# Patient Record
Sex: MALE | Race: BLACK OR AFRICAN AMERICAN | Employment: UNEMPLOYED | URBAN - METROPOLITAN AREA
[De-identification: names, ages, dates, MRNs, and addresses within clinical notes are randomized per-mention and may not be internally consistent; named-entity substitution may affect disease eponyms.]

---

## 2019-01-16 ENCOUNTER — TELEPHONE (OUTPATIENT)
Dept: RHEUMATOLOGY | Age: 29
End: 2019-01-16

## 2019-01-16 ENCOUNTER — OFFICE VISIT (OUTPATIENT)
Dept: RHEUMATOLOGY | Age: 29
End: 2019-01-16
Payer: MEDICAID

## 2019-01-16 VITALS
HEART RATE: 80 BPM | HEIGHT: 63 IN | SYSTOLIC BLOOD PRESSURE: 122 MMHG | WEIGHT: 124.6 LBS | BODY MASS INDEX: 22.08 KG/M2 | DIASTOLIC BLOOD PRESSURE: 80 MMHG

## 2019-01-16 DIAGNOSIS — M45.8 ANKYLOSING SPONDYLITIS OF SACRAL REGION (HCC): Primary | ICD-10-CM

## 2019-01-16 DIAGNOSIS — Z79.899 HIGH RISK MEDICATION USE: ICD-10-CM

## 2019-01-16 DIAGNOSIS — L93.0 DISCOID LUPUS: Chronic | ICD-10-CM

## 2019-01-16 DIAGNOSIS — K50.90 CROHN'S DISEASE WITHOUT COMPLICATION, UNSPECIFIED GASTROINTESTINAL TRACT LOCATION (HCC): Chronic | ICD-10-CM

## 2019-01-16 DIAGNOSIS — M45.8 ANKYLOSING SPONDYLITIS OF SACRAL REGION (HCC): ICD-10-CM

## 2019-01-16 LAB
A/G RATIO: 1 (ref 1.1–2.2)
ALBUMIN SERPL-MCNC: 3.6 G/DL (ref 3.4–5)
ALP BLD-CCNC: 87 U/L (ref 40–129)
ALT SERPL-CCNC: <5 U/L (ref 10–40)
ANION GAP SERPL CALCULATED.3IONS-SCNC: 11 MMOL/L (ref 3–16)
AST SERPL-CCNC: 9 U/L (ref 15–37)
BASOPHILS ABSOLUTE: 0 K/UL (ref 0–0.2)
BASOPHILS RELATIVE PERCENT: 0.6 %
BILIRUB SERPL-MCNC: <0.2 MG/DL (ref 0–1)
BILIRUBIN URINE: NEGATIVE
BLOOD, URINE: NEGATIVE
BUN BLDV-MCNC: 9 MG/DL (ref 7–20)
C-REACTIVE PROTEIN: 82.4 MG/L (ref 0–5.1)
CALCIUM SERPL-MCNC: 9 MG/DL (ref 8.3–10.6)
CHLORIDE BLD-SCNC: 103 MMOL/L (ref 99–110)
CLARITY: ABNORMAL
CO2: 27 MMOL/L (ref 21–32)
COLOR: YELLOW
CREAT SERPL-MCNC: 0.7 MG/DL (ref 0.9–1.3)
EOSINOPHILS ABSOLUTE: 0.1 K/UL (ref 0–0.6)
EOSINOPHILS RELATIVE PERCENT: 2.6 %
EPITHELIAL CELLS, UA: 0 /HPF (ref 0–5)
GFR AFRICAN AMERICAN: >60
GFR NON-AFRICAN AMERICAN: >60
GLOBULIN: 3.7 G/DL
GLUCOSE BLD-MCNC: 88 MG/DL (ref 70–99)
GLUCOSE URINE: NEGATIVE MG/DL
HCT VFR BLD CALC: 33.5 % (ref 40.5–52.5)
HEMOGLOBIN: 10.3 G/DL (ref 13.5–17.5)
HEPATITIS B SURFACE ANTIGEN INTERPRETATION: NORMAL
HEPATITIS C ANTIBODY INTERPRETATION: NORMAL
HYALINE CASTS: 0 /LPF (ref 0–8)
KETONES, URINE: NEGATIVE MG/DL
LEUKOCYTE ESTERASE, URINE: NEGATIVE
LYMPHOCYTES ABSOLUTE: 1.7 K/UL (ref 1–5.1)
LYMPHOCYTES RELATIVE PERCENT: 30.7 %
MCH RBC QN AUTO: 22.6 PG (ref 26–34)
MCHC RBC AUTO-ENTMCNC: 30.7 G/DL (ref 31–36)
MCV RBC AUTO: 73.5 FL (ref 80–100)
MICROSCOPIC EXAMINATION: YES
MONOCYTES ABSOLUTE: 0.4 K/UL (ref 0–1.3)
MONOCYTES RELATIVE PERCENT: 6.5 %
NEUTROPHILS ABSOLUTE: 3.4 K/UL (ref 1.7–7.7)
NEUTROPHILS RELATIVE PERCENT: 59.6 %
NITRITE, URINE: NEGATIVE
PDW BLD-RTO: 19 % (ref 12.4–15.4)
PH UA: 6.5
PLATELET # BLD: 527 K/UL (ref 135–450)
PMV BLD AUTO: 6.9 FL (ref 5–10.5)
POTASSIUM SERPL-SCNC: 4.3 MMOL/L (ref 3.5–5.1)
PROTEIN UA: NEGATIVE MG/DL
RBC # BLD: 4.56 M/UL (ref 4.2–5.9)
RBC UA: 4 /HPF (ref 0–4)
RHEUMATOID FACTOR: <10 IU/ML
SODIUM BLD-SCNC: 141 MMOL/L (ref 136–145)
SPECIFIC GRAVITY UA: 1.02
TOTAL PROTEIN: 7.3 G/DL (ref 6.4–8.2)
UROBILINOGEN, URINE: 0.2 E.U./DL
VITAMIN D 25-HYDROXY: 8.8 NG/ML
WBC # BLD: 5.6 K/UL (ref 4–11)
WBC UA: 1 /HPF (ref 0–5)

## 2019-01-16 PROCEDURE — G8420 CALC BMI NORM PARAMETERS: HCPCS | Performed by: INTERNAL MEDICINE

## 2019-01-16 PROCEDURE — G8484 FLU IMMUNIZE NO ADMIN: HCPCS | Performed by: INTERNAL MEDICINE

## 2019-01-16 PROCEDURE — G8427 DOCREV CUR MEDS BY ELIG CLIN: HCPCS | Performed by: INTERNAL MEDICINE

## 2019-01-16 PROCEDURE — 99244 OFF/OP CNSLTJ NEW/EST MOD 40: CPT | Performed by: INTERNAL MEDICINE

## 2019-01-16 RX ORDER — AMLODIPINE BESYLATE 10 MG/1
10 TABLET ORAL DAILY
COMMUNITY

## 2019-01-16 RX ORDER — MESALAMINE 0.38 G/1
1.5 CAPSULE, EXTENDED RELEASE ORAL DAILY
COMMUNITY
End: 2022-06-30

## 2019-01-16 RX ORDER — DIAPER,BRIEF,INFANT-TODD,DISP
EACH MISCELLANEOUS 2 TIMES DAILY
COMMUNITY

## 2019-01-16 RX ORDER — CYCLOBENZAPRINE HCL 10 MG
10 TABLET ORAL 3 TIMES DAILY PRN
COMMUNITY
End: 2019-11-20 | Stop reason: CLARIF

## 2019-01-16 RX ORDER — METOPROLOL TARTRATE 50 MG/1
50 TABLET, FILM COATED ORAL 2 TIMES DAILY
COMMUNITY
End: 2022-06-30

## 2019-01-16 RX ORDER — FERROUS SULFATE 325(65) MG
325 TABLET ORAL
COMMUNITY

## 2019-01-17 LAB
ANTI-SS-A IGG: 0.7 AI (ref 0–0.9)
ANTI-SS-B IGG: <0.2 AI (ref 0–0.9)
HEPATITIS B CORE TOTAL ANTIBODY: NEGATIVE
HIV AG/AB: NORMAL
HIV ANTIGEN: NORMAL
HIV-1 ANTIBODY: NORMAL
HIV-2 AB: NORMAL
SEDIMENTATION RATE, ERYTHROCYTE: 80 MM/HR (ref 0–15)

## 2019-01-18 ENCOUNTER — TELEPHONE (OUTPATIENT)
Dept: RHEUMATOLOGY | Age: 29
End: 2019-01-18

## 2019-01-18 LAB
CCP IGG ANTIBODIES: 4 UNITS (ref 0–19)
HLA B27: POSITIVE

## 2019-01-19 LAB
QUANTI TB GOLD PLUS: NEGATIVE
QUANTI TB1 MINUS NIL: 0.01 IU/ML (ref 0–0.34)
QUANTI TB2 MINUS NIL: 0.01 IU/ML (ref 0–0.34)
QUANTIFERON MITOGEN: 6.94 IU/ML
QUANTIFERON NIL: 0.01 IU/ML

## 2019-01-23 ENCOUNTER — TELEPHONE (OUTPATIENT)
Dept: RHEUMATOLOGY | Age: 29
End: 2019-01-23

## 2019-01-30 ENCOUNTER — TELEPHONE (OUTPATIENT)
Dept: RHEUMATOLOGY | Age: 29
End: 2019-01-30

## 2019-02-20 ENCOUNTER — HOSPITAL ENCOUNTER (OUTPATIENT)
Dept: GENERAL RADIOLOGY | Age: 29
Discharge: HOME | End: 2019-02-20
Payer: MEDICAID

## 2019-02-20 ENCOUNTER — HOSPITAL ENCOUNTER (OUTPATIENT)
Age: 29
Discharge: HOME | End: 2019-02-20
Payer: MEDICAID

## 2019-02-20 ENCOUNTER — OFFICE VISIT (OUTPATIENT)
Dept: RHEUMATOLOGY | Age: 29
End: 2019-02-20
Payer: MEDICAID

## 2019-02-20 VITALS
WEIGHT: 120 LBS | BODY MASS INDEX: 20.49 KG/M2 | SYSTOLIC BLOOD PRESSURE: 120 MMHG | HEART RATE: 66 BPM | HEIGHT: 64 IN | DIASTOLIC BLOOD PRESSURE: 84 MMHG

## 2019-02-20 DIAGNOSIS — Z23 NEED FOR INFLUENZA VACCINATION: ICD-10-CM

## 2019-02-20 DIAGNOSIS — R70.0 ELEVATED ERYTHROCYTE SEDIMENTATION RATE: ICD-10-CM

## 2019-02-20 DIAGNOSIS — K50.90 CROHN'S DISEASE WITHOUT COMPLICATION, UNSPECIFIED GASTROINTESTINAL TRACT LOCATION (HCC): Primary | Chronic | ICD-10-CM

## 2019-02-20 DIAGNOSIS — M45.8 ANKYLOSING SPONDYLITIS OF SACRAL REGION (HCC): ICD-10-CM

## 2019-02-20 DIAGNOSIS — R79.82 ELEVATED C-REACTIVE PROTEIN (CRP): ICD-10-CM

## 2019-02-20 DIAGNOSIS — K50.90 CROHN'S DISEASE WITHOUT COMPLICATION, UNSPECIFIED GASTROINTESTINAL TRACT LOCATION (HCC): Chronic | ICD-10-CM

## 2019-02-20 DIAGNOSIS — Z79.899 HIGH RISK MEDICATION USE: ICD-10-CM

## 2019-02-20 DIAGNOSIS — E55.9 VITAMIN D DEFICIENCY: Chronic | ICD-10-CM

## 2019-02-20 DIAGNOSIS — M45.0 ANKYLOSING SPONDYLITIS OF MULTIPLE SITES IN SPINE (HCC): Chronic | ICD-10-CM

## 2019-02-20 DIAGNOSIS — L93.0 DISCOID LUPUS: Chronic | ICD-10-CM

## 2019-02-20 LAB
C3 COMPLEMENT: 150.2 MG/DL (ref 90–180)
C4 COMPLEMENT: 33.4 MG/DL (ref 10–40)

## 2019-02-20 PROCEDURE — 90471 IMMUNIZATION ADMIN: CPT | Performed by: INTERNAL MEDICINE

## 2019-02-20 PROCEDURE — 99215 OFFICE O/P EST HI 40 MIN: CPT | Performed by: INTERNAL MEDICINE

## 2019-02-20 PROCEDURE — 4004F PT TOBACCO SCREEN RCVD TLK: CPT | Performed by: INTERNAL MEDICINE

## 2019-02-20 PROCEDURE — 73130 X-RAY EXAM OF HAND: CPT

## 2019-02-20 PROCEDURE — G8420 CALC BMI NORM PARAMETERS: HCPCS | Performed by: INTERNAL MEDICINE

## 2019-02-20 PROCEDURE — G8427 DOCREV CUR MEDS BY ELIG CLIN: HCPCS | Performed by: INTERNAL MEDICINE

## 2019-02-20 PROCEDURE — 72050 X-RAY EXAM NECK SPINE 4/5VWS: CPT

## 2019-02-20 PROCEDURE — 90686 IIV4 VACC NO PRSV 0.5 ML IM: CPT | Performed by: INTERNAL MEDICINE

## 2019-02-20 PROCEDURE — G8482 FLU IMMUNIZE ORDER/ADMIN: HCPCS | Performed by: INTERNAL MEDICINE

## 2019-02-21 LAB — ANTI-NUCLEAR ANTIBODY (ANA): NEGATIVE

## 2019-02-26 DIAGNOSIS — M53.2X2 SPINAL INSTABILITY OF CERVICAL REGION: Primary | ICD-10-CM

## 2019-03-06 ENCOUNTER — HOSPITAL ENCOUNTER (OUTPATIENT)
Dept: MRI IMAGING | Age: 29
Discharge: HOME | End: 2019-03-06
Payer: MEDICAID

## 2019-03-06 DIAGNOSIS — M53.2X2 SPINAL INSTABILITY OF CERVICAL REGION: ICD-10-CM

## 2019-03-06 PROCEDURE — 72141 MRI NECK SPINE W/O DYE: CPT

## 2019-03-08 ENCOUNTER — TELEPHONE (OUTPATIENT)
Dept: RHEUMATOLOGY | Age: 29
End: 2019-03-08

## 2019-03-11 ENCOUNTER — TELEPHONE (OUTPATIENT)
Dept: INTERNAL MEDICINE CLINIC | Age: 29
End: 2019-03-11

## 2019-03-12 ENCOUNTER — TELEPHONE (OUTPATIENT)
Dept: INTERNAL MEDICINE CLINIC | Age: 29
End: 2019-03-12

## 2019-03-19 ENCOUNTER — TELEPHONE (OUTPATIENT)
Dept: INTERNAL MEDICINE CLINIC | Age: 29
End: 2019-03-19

## 2019-05-07 NOTE — PROGRESS NOTES
@[facility]@     Nisreen Mock MD  [facility] ([facility]) Physicians  [facility] [place] and Rheumatology    Rheumatology Progress Note  SUBJECTIVE:    Background:   Azalia Garland is a 26 y.o. AAM w/ recently diagnosed AS (+HLA B27, chronic inflammatory back pain, MRI evidence of b/l sacroiliitis w/ peripheral involvement, Hx of plantar fasciitis) in setting of Bx proven CD (follows w/ GI, Dr. Felix Tate) and Bx proven discoid lupus (follows w/ Dermatologist, Dr. Benji Casiano). PMHx pertinent for severe vitamin D deficiency, HTN, PTSD, and anxiety. Current rheum meds:  Humira 40 mg SC Q2wk: cleared by Derm and GI, received first dose on 3/15/19  Apriso (GI)  Tramadol (per PM)  weekly Cholecalciferol 50,000 units w/ daily calcium citrate 1000 mg  Fluocinonide topical (per Dermatology)     Past medical/surgical history, medications and allergies are reviewed and updated as appropriate. Interval Hx:   Reviewed Dermatology note from 2/27/19 who agreed w/ initiation of TNF-I. Pt received his first dose of Humira on 3/15/19, he's been injecting himself. He feels up to 40% improvement in his chronic LBP. Prior morning stiffness has completely resolved. He states that he is able to participate in more daily ADLs. He is going to PT, feels he is getting back some ROM in his spine. He is very curious about his MRI findings. Pt states he has been evaluated by Neurosurgery (Dr. Workman)'s ? NP and was told that he has a stable C-spine, no surgery needed. Pt established care w/ [facility], reviewed progress note from 2/21/19, pt was started on Tramadol. He wants to switch to another pain doctor because it's too far away - he states he has to drive an hr each way. He denies any active IBD Sx, he states he remains on Apriso and he has an upcoming appt w/ Dr. Gera Lutz later this month. No new discoid lesions, has upcoming appt w/ dermatologist next wk.   Denies recent oral or nasal ulcers. Denies enthesitis, Sx of ocular inflammation. Rheumatologic ROS:  Constitutional: +fatigue improved, denies fever/chills, night sweats, unintentional weight loss  Integumentary: +photosensitivity w/ chronic discoid lupus rash and chronic diffuse alopecia, denies Sx of Raynaud's phenomenon  Eyes: blurry vision in L eye after watching TV for a long time, denies dry eyes, redness or pain  Ears: denies hearing loss, tinnitus, vertigo, or recurrent ear infections  Nose: denies nasal ulcers or recurrent sinusitis  Oral cavity: +occasional tongue ulcers, denies dry mouth or thrush  Cardiovascular: denies CP, palpitations, Hx of pericardial effusion or pericarditis  Respiratory: denies SOB, cough, hemoptysis, or pleurisy  Gastrointestinal: denies heart burn, dysphagia or esophageal dysmotility, denies change in bowel habits or Sx of IBD  Genitourinary: denies change in urine amount or urine appearance, denies frothy urine or Hx of nephrolithiasis  Hematologic/Lymphatic: denies abnormal bruising or bleeding, denies Hx of blood clots or recurrent miscarriages, denies swollen lymph nodes  Musculoskeletal:  refer to above HPI   Neurological: denies focal weakness, paresthesias/hyperesthesias or change in sensation, denies Hx of seizure, denies change in gait, balance, or memory  Psychiatric: +PTSD, anxiety on Buspirone and Escitalopram  Endocrine: denies Hx of thyroid or parathyroid disease, denies known Hx of osteoporosis or fragility fractures  Allergic/Immunologic: denies nasal congestion, chronic asthma, or hives    No Known Allergies    Past Medical History:    No past medical history on file. Past Surgical History:    No past surgical history on file.     Medications:    Current Outpatient Medications   Medication Sig Dispense Refill    adalimumab (HUMIRA) 40 MG/0.8ML injection Inject 40 mg into the skin once      CALCIUM CITRATE, BARIATRIC ADVANTAGE, 500MG LOZENGE Take 1 lozenge by mouth 2 times strong full fists              Wrist: no synovitis in the wrist joints b/l, FROM              Elbow: no synovitis or bursitis, FROM              Shoulders: no pain or swelling or warmth on palpation, FROM  Lower extremities:              Knees: no warmth or effusion present, FROM              Ankles: no synovitis, FROM              Feet: no toe swelling or pain or warmth on palpation w/ FROM, negative MTP squeeze test  INTEGUMENTARY: hypopigmented scars and macules on face, inner ear, and arms, prior dry and scaly skin resolved, there is diffuse hair thinning, multiple tattoos, no petechiae, bruises, or palpable purpura, no clubbing or digital ulcers  PSYCH: normal mood    DATA:  Labs:    I personally reviewed interval labs and discussed w/ the pt in detail which showed:  Lab Results   Component Value Date    WBC 5.6 01/16/2019    HGB 10.3 (L) 01/16/2019    HCT 33.5 (L) 01/16/2019    MCV 73.5 (L) 01/16/2019     (H) 01/16/2019    LYMPHOPCT 30.7 01/16/2019    RBC 4.56 01/16/2019    MCH 22.6 (L) 01/16/2019    MCHC 30.7 (L) 01/16/2019    RDW 19.0 (H) 01/16/2019     Lab Results   Component Value Date     01/16/2019    K 4.3 01/16/2019     01/16/2019    CO2 27 01/16/2019    BUN 9 01/16/2019    CREATININE 0.7 (L) 01/16/2019    GLUCOSE 88 01/16/2019    CALCIUM 9.0 01/16/2019    PROT 7.3 01/16/2019    LABALBU 3.6 01/16/2019    BILITOT <0.2 01/16/2019    ALKPHOS 87 01/16/2019    AST 9 (L) 01/16/2019    ALT <5 (L) 01/16/2019    LABGLOM >60 01/16/2019    GFRAA >60 01/16/2019    AGRATIO 1.0 (L) 01/16/2019    GLOB 3.7 01/16/2019     CRP elevated to 9 mg/dL, ESR 63 (10/9/18)  NENITA negative (10/9/18)    Interval labs from 1/16/19:  UA w/ micro: no protein or active urinary sediments  CRP significantly elevated to 82.4 mg/L, ESR 80  Positive HLA-B27  Negative SSA, SSB  Negative hepatitis B and C serologies, HIV screen, QuantiFERON TB  Vitamin D, 25-hydroxy 8.8    Interval labs from 2/20/19:  Negative NENITA, C3 and C4 Marc Walter) from 8/17/18:  \"Pt w/ CD, R sided colitis at last c-scope in 2011. He has been noncompliant with meds and has not followed up with us until recently. .. Restarted Apriso 1 month back\"    C-scope from 8/17/18:  \"There was erosion, friability, granularity, linear ulcer, loss of vascular marking noted in the colon. There was stigmata bleeding noted. Cold forceps biopsies were obtained for the purpose of histology\". Random colon Bx showed \"focal active colitis, negative for dysplasia\".     Dermatopathology report on LUE skin Bx (9/28/18) showed \"interface dermatitis with increase in interstitial mucin\"    Above results were discussed w/ the pt in detail during today's visit. ASSESSMENT/PLAN:   I personally reviewed Dermatology note from 2/27/19 and Pain Management note from 2/27/19 and discussed w/ the pt and his mother. Significant improvement in AS since starting Humira, he has already regained some of his axial ROM back. Repeat inflammatory markers today. Cont current dose at 40 mg SQ Q14 days, he's been tolerating this w/o s/e. Again we discussed the s/e and risks of TNF-I including DIL in setting of his discoid lupus. He remains asymptomatic, will plan on repeating lupus markers at f/u visit. Pt states he still needs to contact his PCP regarding Prevnar 13 and Pneumonax. I encouraged the pt to cont going to PT. Will reach out to Neurosurgeon's office for recent notes. Per pt request, made referral to External Pain Management as his current pain physician is too far away. Avoid NSAIDs d/t Hx of IBD. Previously called in weekly Cholecalciferol 50,000 units w/ daily calcium citrate 1000 mg but pt states he only took 4 doses of Cholecalciferol and has not started taking Calcium. I instructed him to call his pharmacy for more refills. Ruby Salazar was seen today for follow-up.     Diagnoses and all orders for this visit:    Ankylosing spondylitis of multiple sites in spine (HCC)  -     C-Reactive Protein; Future  -     Sedimentation Rate; Future  -     External Referral To Pain Clinic    Crohn's disease without complication, unspecified gastrointestinal tract location (HCC)  -     C-Reactive Protein; Future  -     Sedimentation Rate; Future  -     CALCIUM CITRATE, BARIATRIC ADVANTAGE, 500MG LOZENGE; Take 1 lozenge by mouth 2 times daily    Vitamin D deficiency  -     CALCIUM CITRATE, BARIATRIC ADVANTAGE, 500MG LOZENGE; Take 1 lozenge by mouth 2 times daily    Discoid lupus    Elevated C-reactive protein (CRP)    Elevated erythrocyte sedimentation rate    High risk medication use        Orders Placed This Encounter   Procedures    C-Reactive Protein     Standing Status:   Future     Number of Occurrences:   1     Standing Expiration Date:   11/8/2019    Sedimentation Rate     Standing Status:   Future     Number of Occurrences:   1     Standing Expiration Date:   11/8/2019    External Referral To Pain Clinic     Referral Priority:   Urgent     Referral Type:   Eval and Treat     Referral Reason:   Specialty Services Required     Requested Specialty:   Pain Management     Number of Visits Requested:   1     Outpatient Encounter Medications as of 5/8/2019   Medication Sig Dispense Refill    adalimumab (HUMIRA) 40 MG/0.8ML injection Inject 40 mg into the skin once      CALCIUM CITRATE, BARIATRIC ADVANTAGE, 500MG LOZENGE Take 1 lozenge by mouth 2 times daily 270 lozenge 0    Cholecalciferol 93010 units TABS Take 50,000 Units by mouth once a week For 12 weeks then take over-the-counter vitamin D3 2000 units daily. 12 tablet 0    fluocinolone 0.01 % cream Apply topically 2 times daily      hydrocortisone 0.5 % cream Apply topically 2 times daily Apply topically 2 times daily.       amLODIPine (NORVASC) 10 MG tablet Take 10 mg by mouth daily      metoprolol tartrate (LOPRESSOR) 50 MG tablet Take 50 mg by mouth 2 times daily      ferrous sulfate 325 (65 Fe) MG tablet Take 325 mg by mouth daily (with breakfast)      mesalamine (APRISO) 0.375 g extended release capsule Take 1.5 g by mouth daily      cyclobenzaprine (FLEXERIL) 10 MG tablet Take 10 mg by mouth 3 times daily as needed for Muscle spasms      [DISCONTINUED] CALCIUM CITRATE, BARIATRIC ADVANTAGE, 500MG LOZENGE Take 1 lozenge by mouth 2 times daily 270 lozenge 0     No facility-administered encounter medications on file as of 5/8/2019. Return in about 3 months (around 8/8/2019) for lab result discussion and treatment plan, medication monitoring. The risks and benefits of my recommendations, as well as other treatment options, benefits and side effects were discussed with the patient today. Questions were answered. Tayo Riley MD  [facility]) Physicians  [facility] [place] and Rheumatology  [address] [place] [address] [address]  [facility]:[phone]  [facility]:[phone]    NOTE: This report is transcribed by using voice recognition software dragon. Every effort is made to ensure accuracy; however, inadvertent computerized  transcription errors may be present.      Yaya Gonzalez MD, 5/8/2019 4:30 PM

## 2019-05-08 ENCOUNTER — OFFICE VISIT (OUTPATIENT)
Dept: RHEUMATOLOGY | Age: 29
End: 2019-05-08
Payer: MEDICAID

## 2019-05-08 VITALS
SYSTOLIC BLOOD PRESSURE: 129 MMHG | DIASTOLIC BLOOD PRESSURE: 88 MMHG | WEIGHT: 123 LBS | BODY MASS INDEX: 21.11 KG/M2 | HEART RATE: 87 BPM

## 2019-05-08 DIAGNOSIS — M45.0 ANKYLOSING SPONDYLITIS OF MULTIPLE SITES IN SPINE (HCC): Primary | ICD-10-CM

## 2019-05-08 DIAGNOSIS — M45.0 ANKYLOSING SPONDYLITIS OF MULTIPLE SITES IN SPINE (HCC): ICD-10-CM

## 2019-05-08 DIAGNOSIS — R79.82 ELEVATED C-REACTIVE PROTEIN (CRP): ICD-10-CM

## 2019-05-08 DIAGNOSIS — K50.90 CROHN'S DISEASE WITHOUT COMPLICATION, UNSPECIFIED GASTROINTESTINAL TRACT LOCATION (HCC): ICD-10-CM

## 2019-05-08 DIAGNOSIS — Z79.899 HIGH RISK MEDICATION USE: ICD-10-CM

## 2019-05-08 DIAGNOSIS — R70.0 ELEVATED ERYTHROCYTE SEDIMENTATION RATE: ICD-10-CM

## 2019-05-08 DIAGNOSIS — L93.0 DISCOID LUPUS: ICD-10-CM

## 2019-05-08 DIAGNOSIS — E55.9 VITAMIN D DEFICIENCY: ICD-10-CM

## 2019-05-08 PROCEDURE — 4004F PT TOBACCO SCREEN RCVD TLK: CPT | Performed by: INTERNAL MEDICINE

## 2019-05-08 PROCEDURE — G8427 DOCREV CUR MEDS BY ELIG CLIN: HCPCS | Performed by: INTERNAL MEDICINE

## 2019-05-08 PROCEDURE — 99215 OFFICE O/P EST HI 40 MIN: CPT | Performed by: INTERNAL MEDICINE

## 2019-05-08 PROCEDURE — G8420 CALC BMI NORM PARAMETERS: HCPCS | Performed by: INTERNAL MEDICINE

## 2019-05-09 ENCOUNTER — TELEPHONE (OUTPATIENT)
Dept: INTERNAL MEDICINE CLINIC | Age: 29
End: 2019-05-09

## 2019-05-09 DIAGNOSIS — K50.90 CROHN'S DISEASE WITHOUT COMPLICATION, UNSPECIFIED GASTROINTESTINAL TRACT LOCATION (HCC): Chronic | ICD-10-CM

## 2019-05-09 DIAGNOSIS — M45.0 ANKYLOSING SPONDYLITIS OF MULTIPLE SITES IN SPINE (HCC): Primary | Chronic | ICD-10-CM

## 2019-05-09 LAB
C-REACTIVE PROTEIN: 17.2 MG/L (ref 0–5.1)
SEDIMENTATION RATE, ERYTHROCYTE: 20 MM/HR (ref 0–15)

## 2019-05-09 NOTE — TELEPHONE ENCOUNTER
Patient calling with info requested by Dr. Constanza Correia. Pharmacy fax [phone]  [address] [place] [address].

## 2019-05-13 ENCOUNTER — TELEPHONE (OUTPATIENT)
Dept: RHEUMATOLOGY | Age: 29
End: 2019-05-13

## 2019-05-13 NOTE — TELEPHONE ENCOUNTER
Received fax stating needing clarification on Humira, spoke with Hu named Sanam Hansen stating that it is Humira CF 40mg/0.4ML Pen. Pharmacy verbalized understanding.

## 2019-05-17 ENCOUNTER — TELEPHONE (OUTPATIENT)
Dept: RHEUMATOLOGY | Age: 29
End: 2019-05-17

## 2019-05-17 NOTE — TELEPHONE ENCOUNTER
Received fax requesting last office note, labs and [facility] Faxed with successful.

## 2019-06-28 ENCOUNTER — TELEPHONE (OUTPATIENT)
Dept: INTERNAL MEDICINE CLINIC | Age: 29
End: 2019-06-28

## 2019-07-25 ENCOUNTER — TELEPHONE (OUTPATIENT)
Dept: INTERNAL MEDICINE CLINIC | Age: 29
End: 2019-07-25

## 2019-07-26 ENCOUNTER — TELEPHONE (OUTPATIENT)
Dept: INTERNAL MEDICINE CLINIC | Age: 29
End: 2019-07-26

## 2019-07-26 DIAGNOSIS — M45.0 ANKYLOSING SPONDYLITIS OF MULTIPLE SITES IN SPINE (HCC): Primary | ICD-10-CM

## 2019-07-30 ENCOUNTER — TELEPHONE (OUTPATIENT)
Dept: INTERNAL MEDICINE CLINIC | Age: 29
End: 2019-07-30

## 2019-07-31 ENCOUNTER — TELEPHONE (OUTPATIENT)
Dept: INTERNAL MEDICINE CLINIC | Age: 29
End: 2019-07-31

## 2019-07-31 NOTE — TELEPHONE ENCOUNTER
Pt calling back to give you a fax# to [facility] to send order for mri  [phone]

## 2019-08-05 DIAGNOSIS — M45.0 ANKYLOSING SPONDYLITIS OF MULTIPLE SITES IN SPINE (HCC): Primary | ICD-10-CM

## 2019-08-05 RX ORDER — ADALIMUMAB 40MG/0.4ML
KIT SUBCUTANEOUS
Qty: 2 EACH | Refills: 2 | Status: SHIPPED | OUTPATIENT
Start: 2019-08-05 | End: 2019-10-30 | Stop reason: SDUPTHER

## 2019-08-21 ENCOUNTER — OFFICE VISIT (OUTPATIENT)
Dept: RHEUMATOLOGY | Age: 29
End: 2019-08-21
Payer: MEDICAID

## 2019-08-21 VITALS
DIASTOLIC BLOOD PRESSURE: 99 MMHG | BODY MASS INDEX: 22.31 KG/M2 | HEART RATE: 68 BPM | WEIGHT: 130 LBS | SYSTOLIC BLOOD PRESSURE: 142 MMHG

## 2019-08-21 DIAGNOSIS — L93.0 DISCOID LUPUS: ICD-10-CM

## 2019-08-21 DIAGNOSIS — E55.9 VITAMIN D DEFICIENCY: ICD-10-CM

## 2019-08-21 DIAGNOSIS — K50.90 CROHN'S DISEASE WITHOUT COMPLICATION, UNSPECIFIED GASTROINTESTINAL TRACT LOCATION (HCC): ICD-10-CM

## 2019-08-21 DIAGNOSIS — R79.82 ELEVATED C-REACTIVE PROTEIN (CRP): ICD-10-CM

## 2019-08-21 DIAGNOSIS — Z79.899 HIGH RISK MEDICATION USE: ICD-10-CM

## 2019-08-21 DIAGNOSIS — M45.0 ANKYLOSING SPONDYLITIS OF MULTIPLE SITES IN SPINE (HCC): Primary | ICD-10-CM

## 2019-08-21 DIAGNOSIS — M45.0 ANKYLOSING SPONDYLITIS OF MULTIPLE SITES IN SPINE (HCC): ICD-10-CM

## 2019-08-21 LAB
BASOPHILS ABSOLUTE: 0 K/UL (ref 0–0.2)
BASOPHILS RELATIVE PERCENT: 0.7 %
EOSINOPHILS ABSOLUTE: 0.1 K/UL (ref 0–0.6)
EOSINOPHILS RELATIVE PERCENT: 1.5 %
HCT VFR BLD CALC: 41.3 % (ref 40.5–52.5)
HEMOGLOBIN: 13.7 G/DL (ref 13.5–17.5)
LYMPHOCYTES ABSOLUTE: 3 K/UL (ref 1–5.1)
LYMPHOCYTES RELATIVE PERCENT: 46.3 %
MCH RBC QN AUTO: 28.8 PG (ref 26–34)
MCHC RBC AUTO-ENTMCNC: 33.1 G/DL (ref 31–36)
MCV RBC AUTO: 86.9 FL (ref 80–100)
MONOCYTES ABSOLUTE: 0.3 K/UL (ref 0–1.3)
MONOCYTES RELATIVE PERCENT: 5.2 %
NEUTROPHILS ABSOLUTE: 3 K/UL (ref 1.7–7.7)
NEUTROPHILS RELATIVE PERCENT: 46.3 %
PDW BLD-RTO: 17.1 % (ref 12.4–15.4)
PLATELET # BLD: 303 K/UL (ref 135–450)
PMV BLD AUTO: 8.7 FL (ref 5–10.5)
RBC # BLD: 4.75 M/UL (ref 4.2–5.9)
WBC # BLD: 6.4 K/UL (ref 4–11)

## 2019-08-21 PROCEDURE — G8427 DOCREV CUR MEDS BY ELIG CLIN: HCPCS | Performed by: INTERNAL MEDICINE

## 2019-08-21 PROCEDURE — 99214 OFFICE O/P EST MOD 30 MIN: CPT | Performed by: INTERNAL MEDICINE

## 2019-08-21 PROCEDURE — 4004F PT TOBACCO SCREEN RCVD TLK: CPT | Performed by: INTERNAL MEDICINE

## 2019-08-21 PROCEDURE — G8420 CALC BMI NORM PARAMETERS: HCPCS | Performed by: INTERNAL MEDICINE

## 2019-08-22 DIAGNOSIS — L93.0 DISCOID LUPUS: ICD-10-CM

## 2019-08-22 LAB
A/G RATIO: 1.4 (ref 1.1–2.2)
ALBUMIN SERPL-MCNC: 4.3 G/DL (ref 3.4–5)
ALP BLD-CCNC: 87 U/L (ref 40–129)
ALT SERPL-CCNC: <5 U/L (ref 10–40)
ANION GAP SERPL CALCULATED.3IONS-SCNC: 16 MMOL/L (ref 3–16)
ANTI-DSDNA IGG: 1 IU/ML (ref 0–9)
AST SERPL-CCNC: 15 U/L (ref 15–37)
BILIRUB SERPL-MCNC: <0.2 MG/DL (ref 0–1)
BUN BLDV-MCNC: 8 MG/DL (ref 7–20)
C-REACTIVE PROTEIN: 15.8 MG/L (ref 0–5.1)
C3 COMPLEMENT: 138.4 MG/DL (ref 90–180)
C4 COMPLEMENT: 26.9 MG/DL (ref 10–40)
CALCIUM SERPL-MCNC: 9.1 MG/DL (ref 8.3–10.6)
CHLORIDE BLD-SCNC: 100 MMOL/L (ref 99–110)
CO2: 24 MMOL/L (ref 21–32)
CREAT SERPL-MCNC: 1 MG/DL (ref 0.9–1.3)
GFR AFRICAN AMERICAN: >60
GFR NON-AFRICAN AMERICAN: >60
GLOBULIN: 3 G/DL
GLUCOSE BLD-MCNC: 87 MG/DL (ref 70–99)
POTASSIUM SERPL-SCNC: 4.4 MMOL/L (ref 3.5–5.1)
SEDIMENTATION RATE, ERYTHROCYTE: 18 MM/HR (ref 0–15)
SODIUM BLD-SCNC: 140 MMOL/L (ref 136–145)
TOTAL PROTEIN: 7.3 G/DL (ref 6.4–8.2)
VITAMIN D 25-HYDROXY: 41.9 NG/ML

## 2019-09-27 ENCOUNTER — TELEPHONE (OUTPATIENT)
Dept: INTERNAL MEDICINE CLINIC | Age: 29
End: 2019-09-27

## 2019-10-09 ENCOUNTER — TELEPHONE (OUTPATIENT)
Dept: INTERNAL MEDICINE CLINIC | Age: 29
End: 2019-10-09

## 2019-10-30 DIAGNOSIS — M45.0 ANKYLOSING SPONDYLITIS OF MULTIPLE SITES IN SPINE (HCC): Primary | ICD-10-CM

## 2019-10-30 RX ORDER — ADALIMUMAB 40MG/0.4ML
KIT SUBCUTANEOUS
Qty: 2 EACH | Refills: 2 | Status: SHIPPED | OUTPATIENT
Start: 2019-10-30 | End: 2020-04-08

## 2019-11-20 ENCOUNTER — OFFICE VISIT (OUTPATIENT)
Dept: RHEUMATOLOGY | Age: 29
End: 2019-11-20
Payer: MEDICAID

## 2019-11-20 VITALS
DIASTOLIC BLOOD PRESSURE: 76 MMHG | WEIGHT: 133 LBS | BODY MASS INDEX: 22.83 KG/M2 | SYSTOLIC BLOOD PRESSURE: 114 MMHG | HEART RATE: 68 BPM

## 2019-11-20 DIAGNOSIS — M45.0 ANKYLOSING SPONDYLITIS OF MULTIPLE SITES IN SPINE (HCC): Primary | ICD-10-CM

## 2019-11-20 DIAGNOSIS — Z79.899 HIGH RISK MEDICATION USE: ICD-10-CM

## 2019-11-20 DIAGNOSIS — L93.0 DISCOID LUPUS: ICD-10-CM

## 2019-11-20 DIAGNOSIS — K50.90 CROHN'S DISEASE WITHOUT COMPLICATION, UNSPECIFIED GASTROINTESTINAL TRACT LOCATION (HCC): ICD-10-CM

## 2019-11-20 PROCEDURE — G8420 CALC BMI NORM PARAMETERS: HCPCS | Performed by: INTERNAL MEDICINE

## 2019-11-20 PROCEDURE — G8484 FLU IMMUNIZE NO ADMIN: HCPCS | Performed by: INTERNAL MEDICINE

## 2019-11-20 PROCEDURE — 4004F PT TOBACCO SCREEN RCVD TLK: CPT | Performed by: INTERNAL MEDICINE

## 2019-11-20 PROCEDURE — G8427 DOCREV CUR MEDS BY ELIG CLIN: HCPCS | Performed by: INTERNAL MEDICINE

## 2019-11-20 PROCEDURE — 99215 OFFICE O/P EST HI 40 MIN: CPT | Performed by: INTERNAL MEDICINE

## 2019-11-20 RX ORDER — TRAMADOL HYDROCHLORIDE 50 MG/1
TABLET ORAL
COMMUNITY
End: 2020-10-01 | Stop reason: ALTCHOICE

## 2019-11-26 ENCOUNTER — TELEPHONE (OUTPATIENT)
Dept: RHEUMATOLOGY | Age: 29
End: 2019-11-26

## 2019-12-03 ENCOUNTER — TELEPHONE (OUTPATIENT)
Dept: RHEUMATOLOGY | Age: 29
End: 2019-12-03

## 2019-12-03 DIAGNOSIS — D72.820 LYMPHOCYTOSIS: Primary | ICD-10-CM

## 2019-12-04 ENCOUNTER — TELEPHONE (OUTPATIENT)
Dept: RHEUMATOLOGY | Age: 29
End: 2019-12-04

## 2019-12-09 ENCOUNTER — TELEPHONE (OUTPATIENT)
Dept: INTERNAL MEDICINE CLINIC | Age: 29
End: 2019-12-09

## 2019-12-09 NOTE — TELEPHONE ENCOUNTER
Went over all the messages about Humira. Explained that Humira has been approved. He wants to know the status regarding Remicaid     Is aware that Dr. Reymundo Grijalva and Rao Jalloh are out of the office. Asking if Priyanka can call tomorrow.

## 2019-12-11 ENCOUNTER — TELEPHONE (OUTPATIENT)
Dept: RHEUMATOLOGY | Age: 29
End: 2019-12-11

## 2019-12-18 ENCOUNTER — TELEPHONE (OUTPATIENT)
Dept: RHEUMATOLOGY | Age: 29
End: 2019-12-18

## 2019-12-23 ENCOUNTER — TELEPHONE (OUTPATIENT)
Dept: RHEUMATOLOGY | Age: 29
End: 2019-12-23

## 2019-12-30 ENCOUNTER — NURSE ONLY (OUTPATIENT)
Dept: RHEUMATOLOGY | Age: 29
End: 2019-12-30
Payer: COMMERCIAL

## 2019-12-30 VITALS
RESPIRATION RATE: 16 BRPM | SYSTOLIC BLOOD PRESSURE: 120 MMHG | BODY MASS INDEX: 22.8 KG/M2 | DIASTOLIC BLOOD PRESSURE: 68 MMHG | TEMPERATURE: 97.7 F | HEART RATE: 70 BPM | WEIGHT: 132.8 LBS

## 2019-12-30 DIAGNOSIS — Z79.899 ENCOUNTER FOR LONG-TERM (CURRENT) USE OF HIGH-RISK MEDICATION: ICD-10-CM

## 2019-12-30 DIAGNOSIS — M45.0 ANKYLOSING SPONDYLITIS OF MULTIPLE SITES IN SPINE (HCC): ICD-10-CM

## 2019-12-30 DIAGNOSIS — Z79.899 ENCOUNTER FOR LONG-TERM (CURRENT) USE OF HIGH-RISK MEDICATION: Primary | ICD-10-CM

## 2019-12-30 LAB
ALBUMIN SERPL-MCNC: 3.9 G/DL (ref 3.4–5)
ALP BLD-CCNC: 79 U/L (ref 40–129)
ALT SERPL-CCNC: 6 U/L (ref 10–40)
AST SERPL-CCNC: 14 U/L (ref 15–37)
BASOPHILS ABSOLUTE: 0.1 K/UL (ref 0–0.2)
BASOPHILS RELATIVE PERCENT: 1 %
BILIRUB SERPL-MCNC: <0.2 MG/DL (ref 0–1)
BILIRUBIN DIRECT: <0.2 MG/DL (ref 0–0.3)
BILIRUBIN, INDIRECT: ABNORMAL MG/DL (ref 0–1)
CREAT SERPL-MCNC: 1 MG/DL (ref 0.9–1.3)
EOSINOPHILS ABSOLUTE: 0 K/UL (ref 0–0.6)
EOSINOPHILS RELATIVE PERCENT: 0.8 %
GFR AFRICAN AMERICAN: >60
GFR NON-AFRICAN AMERICAN: >60
HCT VFR BLD CALC: 39.5 % (ref 40.5–52.5)
HEMOGLOBIN: 13.3 G/DL (ref 13.5–17.5)
LYMPHOCYTES ABSOLUTE: 2.7 K/UL (ref 1–5.1)
LYMPHOCYTES RELATIVE PERCENT: 47.4 %
MCH RBC QN AUTO: 29.7 PG (ref 26–34)
MCHC RBC AUTO-ENTMCNC: 33.7 G/DL (ref 31–36)
MCV RBC AUTO: 88.3 FL (ref 80–100)
MONOCYTES ABSOLUTE: 0.3 K/UL (ref 0–1.3)
MONOCYTES RELATIVE PERCENT: 5.7 %
NEUTROPHILS ABSOLUTE: 2.6 K/UL (ref 1.7–7.7)
NEUTROPHILS RELATIVE PERCENT: 45.1 %
PDW BLD-RTO: 13.8 % (ref 12.4–15.4)
PLATELET # BLD: 212 K/UL (ref 135–450)
PMV BLD AUTO: 8.1 FL (ref 5–10.5)
RBC # BLD: 4.47 M/UL (ref 4.2–5.9)
TOTAL PROTEIN: 6.4 G/DL (ref 6.4–8.2)
WBC # BLD: 5.8 K/UL (ref 4–11)

## 2019-12-30 PROCEDURE — 96415 CHEMO IV INFUSION ADDL HR: CPT | Performed by: INTERNAL MEDICINE

## 2019-12-30 PROCEDURE — 96413 CHEMO IV INFUSION 1 HR: CPT | Performed by: INTERNAL MEDICINE

## 2019-12-30 RX ORDER — INFLIXIMAB 100 MG/10ML
300 INJECTION, POWDER, LYOPHILIZED, FOR SOLUTION INTRAVENOUS ONCE
Status: COMPLETED | OUTPATIENT
Start: 2019-12-30 | End: 2019-12-30

## 2019-12-30 RX ADMIN — INFLIXIMAB 300 MG: 100 INJECTION, POWDER, LYOPHILIZED, FOR SOLUTION INTRAVENOUS at 12:05

## 2020-01-15 ENCOUNTER — NURSE ONLY (OUTPATIENT)
Dept: RHEUMATOLOGY | Age: 30
End: 2020-01-15
Payer: COMMERCIAL

## 2020-01-15 VITALS
RESPIRATION RATE: 16 BRPM | TEMPERATURE: 98.7 F | WEIGHT: 137 LBS | BODY MASS INDEX: 23.52 KG/M2 | DIASTOLIC BLOOD PRESSURE: 74 MMHG | HEART RATE: 70 BPM | SYSTOLIC BLOOD PRESSURE: 126 MMHG

## 2020-01-15 PROCEDURE — 96413 CHEMO IV INFUSION 1 HR: CPT | Performed by: INTERNAL MEDICINE

## 2020-01-15 PROCEDURE — 96415 CHEMO IV INFUSION ADDL HR: CPT | Performed by: INTERNAL MEDICINE

## 2020-01-15 RX ORDER — INFLIXIMAB 100 MG/10ML
400 INJECTION, POWDER, LYOPHILIZED, FOR SOLUTION INTRAVENOUS ONCE
Status: COMPLETED | OUTPATIENT
Start: 2020-01-15 | End: 2020-01-15

## 2020-01-15 RX ADMIN — INFLIXIMAB 400 MG: 100 INJECTION, POWDER, LYOPHILIZED, FOR SOLUTION INTRAVENOUS at 13:45

## 2020-02-10 ENCOUNTER — TELEPHONE (OUTPATIENT)
Dept: RHEUMATOLOGY | Age: 30
End: 2020-02-10

## 2020-02-11 ENCOUNTER — TELEPHONE (OUTPATIENT)
Dept: RHEUMATOLOGY | Age: 30
End: 2020-02-11

## 2020-02-26 ENCOUNTER — NURSE ONLY (OUTPATIENT)
Dept: RHEUMATOLOGY | Age: 30
End: 2020-02-26
Payer: COMMERCIAL

## 2020-02-26 VITALS
WEIGHT: 132.6 LBS | BODY MASS INDEX: 22.76 KG/M2 | TEMPERATURE: 97.7 F | SYSTOLIC BLOOD PRESSURE: 122 MMHG | RESPIRATION RATE: 16 BRPM | DIASTOLIC BLOOD PRESSURE: 60 MMHG | HEART RATE: 74 BPM

## 2020-02-26 LAB
A/G RATIO: 1.3 (ref 1.1–2.2)
ALBUMIN SERPL-MCNC: 3.6 G/DL (ref 3.4–5)
ALP BLD-CCNC: 81 U/L (ref 40–129)
ALT SERPL-CCNC: 7 U/L (ref 10–40)
ANION GAP SERPL CALCULATED.3IONS-SCNC: 12 MMOL/L (ref 3–16)
AST SERPL-CCNC: 15 U/L (ref 15–37)
BASOPHILS ABSOLUTE: 0.1 K/UL (ref 0–0.2)
BASOPHILS RELATIVE PERCENT: 1.1 %
BILIRUB SERPL-MCNC: <0.2 MG/DL (ref 0–1)
BUN BLDV-MCNC: 10 MG/DL (ref 7–20)
C-REACTIVE PROTEIN: 33.8 MG/L (ref 0–5.1)
CALCIUM SERPL-MCNC: 8.8 MG/DL (ref 8.3–10.6)
CHLORIDE BLD-SCNC: 107 MMOL/L (ref 99–110)
CO2: 23 MMOL/L (ref 21–32)
CREAT SERPL-MCNC: 0.8 MG/DL (ref 0.9–1.3)
EOSINOPHILS ABSOLUTE: 0.1 K/UL (ref 0–0.6)
EOSINOPHILS RELATIVE PERCENT: 1.5 %
GFR AFRICAN AMERICAN: >60
GFR NON-AFRICAN AMERICAN: >60
GLOBULIN: 2.7 G/DL
GLUCOSE BLD-MCNC: 83 MG/DL (ref 70–99)
HCT VFR BLD CALC: 37.8 % (ref 40.5–52.5)
HEMOGLOBIN: 12.9 G/DL (ref 13.5–17.5)
LYMPHOCYTES ABSOLUTE: 2.3 K/UL (ref 1–5.1)
LYMPHOCYTES RELATIVE PERCENT: 41.6 %
MCH RBC QN AUTO: 29.8 PG (ref 26–34)
MCHC RBC AUTO-ENTMCNC: 34.1 G/DL (ref 31–36)
MCV RBC AUTO: 87.4 FL (ref 80–100)
MONOCYTES ABSOLUTE: 0.4 K/UL (ref 0–1.3)
MONOCYTES RELATIVE PERCENT: 8 %
NEUTROPHILS ABSOLUTE: 2.6 K/UL (ref 1.7–7.7)
NEUTROPHILS RELATIVE PERCENT: 47.8 %
PDW BLD-RTO: 13 % (ref 12.4–15.4)
PLATELET # BLD: 257 K/UL (ref 135–450)
PMV BLD AUTO: 8.5 FL (ref 5–10.5)
POTASSIUM SERPL-SCNC: 3.9 MMOL/L (ref 3.5–5.1)
RBC # BLD: 4.33 M/UL (ref 4.2–5.9)
SEDIMENTATION RATE, ERYTHROCYTE: 28 MM/HR (ref 0–15)
SODIUM BLD-SCNC: 142 MMOL/L (ref 136–145)
TOTAL PROTEIN: 6.3 G/DL (ref 6.4–8.2)
WBC # BLD: 5.5 K/UL (ref 4–11)

## 2020-02-26 PROCEDURE — 96413 CHEMO IV INFUSION 1 HR: CPT | Performed by: INTERNAL MEDICINE

## 2020-02-26 PROCEDURE — 36415 COLL VENOUS BLD VENIPUNCTURE: CPT | Performed by: INTERNAL MEDICINE

## 2020-02-26 RX ORDER — INFLIXIMAB 100 MG/10ML
300 INJECTION, POWDER, LYOPHILIZED, FOR SOLUTION INTRAVENOUS ONCE
Status: COMPLETED | OUTPATIENT
Start: 2020-02-26 | End: 2020-02-26

## 2020-02-26 RX ADMIN — INFLIXIMAB 300 MG: 100 INJECTION, POWDER, LYOPHILIZED, FOR SOLUTION INTRAVENOUS at 10:20

## 2020-02-26 NOTE — PROGRESS NOTES
Pt here for Remicade infusion #3 , no f/u  visit with Dr. Iris Chen, today. No  Adverse effects from previous infusion, cbc, cmp, crp, and sed rate drawn.   132.6 lbs =60.2 kg   X 5 mg/kg = 301 mg   Rounded to 300 mg. Infusion  tolerated well. Next visit scheduled  in 8 weeks.     IV Gauge: 22  IV Site: Left A/C  # of Attempts: 1  IV Start: 10:20 am  IV Stop: 11:20 am      IV Volume:250 ml NSS  IV Bag Lot# [id number]  IV Bag EXP: 04/2021

## 2020-02-27 NOTE — RESULT ENCOUNTER NOTE
Pleaese let pt know that his inflammatory markers are elevated, I see that he just got his 3rd dose of Remicade yesterday - this will take some time to take into effect. Is he having an arthritis flare up? If so please offer Prednisone taper, please offer sooner appt than April in [place].

## 2020-03-27 ENCOUNTER — TELEPHONE (OUTPATIENT)
Dept: INTERNAL MEDICINE CLINIC | Age: 30
End: 2020-03-27

## 2020-03-27 NOTE — TELEPHONE ENCOUNTER
Please provide work excuse letter, rheum Dx of ankylosing spondylitis and Crohn's disease. Pt is on immunosuppression, we recommend either working from home if possible or taking a leave of absence for the 1-2 months until the [place] outbreak is better contained.

## 2020-04-01 ENCOUNTER — TELEPHONE (OUTPATIENT)
Dept: INTERNAL MEDICINE CLINIC | Age: 30
End: 2020-04-01

## 2020-04-02 ENCOUNTER — TELEPHONE (OUTPATIENT)
Dept: INTERNAL MEDICINE CLINIC | Age: 30
End: 2020-04-02

## 2020-04-07 ENCOUNTER — TELEPHONE (OUTPATIENT)
Dept: RHEUMATOLOGY | Age: 30
End: 2020-04-07

## 2020-04-07 NOTE — PROGRESS NOTES
TELEHEALTH EVALUATION -- Audio/Visual (During UAYQE-46 public health emergency)    Pursuant to the emergency declaration under the [facility], [facility] waiver authority and the [employer] Act, this Virtual  Visit was conducted, with patient's consent, to reduce the patient's risk of exposure to COVID-19 and provide continuity of care for an established patient. Services were provided through a video synchronous discussion virtually to substitute for in-person clinic visit. RHEUMATOLOGY TELEHEALTH VIDEO VISIT NOTE    SUBJECTIVE:    Background:   Heavenly Martinez (:  1990) has requested an audio/video evaluation for the following rheumatologic concern(s):    Heavenly Martinez is a 31 y.o. male w/ AS (+HLA B27, elevated inflammatory markers, chronic inflammatory back pain, MRI evidence of b/l sacroiliitis w/ peripheral involvement, Hx of plantar fasciitis) in setting of Bx proven CD (follows w/ GI, Dr. David Maguire) and Bx proven discoid lupus (follows w/ Dermatologist, Dr. Monique Miranda).  PMHx pertinent for severe vitamin D deficiency, HTN, PTSD, and anxiety.     Current rheum meds:  Remicade 5 mg/kg IV Q8wk, received 1st dose on 19   Tramadol 50 mg TID (per PM)  weekly Cholecalciferol 50,000 units w/ daily calcium citrate 1000 mg  Clobetasol 0.05% ointment BID (per Dermatology)    Prior rheum meds:  Humira 40 mg SC Q2wk: cleared by Derm and GI, took from 3/15/19 to , switched to Remicade d/t partial response, elevated CRP  Mesalamine (GI): pt decided to d/c in     Past medical/surgical history, medications and allergies are reviewed and updated as appropriate. Interval Hx:   Pt received his 3rd dose of Remicade on 20. He feels Remicade is not as effective as Humira for both his joint Sx and Crohn's disease. He feels the medicine starts to wear off after 4 wks.   He reports chronic pain 2.7 02/26/2020     NENITA negative (10/9/18)     Interval labs from 1/16/19:  UA w/ micro: no protein or active urinary sediments  Positive HLA-B27  Negative SSA, SSB  Negative hepatitis B and C serologies, HIV screen, QuantiFERON TB     Negative NENITA, C3 and C4 wnl (2/20/19)  C3, C4, dsDNA wnl (8/21/19)     Vitamin D 25 OH 41.9 (8/21/19)     CRP 9 mg/dL, ESR 63 (10/9/18) --> CRP 82.4 mg/L, ESR 80 (1/16/19) --> CRP 17.2 mg/L, ESR 20 (5/8/19) --> CRP 15.8 mg/L, ESR 18 (8/21/19) --> CRP 33.8 mg/L, ESR 28 (2/26/20)    Imaging:  I personally reviewed interval imaging and discussed w/ the pt in detail which included:  X-ray L-spine (8/6/18): Severe sclerosis of the iliac side of both SI joints w/ serrated appearance of the SI joints. Findings are most c/w seronegative spondyloarthropathy (HLA B27 spondyloarthropathy), correlate clinically. Otherwise normal exam.     MRI C-spine (9/14/15): No signal abnormality within the visualized cord. Slight reversal of the normal cervical lordosis, nonspecific. No prevertebral soft tissue swelling. Vertebral body heights are well-maintained without acute fracture or osseous STIR signal abnormality. Preservation of the disc space heights. No listhesis. No lateralizing disc herniation or significant central canal, lateral recess, or neuroforaminal stenosis. Anterior and posterior longitudinal ligaments are intact. Ligamentum flavum intact. Occipitocervical ligaments intact. No interspinous widening.     MRI C-spine (3/6/19):  1. Extensive edema in the posterior elements of the lower C-spine extending from the level of C4-T1, L>R, as well as in the paraspinal, interspinous, and intercostal soft tissues likely related to active enthesopathy related to the pt's Hx of AS. 2. Mild central spinal canal narrowing at C3-C4, through C6-C7.      MRI T-spine (9/14/15):   IMPRESSION:   No cord signal abnormality, marrow edema, lateralizing disc herniation, or significant stenosis.     MRI L-spine

## 2020-04-08 ENCOUNTER — VIRTUAL VISIT (OUTPATIENT)
Dept: RHEUMATOLOGY | Age: 30
End: 2020-04-08
Payer: COMMERCIAL

## 2020-04-08 PROCEDURE — 99214 OFFICE O/P EST MOD 30 MIN: CPT | Performed by: INTERNAL MEDICINE

## 2020-04-22 ENCOUNTER — TELEPHONE (OUTPATIENT)
Dept: RHEUMATOLOGY | Age: 30
End: 2020-04-22

## 2020-04-22 NOTE — TELEPHONE ENCOUNTER
Please obtain PA for Remicade infusions of 300mg every 8 weeks. Infusion codes are: 06989 and 10354. He changed insurance and we need a new PA. Can you please see if we can rush this as he is continuing care?

## 2020-04-22 NOTE — TELEPHONE ENCOUNTER
Pt stated he just switched to a new insurance and is waiting for new insurance card to come in the mail. Pt will contact the office with new insurance information once it is received.

## 2020-04-26 RX ORDER — PREDNISONE 10 MG/1
TABLET ORAL
Qty: 35 TABLET | Refills: 0 | Status: SHIPPED | OUTPATIENT
Start: 2020-04-26 | End: 2020-05-26

## 2020-04-26 NOTE — TELEPHONE ENCOUNTER
Unfortunately, he will require a biologic for his back arthritis. None of the oral meds we use for inflammatory arthritis (MTX, SSZ, Leflunomide, etc.) will help his back pain. At his last visit on the 8th he told me he doesn't have any peripheral involvement (hand/wrists/ankles) so I don't think he will benefit from any of these oral meds. He can try a Prednisone taper now, I sent prescription to his [address]. I recommend that he f/u his pain doctor for pain interventions such as steroid injections. I told him to call his GI to resume Apriso for his Crohn's disease. We can also try Azathioprine for his Crohn's disease and again, this would not help with his back arthritis.

## 2020-04-27 ENCOUNTER — TELEPHONE (OUTPATIENT)
Dept: INTERNAL MEDICINE CLINIC | Age: 30
End: 2020-04-27

## 2020-04-27 NOTE — TELEPHONE ENCOUNTER
A PA was faxed to [address] [place] [address] for Remicade . Sent clinicals.   Care source has denied Remicade,  See attached denial letter

## 2020-04-29 NOTE — TELEPHONE ENCOUNTER
I have resubmitted the PA for Remicade (  ) 8mg/kg  q 6weeks with all diagnosis and this has now been approved.   Effective dates : 04/28/2020-04/28/2021  PA # [id number]

## 2020-05-05 ENCOUNTER — TELEPHONE (OUTPATIENT)
Dept: RHEUMATOLOGY | Age: 30
End: 2020-05-05

## 2020-05-05 ENCOUNTER — OFFICE VISIT (OUTPATIENT)
Dept: RHEUMATOLOGY | Age: 30
End: 2020-05-05
Payer: COMMERCIAL

## 2020-05-05 VITALS
SYSTOLIC BLOOD PRESSURE: 142 MMHG | RESPIRATION RATE: 16 BRPM | HEART RATE: 69 BPM | TEMPERATURE: 97.5 F | DIASTOLIC BLOOD PRESSURE: 60 MMHG | BODY MASS INDEX: 22.59 KG/M2 | WEIGHT: 131.6 LBS

## 2020-05-05 LAB
A/G RATIO: 1.1 (ref 1.1–2.2)
ALBUMIN SERPL-MCNC: 3.4 G/DL (ref 3.4–5)
ALP BLD-CCNC: 86 U/L (ref 40–129)
ALT SERPL-CCNC: 15 U/L (ref 10–40)
ANION GAP SERPL CALCULATED.3IONS-SCNC: 12 MMOL/L (ref 3–16)
AST SERPL-CCNC: 13 U/L (ref 15–37)
BASOPHILS ABSOLUTE: 0 K/UL (ref 0–0.2)
BASOPHILS RELATIVE PERCENT: 0.2 %
BILIRUB SERPL-MCNC: <0.2 MG/DL (ref 0–1)
BUN BLDV-MCNC: 11 MG/DL (ref 7–20)
CALCIUM SERPL-MCNC: 9 MG/DL (ref 8.3–10.6)
CHLORIDE BLD-SCNC: 105 MMOL/L (ref 99–110)
CO2: 25 MMOL/L (ref 21–32)
CREAT SERPL-MCNC: 0.6 MG/DL (ref 0.9–1.3)
EOSINOPHILS ABSOLUTE: 0 K/UL (ref 0–0.6)
EOSINOPHILS RELATIVE PERCENT: 0.4 %
GFR AFRICAN AMERICAN: >60
GFR NON-AFRICAN AMERICAN: >60
GLOBULIN: 3.1 G/DL
GLUCOSE BLD-MCNC: 80 MG/DL (ref 70–99)
HCT VFR BLD CALC: 35.9 % (ref 40.5–52.5)
HEMOGLOBIN: 11.8 G/DL (ref 13.5–17.5)
LYMPHOCYTES ABSOLUTE: 3.6 K/UL (ref 1–5.1)
LYMPHOCYTES RELATIVE PERCENT: 33.6 %
MCH RBC QN AUTO: 28.2 PG (ref 26–34)
MCHC RBC AUTO-ENTMCNC: 32.8 G/DL (ref 31–36)
MCV RBC AUTO: 86.1 FL (ref 80–100)
MONOCYTES ABSOLUTE: 0.6 K/UL (ref 0–1.3)
MONOCYTES RELATIVE PERCENT: 5.7 %
NEUTROPHILS ABSOLUTE: 6.4 K/UL (ref 1.7–7.7)
NEUTROPHILS RELATIVE PERCENT: 60.1 %
PDW BLD-RTO: 12.7 % (ref 12.4–15.4)
PLATELET # BLD: 423 K/UL (ref 135–450)
PMV BLD AUTO: 7.3 FL (ref 5–10.5)
POTASSIUM SERPL-SCNC: 3.9 MMOL/L (ref 3.5–5.1)
RBC # BLD: 4.17 M/UL (ref 4.2–5.9)
SEDIMENTATION RATE, ERYTHROCYTE: 60 MM/HR (ref 0–15)
SODIUM BLD-SCNC: 142 MMOL/L (ref 136–145)
TOTAL PROTEIN: 6.5 G/DL (ref 6.4–8.2)
WBC # BLD: 10.7 K/UL (ref 4–11)

## 2020-05-05 PROCEDURE — 96415 CHEMO IV INFUSION ADDL HR: CPT | Performed by: INTERNAL MEDICINE

## 2020-05-05 PROCEDURE — 36415 COLL VENOUS BLD VENIPUNCTURE: CPT | Performed by: INTERNAL MEDICINE

## 2020-05-05 PROCEDURE — 96413 CHEMO IV INFUSION 1 HR: CPT | Performed by: INTERNAL MEDICINE

## 2020-05-05 RX ORDER — INFLIXIMAB 100 MG/10ML
500 INJECTION, POWDER, LYOPHILIZED, FOR SOLUTION INTRAVENOUS ONCE
Status: COMPLETED | OUTPATIENT
Start: 2020-05-05 | End: 2020-05-05

## 2020-05-05 RX ADMIN — INFLIXIMAB 500 MG: 100 INJECTION, POWDER, LYOPHILIZED, FOR SOLUTION INTRAVENOUS at 11:00

## 2020-05-05 NOTE — PROGRESS NOTES
11:30 am Patient reported fell \"off\" during his IV Remicade infusion. Patient stated I feel heaviness in my chess and like I can't catch my breath. Vital taken 152/64, HR 74. Patient removed his mask and IV fluids drip reduced.      1143 am Vital 156/70 hr 76. MD notified. MD gave her okay to continue IV infusion. Will continue to monitor the patient.

## 2020-05-05 NOTE — PROGRESS NOTES
Pt here for Remicade infusion #4 , f/u  visit with Dr. Elli Herrera, today. No  Adverse effects from previous infusion, cbc, creatinine, and liver profile drawn. 131.6 lbs =59.8 kg   X 8 mg/kg = 478.5 mg   Rounded to 500 mg. Patient was given 10 mg Cetirizine HCL and 325 mg Tylenol prior to beginning the infusion. Infusion  tolerated well. Next visit scheduled  in 6 weeks.     IV Gauge: 22  IV Site: Left A/C  # of Attempts: 1  IV Start: 11:00 am  IV Stop: 12:45 pm      IV Volume:250 ml NSS  IV Bag Lot# [id number]  IV Bag EXP: 05/2021

## 2020-05-08 ENCOUNTER — TELEPHONE (OUTPATIENT)
Dept: RHEUMATOLOGY | Age: 30
End: 2020-05-08

## 2020-05-26 ENCOUNTER — TELEPHONE (OUTPATIENT)
Dept: INTERNAL MEDICINE CLINIC | Age: 30
End: 2020-05-26

## 2020-05-26 NOTE — TELEPHONE ENCOUNTER
You had given him a paper saying he should be off work through 5/30 due to [id number]---lr is calling to see if you think he  should be going back to work or not---if not he will need a note to extend that---please call the pt.  Thanks

## 2020-05-27 ENCOUNTER — TELEPHONE (OUTPATIENT)
Dept: RHEUMATOLOGY | Age: 30
End: 2020-05-27

## 2020-05-27 NOTE — TELEPHONE ENCOUNTER
E[facility] received a call from:    Name of Caller: Blaise    Relationship to patient:self    Organization name: n/a    Best contact number: [phone]    Reason for call: Pt called and was advised of notes in last encounter. Pt is requesting the return to work letter be faxed to his job at fax: [phone].  Please advise

## 2020-05-27 NOTE — TELEPHONE ENCOUNTER
Patient called back. Read Dr Elizabeth Pratt note to him. He is requesting to speak to Darío armando. He said he has to ask her something.

## 2020-05-27 NOTE — TELEPHONE ENCOUNTER
With the state lifting restrictions we are allowing our immunocompromised pts to return to work. I recommend wearing a mask, practicing good hand washing hygiene and social distancing as much as possible.

## 2020-05-28 ENCOUNTER — TELEPHONE (OUTPATIENT)
Dept: INTERNAL MEDICINE CLINIC | Age: 30
End: 2020-05-28

## 2020-05-29 ENCOUNTER — TELEPHONE (OUTPATIENT)
Dept: INTERNAL MEDICINE CLINIC | Age: 30
End: 2020-05-29

## 2020-05-29 ENCOUNTER — PATIENT MESSAGE (OUTPATIENT)
Dept: RHEUMATOLOGY | Age: 30
End: 2020-05-29

## 2020-05-29 NOTE — TELEPHONE ENCOUNTER
Spoke with pt he is scheduled to go to work tomorrow. I asked him if he had my chart,  letter can be scanned to him .  He does not have I did give him info on how to initiate a my chart account I asked if his job had a fax he says they no longer have a fax I see Dr Colleen Ward recommendations but have no way to get to pt at this time

## 2020-05-29 NOTE — TELEPHONE ENCOUNTER
Pt calling stating he needs that return to work letter done today so he can go back to work. I told him we can fax it to them and read off the number we had in a current note and he said that they don't have that number no more. I asked him if he can call his work to get another number he said they e-mail. He wants to know if you can e-mail it to him. I told him we don't e-mail. Please call him back today.

## 2020-05-29 NOTE — TELEPHONE ENCOUNTER
Return to work sent to pt my chart as well as emailed to his employer [email or web address] Pt informed via my chart

## 2020-06-04 ENCOUNTER — TELEPHONE (OUTPATIENT)
Dept: INTERNAL MEDICINE CLINIC | Age: 30
End: 2020-06-04

## 2020-06-16 ENCOUNTER — NURSE ONLY (OUTPATIENT)
Dept: RHEUMATOLOGY | Age: 30
End: 2020-06-16
Payer: COMMERCIAL

## 2020-06-16 VITALS
WEIGHT: 132 LBS | SYSTOLIC BLOOD PRESSURE: 138 MMHG | RESPIRATION RATE: 16 BRPM | HEART RATE: 76 BPM | DIASTOLIC BLOOD PRESSURE: 76 MMHG | BODY MASS INDEX: 22.66 KG/M2 | TEMPERATURE: 98 F

## 2020-06-16 PROCEDURE — 96413 CHEMO IV INFUSION 1 HR: CPT | Performed by: INTERNAL MEDICINE

## 2020-06-16 RX ORDER — DIPHENHYDRAMINE HYDROCHLORIDE 50 MG/ML
50 INJECTION INTRAMUSCULAR; INTRAVENOUS ONCE
Status: COMPLETED | OUTPATIENT
Start: 2020-06-16 | End: 2020-06-16

## 2020-06-16 RX ORDER — INFLIXIMAB 100 MG/10ML
500 INJECTION, POWDER, LYOPHILIZED, FOR SOLUTION INTRAVENOUS ONCE
Status: COMPLETED | OUTPATIENT
Start: 2020-06-16 | End: 2020-06-16

## 2020-06-16 RX ADMIN — DIPHENHYDRAMINE HYDROCHLORIDE 50 MG: 50 INJECTION INTRAMUSCULAR; INTRAVENOUS at 16:22

## 2020-06-16 RX ADMIN — INFLIXIMAB 500 MG: 100 INJECTION, POWDER, LYOPHILIZED, FOR SOLUTION INTRAVENOUS at 10:58

## 2020-06-16 NOTE — PROGRESS NOTES
Pt here for Remciade infusion #132, no f/u  visit with Dr. Aleisha Richard, today. No  Adverse effects from previous infusion, no blood work drawn. 132 lbs =60 kg   X 8 mg/kg = 480 mg   Rounded to 500 mg. Infusion  tolerated well. Next visit scheduled  in 6 weeks.     IV Gauge: 22  IV Site: Left A/C  # of Attempts: 1  IV Start: 10:58 am  IV Stop: 11:58 am      IV Volume:250 ml NSS  IV Bag Lot# [id number]  IV Bag EXP: 09/2021

## 2020-06-17 ENCOUNTER — TELEPHONE (OUTPATIENT)
Dept: RHEUMATOLOGY | Age: 30
End: 2020-06-17

## 2020-06-17 ENCOUNTER — VIRTUAL VISIT (OUTPATIENT)
Dept: RHEUMATOLOGY | Age: 30
End: 2020-06-17
Payer: COMMERCIAL

## 2020-06-17 PROCEDURE — G8420 CALC BMI NORM PARAMETERS: HCPCS | Performed by: INTERNAL MEDICINE

## 2020-06-17 PROCEDURE — 4004F PT TOBACCO SCREEN RCVD TLK: CPT | Performed by: INTERNAL MEDICINE

## 2020-06-17 PROCEDURE — G8428 CUR MEDS NOT DOCUMENT: HCPCS | Performed by: INTERNAL MEDICINE

## 2020-06-17 PROCEDURE — 99215 OFFICE O/P EST HI 40 MIN: CPT | Performed by: INTERNAL MEDICINE

## 2020-06-17 RX ORDER — FOLIC ACID 1 MG/1
1 TABLET ORAL DAILY
Qty: 90 TABLET | Refills: 0 | Status: SHIPPED | OUTPATIENT
Start: 2020-06-17 | End: 2020-07-27

## 2020-06-17 RX ORDER — HYDROXYCHLOROQUINE SULFATE 200 MG/1
200 TABLET, FILM COATED ORAL DAILY
Qty: 30 TABLET | Refills: 2 | Status: SHIPPED | OUTPATIENT
Start: 2020-06-17 | End: 2020-06-18 | Stop reason: SDUPTHER

## 2020-06-17 NOTE — TELEPHONE ENCOUNTER
Please obtain PA for Simponi Aria IV () infusions thru [facility]. Infusion code 24266.    2mg/kg IV with loading dose and 0 and 4 weeks, then q8w for ankylosing spondylitis. Pt had an allergic reaction to Remicade and can no longer take. Tried and failed Humira. Also has Crohn's disease.

## 2020-06-17 NOTE — PATIENT INSTRUCTIONS
to make up the missed dose. What happens if I overdose? Seek emergency medical attention or call the Poison Help line at [phone]. An overdose of hydroxychloroquine can be fatal, especially in children. Treatment of a hydroxychloroquine overdose must be started quickly. You may be told to induce vomiting right away (at home, before transport to an emergency room). Ask the poison control center how to induce vomiting in the case of a hydroxychloroquine overdose. Overdose symptoms may include headache, drowsiness, vision changes, slow heart rate, chest pain or heavy feeling, pain spreading to the arm or shoulder, nausea, sweating, seizure (convulsions), shallow breathing, or breathing that stops. What should I avoid while taking hydroxychloroquine? Avoid taking an antacid or Kaopectate (kaolin-pectin) within 4 hours before or after you take hydroxychloroquine. Some antacids can make it harder for your body to absorb hydroxychloroquine. What are the possible side effects of hydroxychloroquine? Some people taking this medication over long periods of time or at high doses have developed irreversible damage to the retina of the eye. Stop taking hydroxychloroquine and call your doctor at once if you have trouble focusing, if you see light streaks or flashes in your vision, or if you notice any swelling or color changes in your eyes. Get emergency medical help if you have any of these signs of an allergic reaction: hives; difficulty breathing; swelling of your face, lips, tongue, or throat. Call your doctor at once if you have a serious side effect such as:    muscle weakness, twitching, or uncontrolled movement;    loss of balance or coordination;    blurred vision, light sensitivity, seeing halos around lights;    pale skin, easy bruising or bleeding;    confusion, unusual thoughts or behavior; or    seizure (convulsions).   Less serious side effects may include:    headache, ringing in your ears, spinning sensation;    nausea, vomiting, stomach pain;    loss of appetite, weight loss;    mood changes, feeling nervous or irritable;    skin rash or itching; or    hair loss.    This is not a complete list of side effects and others may occur. Call your doctor for medical advice about side effects. You may report side effects to FDA at 8-992-KYP-6283. What other drugs will affect hydroxychloroquine? Hydroxychloroquine can harm your liver. This effect is increased when you also use other medicines harmful to the liver. You may need dose adjustments or special tests if you have recently used:    acetaminophen (Tylenol);    an antibiotic, antifungal medicine, sulfa drug, or tuberculosis medicine;    birth control pills or hormone replacement therapy;    blood pressure medication;    cancer medications;    cholesterol-lowering medications such as Crestor, Lipitor, Pravachol, Simcor, Vytorin, Zocor;    gout or arthritis medications (including gold injections);    HIV/AIDS medications;    medicines to treat psychiatric disorders;    an NSAID such as Advil, Aleve, Arthrotec, Cataflam, Celebrex, Indocin, Motrin, Naprosyn, Treximet, Voltaren; or    seizure medications.    This list is not complete and other drugs may interact with hydroxychloroquine. Tell your doctor about all medications you use. This includes prescription, over-the-counter, vitamin, and herbal products. Do not start a new medication without telling your doctor. Where can I get more information? Your pharmacist can provide more information about hydroxychloroquine. Remember, keep this and all other medicines out of the reach of children, never share your medicines with others, and use this medication only for the indication prescribed.    Every effort has been made to ensure that the information provided by [address] is accurate, up-to-date, and complete, but no guarantee is made to that effect. Drug information contained herein may be time sensitive. [facility] information has been compiled for use by healthcare practitioners and consumers in the [place] and therefore [facility] does not warrant that uses outside of the [place] are appropriate, unless specifically indicated otherwise. [facility]'s drug information does not endorse drugs, diagnose patients or recommend therapy. [facility] drug information is an informational resource designed to assist licensed healthcare practitioners in caring for their patients and/or to serve consumers viewing this service as a supplement to, and not a substitute for, the expertise, skill, knowledge and judgment of healthcare practitioners. The absence of a warning for a given drug or drug combination in no way should be construed to indicate that the drug or drug combination is safe, effective or appropriate for any given patient. [facility] does not assume any responsibility for any aspect of healthcare administered with the aid of information [facility] provides. The information contained herein is not intended to cover all possible uses, directions, precautions, warnings, drug interactions, allergic reactions, or adverse effects. If you have questions about the drugs you are taking, check with your doctor, nurse or pharmacist.   Copyright 0034-1556 [address]: 7.01. Revision date: 11/13/2012. This information does not replace the advice of a doctor. [employer] disclaims any warranty or liability for your use of this information. Content Version: 96.1.394572    METHOTREXATE DOSING    1. Start taking 4 tablets of Methotrexate once a week. 2. Check laboratory studies in 4 weeks after starting the Methotrexate  3. Take Folic Acid 1 mg daily     Hold Methotrexate if you are on antibiotics. Restart once and have recovered from the infection. No alcohol while on Methotrexate!     PLEASE CALL WITH ANY QUESTIONS OR CONCERNS body fluids, handling contaminated trash or laundry or changing diapers. Wash hands before and after removing gloves. Wash soiled clothing and linens separately from other laundry. Body fluids should not be handled by a woman who is pregnant or who may become pregnant. Use condoms during sexual activity to avoid exposure to body fluids. Avoid exposure to sunlight or artificial UV rays (sunlamps or tanning beds), especially if you are being treated for psoriasis. Methotrexate can make your skin more sensitive to sunlight and your psoriasis may worsen. Avoid drinking alcohol while taking methotrexate. What are the possible side effects of methotrexate? Get emergency medical help if you have signs of an allergic reaction: hives; difficulty breathing; swelling of your face, lips, tongue, or throat. Stop using methotrexate and call your doctor at once if you have:    dry cough, shortness of breath;    diarrhea, vomiting, white patches or sores inside your mouth or on your lips;    blood in your urine or stools;    swelling, rapid weight gain, little or no urinating;    seizure (convulsions);    fever, chills, body aches, flu symptoms;    pale skin, easy bruising, unusual bleeding, weakness, feeling light-headed or short of breath;    liver problems --nausea, upper stomach pain, itching, tired feeling, loss of appetite, dark urine, kristina-colored stools, jaundice (yellowing of the skin or eyes); or    severe skin reaction --fever, sore throat, swelling in your face or tongue, burning in your eyes, skin pain, followed by a red or purple skin rash that spreads (especially in the face or upper body) and causes blistering and peeling. Older adults may be more likely to have side effects from this medicine. Common side effects may include:    vomiting, upset stomach;    headache, dizziness, tired feeling; or    blurred vision. This is not a complete list of side effects and others may occur.  Call your

## 2020-06-17 NOTE — TELEPHONE ENCOUNTER
Submitted for PA. No VOB needed under [facility]. If approved, will be covered 100%.

## 2020-06-17 NOTE — PROGRESS NOTES
TELEHEALTH EVALUATION -- Audio/Visual (During XGPKG-10 public health emergency)    Pursuant to the emergency declaration under the [facility] waiver authority and the [employer] Act, this Virtual  Visit was conducted, with patient's consent, to reduce the patient's risk of exposure to COVID-19 and provide continuity of care for an established patient. Services were provided through a video synchronous discussion virtually to substitute for in-person clinic visit. RHEUMATOLOGY TELEHEALTH VIDEO VISIT NOTE    SUBJECTIVE:    Background:   Gordo Malik (:  1990) has requested an audio/video evaluation for the following rheumatologic concern(s):    Gordo Malik is a 31 y.o. male w/ AS (+HLA B27, elevated inflammatory markers, chronic inflammatory back pain, MRI evidence of active enthesopathy b/l sacroiliitis w/ peripheral involvement, Hx of plantar fasciitis) in setting of Bx proven CD (follows w/ GI, Dr. Jessy Saab) and Bx proven discoid lupus (follows w/ Dermatologist, Dr. Arnold Staples).  PMHx pertinent for severe vitamin D deficiency, HTN, PTSD, and anxiety.     Current rheum meds:  Tramadol 50 mg TID (per PM)  weekly Cholecalciferol 50,000 units w/ daily calcium citrate 1000 mg  Clobetasol 0.05% ointment BID (per Dermatology)    Prior rheum meds:  Humira 40 mg SC Q2wk: cleared by Derm and GI, took from 3/15/19 to , switched to Remicade d/t partial response, elevated CRP  Remicade 8 mg/kg IV Q6wk, received 1st dose on 19, developed infusion reaction during 5th infusion on 20  Mesalamine (GI): pt decided to d/c in     Past medical/surgical history, medications and allergies are reviewed and updated as appropriate.     Interval Hx:   Pt developed facial flushing, conjunctival injection, elevated BP and HR and oxygen desaturation during his 5th Remicade infusion yesterday Take 1 tablet by mouth daily          Orders Placed This Encounter   Procedures    ALT     Standing Status:   Future     Standing Expiration Date:   6/17/2021    AST     Standing Status:   Future     Standing Expiration Date:   6/17/2021    CBC Auto Differential     Standing Status:   Future     Standing Expiration Date:   12/17/2020    C-Reactive Protein     Standing Status:   Future     Standing Expiration Date:   12/17/2020    Creatinine, Serum     Standing Status:   Future     Standing Expiration Date:   6/17/2021    NENITA Reflex to Antibody Cascade     Standing Status:   Future     Standing Expiration Date:   12/17/2020    C3 Complement     Standing Status:   Future     Standing Expiration Date:   12/17/2020    C4 Complement     Standing Status:   Future     Standing Expiration Date:   12/17/2020     Outpatient Encounter Medications as of 6/17/2020   Medication Sig Dispense Refill    methotrexate (RHEUMATREX) 2.5 MG chemo tablet Take 4 tablets by mouth once a week 16 tablet 0    hydroxychloroquine (PLAQUENIL) 200 MG tablet Take 1 tablet by mouth daily 30 tablet 2    folic acid (FOLVITE) 1 MG tablet Take 1 tablet by mouth daily 90 tablet 0    traMADol (ULTRAM) 50 MG tablet tramadol 50 mg tablet   Take 1 tablet 3 times a day by oral route as needed for 30 days.  CALCIUM CITRATE, BARIATRIC ADVANTAGE, 500MG LOZENGE Take 1 lozenge by mouth 2 times daily 270 lozenge 0    Cholecalciferol 11005 units TABS Take 50,000 Units by mouth once a week For 12 weeks then take over-the-counter vitamin D3 2000 units daily. (Patient taking differently: Take 2,000 Units by mouth once a week For 12 weeks then take over-the-counter vitamin D3 2000 units daily.) 12 tablet 0    fluocinolone 0.01 % cream Apply topically 2 times daily      hydrocortisone 0.5 % cream Apply topically 2 times daily Apply topically 2 times daily.       amLODIPine (NORVASC) 10 MG tablet Take 10 mg by mouth daily      metoprolol tartrate (LOPRESSOR) 50 MG tablet Take 50 mg by mouth 2 times daily      ferrous sulfate 325 (65 Fe) MG tablet Take 325 mg by mouth daily (with breakfast)      mesalamine (APRISO) 0.375 g extended release capsule Take 1.5 g by mouth daily       No facility-administered encounter medications on file as of 6/17/2020. Return in about 2 months (around 8/17/2020) for lab result discussion and treatment plan, medication monitoring. The risks and benefits of my recommendations, as well as other treatment options, benefits and side effects were discussed with the patient today. Questions were answered. --Rufina Gomez MD on 6/17/2020 at 5:12 PM    An electronic signature was used to authenticate this note.

## 2020-06-18 RX ORDER — HYDROXYCHLOROQUINE SULFATE 200 MG/1
200 TABLET, FILM COATED ORAL DAILY
Qty: 30 TABLET | Refills: 2 | Status: SHIPPED | OUTPATIENT
Start: 2020-06-18 | End: 2020-07-18

## 2020-06-18 NOTE — TELEPHONE ENCOUNTER
A form for [facility] was filled out and faxed to [phone] with clinicals and labs. ALYSSA Tsang.

## 2020-06-18 NOTE — TELEPHONE ENCOUNTER
Patient called states he went to [facility] to get medication refills-folic acid, methotrexate and plaquenil. [facility] transfer to [facility] due to insurance reasons. When he went to [facility] to get RX's he wasn't given plaquenil. Will need a refill called into [facility] on file.

## 2020-06-19 ENCOUNTER — TELEPHONE (OUTPATIENT)
Dept: RHEUMATOLOGY | Age: 30
End: 2020-06-19

## 2020-06-19 NOTE — TELEPHONE ENCOUNTER
Pt requesting for referral to outside Pain Management. What happened to his current Pain Management in [place]? He has seen several pain clinics. I made an external referral, please fax referral to his requested Pain Management office. Please also let pt know that I do not fill out disability paperwork. Please have him call his insurance for Occupational Medicine who does this, I made an external referral as [place] doesn't have Occupational Medicine.

## 2020-06-23 NOTE — TELEPHONE ENCOUNTER
Patient called back. [facility]  He is talking about papers he faxed over to office. I did read Dr Osmany Ball message to him. Please call patient back.

## 2020-06-23 NOTE — TELEPHONE ENCOUNTER
Called patient back-he is requesting to get disability papers filled out, I left message to call his insurance company ( occupational therapy) to help him. Dr Everette Dowell don;t fill out disability or SS paper work.

## 2020-06-23 NOTE — TELEPHONE ENCOUNTER
Pt called back and I spoke with him and advised him of Dr Francine Hi message Pt said he sent the wrong paper he meant to send paperwork from [employer] [facility] administration

## 2020-06-23 NOTE — TELEPHONE ENCOUNTER
Called [facility] - let pt know Dr Rosalva Ariza does not do disability papers and that she wants pt to check with his insurance   ( Occupational Therapy) Order in chart

## 2020-06-24 ENCOUNTER — TELEPHONE (OUTPATIENT)
Dept: RHEUMATOLOGY | Age: 30
End: 2020-06-24

## 2020-06-30 ENCOUNTER — TELEPHONE (OUTPATIENT)
Dept: INTERNAL MEDICINE CLINIC | Age: 30
End: 2020-06-30

## 2020-06-30 NOTE — TELEPHONE ENCOUNTER
Please call pt back, we previously discussed w/ pt that I do not do disability evaluation. I have referred him to Occupational Medicine.

## 2020-06-30 NOTE — TELEPHONE ENCOUNTER
Pt calling about papers you were going to fill out for him (he is hard to understand ) please call him at [phone]. Thanks.

## 2020-07-01 NOTE — TELEPHONE ENCOUNTER
Called patient and explained a few times to call his insurance and ask for occupational health to get his disability paper work filled out.

## 2020-07-03 NOTE — TELEPHONE ENCOUNTER
Please let pt know his insurance declined Girish Gillespie. We can try Cimzia which is still a TNF-I (belongs to the same class of medicine as the Humira and Remicade infusions that he tried in the past). This is an injection, starting w/ 400 mg SC x 1 on wk 0, 2, 4 followed by 400 mg SC Q4wk. He should get a repeat Quantiferon TB test done now. I sent prescription on Cimzia to [facility] [place], please f/u on PA.

## 2020-07-06 ENCOUNTER — TELEPHONE (OUTPATIENT)
Dept: INTERNAL MEDICINE CLINIC | Age: 30
End: 2020-07-06

## 2020-07-06 NOTE — TELEPHONE ENCOUNTER
Called pt and left voicemail, explained that his insurance declined to cover for [employer] and mandated that we try another TNF-I called Cimzia first.  Explained that Cimzia is in the same class of medicine as Remicade and Humira that he has tried in the past.

## 2020-07-06 NOTE — TELEPHONE ENCOUNTER
Pt called concerning the medication you said you was going to switch him to. Did not know much about what he was talking about.   Please give him a call back

## 2020-07-06 NOTE — TELEPHONE ENCOUNTER
Patient states he is returning a call about an injection. He had questions about if he should come in the office for the injection. Advised patient Dr Freddie Mcintyre is out of the office today and someone will call him tomorrow.

## 2020-07-06 NOTE — TELEPHONE ENCOUNTER
Please refer to my previous messages, he will be giving himself these injections at home just like he did with the Humira.

## 2020-07-07 ENCOUNTER — TELEPHONE (OUTPATIENT)
Dept: RHEUMATOLOGY | Age: 30
End: 2020-07-07

## 2020-07-09 ENCOUNTER — TELEPHONE (OUTPATIENT)
Dept: INTERNAL MEDICINE CLINIC | Age: 30
End: 2020-07-09

## 2020-07-09 NOTE — TELEPHONE ENCOUNTER
Patient's mom called back-while patient was calling on other line at same time. I gave message to mom regarding changing Humira to Cimzia-will need a PA-for patient to wait to hear from our office regarding the PA and how to administer medication.  She was understanding

## 2020-07-13 ENCOUNTER — TELEPHONE (OUTPATIENT)
Dept: INTERNAL MEDICINE CLINIC | Age: 30
End: 2020-07-13

## 2020-07-16 ENCOUNTER — TELEPHONE (OUTPATIENT)
Dept: INTERNAL MEDICINE CLINIC | Age: 30
End: 2020-07-16

## 2020-07-16 NOTE — TELEPHONE ENCOUNTER
Please update pt, his Cimzia has been approved. We worked very hard to get this approved for him. Please have him call his pain doctor for pain medicine.

## 2020-07-17 LAB
ALT SERPL-CCNC: <5 U/L (ref 10–40)
AST SERPL-CCNC: 12 U/L (ref 15–37)
BASOPHILS ABSOLUTE: 0.1 K/UL (ref 0–0.2)
BASOPHILS RELATIVE PERCENT: 1 %
C-REACTIVE PROTEIN: 94.9 MG/L (ref 0–5.1)
C3 COMPLEMENT: 158.1 MG/DL (ref 90–180)
C4 COMPLEMENT: 31.4 MG/DL (ref 10–40)
CREAT SERPL-MCNC: 0.7 MG/DL (ref 0.9–1.3)
EOSINOPHILS ABSOLUTE: 0.1 K/UL (ref 0–0.6)
EOSINOPHILS RELATIVE PERCENT: 1.9 %
GFR AFRICAN AMERICAN: >60
GFR NON-AFRICAN AMERICAN: >60
HCT VFR BLD CALC: 37 % (ref 40.5–52.5)
HEMOGLOBIN: 12.3 G/DL (ref 13.5–17.5)
LYMPHOCYTES ABSOLUTE: 1.7 K/UL (ref 1–5.1)
LYMPHOCYTES RELATIVE PERCENT: 31.4 %
MCH RBC QN AUTO: 28.4 PG (ref 26–34)
MCHC RBC AUTO-ENTMCNC: 33.1 G/DL (ref 31–36)
MCV RBC AUTO: 85.5 FL (ref 80–100)
MONOCYTES ABSOLUTE: 0.4 K/UL (ref 0–1.3)
MONOCYTES RELATIVE PERCENT: 7.1 %
NEUTROPHILS ABSOLUTE: 3.1 K/UL (ref 1.7–7.7)
NEUTROPHILS RELATIVE PERCENT: 58.6 %
PDW BLD-RTO: 14.9 % (ref 12.4–15.4)
PLATELET # BLD: 416 K/UL (ref 135–450)
PMV BLD AUTO: 7.5 FL (ref 5–10.5)
RBC # BLD: 4.32 M/UL (ref 4.2–5.9)
WBC # BLD: 5.3 K/UL (ref 4–11)

## 2020-07-17 NOTE — TELEPHONE ENCOUNTER
Crystal were you able to call this pt yesterday? Please return call. I know Flipmejia Ailyn has called and explained to the pt multiple times why his infusions were declined by his insurance (declined [employer], we just got Cimzia approved). He has called us numerous times and we have explained his insurance denials to both the pt and his mother several times. If he still does not understand please have him make a f/u visit w/ me.

## 2020-07-17 NOTE — TELEPHONE ENCOUNTER
Called and spoke with patient and [facility] [address]. Explained that this is an injection that he will administer himself. He wanted to know why he couldn't do it in here with the nurse (speaking of infusions). I let him know the infusions were denied and he had the reaction to Remicade, so that medication is no longer an option. He then asked why he could not go back onto Humira. I explained that the PA was done for Cimzia and if we proceed with Humira, he will have to wait longer for another PA. I directed him that Dr. Miranda Hansen would like him to take the Cimzia and that it is an injection that he will give himself, as he did Humira. Her verbalized and \"ok. \"      Advised he will need to contact pain management for pain medication.

## 2020-07-17 NOTE — TELEPHONE ENCOUNTER
PT WOULD LIKE A CALL BACK. Pt came in and got blood work done and he was asking about when he will be getting his injections. He also wants to know why he can't go back on the infusions. Please call him.  Thanks

## 2020-07-18 LAB — ANTI-NUCLEAR ANTIBODY (ANA): NEGATIVE

## 2020-07-21 LAB
QUANTI TB GOLD PLUS: NORMAL
QUANTI TB1 MINUS NIL: 0 IU/ML (ref 0–0.34)
QUANTI TB2 MINUS NIL: 0 IU/ML (ref 0–0.34)
QUANTIFERON MITOGEN: 0.33 IU/ML
QUANTIFERON NIL: 0.01 IU/ML

## 2020-07-22 NOTE — RESULT ENCOUNTER NOTE
TB test indeterminate likely inaccurate result d/t his immunosuppression. I need him to come to our [facility] lab and repeat TB test (order placed under miscellaneous) ASAP, need to verify result before starting Cimzia.   Please ask pt if he's having any persistent cough, night sweats or has had any known TB expoure - if so we need CXR and ID referral.

## 2020-07-23 DIAGNOSIS — L93.0 DISCOID LUPUS: ICD-10-CM

## 2020-07-23 DIAGNOSIS — K50.90 CROHN'S DISEASE WITHOUT COMPLICATION, UNSPECIFIED GASTROINTESTINAL TRACT LOCATION (HCC): ICD-10-CM

## 2020-07-23 DIAGNOSIS — Z79.899 HIGH RISK MEDICATION USE: ICD-10-CM

## 2020-07-23 DIAGNOSIS — T80.90XD INFUSION REACTION, SUBSEQUENT ENCOUNTER: ICD-10-CM

## 2020-07-23 DIAGNOSIS — Z51.81 ENCOUNTER FOR THERAPEUTIC DRUG MONITORING: ICD-10-CM

## 2020-07-23 DIAGNOSIS — M45.0 ANKYLOSING SPONDYLITIS OF MULTIPLE SITES IN SPINE (HCC): ICD-10-CM

## 2020-07-23 DIAGNOSIS — R76.12 REACTION TO QUANTIFERON-TB TEST: Primary | ICD-10-CM

## 2020-07-23 RX ORDER — PREDNISONE 10 MG/1
TABLET ORAL
COMMUNITY
End: 2020-07-24

## 2020-07-23 NOTE — TELEPHONE ENCOUNTER
patient stopped in to repeat TB lab-patient is wanting to know if you would call in some prednisone for his pain while waiting on the Cimzia to be approved-is still in process-patient is aware

## 2020-07-24 ENCOUNTER — TELEPHONE (OUTPATIENT)
Dept: INTERNAL MEDICINE CLINIC | Age: 30
End: 2020-07-24

## 2020-07-24 RX ORDER — PREDNISONE 10 MG/1
TABLET ORAL
Qty: 74 TABLET | Refills: 0 | Status: SHIPPED | OUTPATIENT
Start: 2020-07-24 | End: 2020-09-22

## 2020-07-24 RX ORDER — PREDNISONE 10 MG/1
TABLET ORAL
Qty: 30 TABLET | Refills: 0 | OUTPATIENT
Start: 2020-07-24 | End: 2020-08-23

## 2020-07-24 NOTE — TELEPHONE ENCOUNTER
Please let pt know I sent in a Prednisone taper x 60 days to his  Home	[place]. He may start taking this now for his joint pain and Crohn's disease. Again, he needs to hold off on starting Cimzia until we get his repeat TB test result back in a few days.

## 2020-07-24 NOTE — TELEPHONE ENCOUNTER
Med called to wilmar/cancelled at [facility] and updated in chart

## 2020-07-27 ENCOUNTER — TELEPHONE (OUTPATIENT)
Dept: INTERNAL MEDICINE CLINIC | Age: 30
End: 2020-07-27

## 2020-07-27 RX ORDER — HYDROXYCHLOROQUINE SULFATE 200 MG/1
200 TABLET, FILM COATED ORAL DAILY
Qty: 30 TABLET | Refills: 2 | Status: SHIPPED | OUTPATIENT
Start: 2020-07-27 | End: 2020-08-26

## 2020-07-27 RX ORDER — HYDROXYCHLOROQUINE SULFATE 200 MG/1
200 TABLET, FILM COATED ORAL DAILY
Qty: 30 TABLET | Refills: 2 | OUTPATIENT
Start: 2020-07-27 | End: 2020-08-26

## 2020-07-27 RX ORDER — FOLIC ACID 1 MG/1
1 TABLET ORAL DAILY
Qty: 90 TABLET | Refills: 0 | Status: SHIPPED | OUTPATIENT
Start: 2020-07-27 | End: 2020-12-10 | Stop reason: SDUPTHER

## 2020-07-27 RX ORDER — FOLIC ACID 1 MG/1
1 TABLET ORAL DAILY
Qty: 90 TABLET | Refills: 0 | OUTPATIENT
Start: 2020-07-27

## 2020-07-27 NOTE — TELEPHONE ENCOUNTER
Spoke with Leatha at [facility]. Clarified medication. Patient to receive loading doses then maintenance dose.

## 2020-07-27 NOTE — TELEPHONE ENCOUNTER
Called [facility] to let them know not to fill medications and that they will go to [facility].

## 2020-07-27 NOTE — TELEPHONE ENCOUNTER
Sent refills on HCQ, MTX and folic acid to [facility]. Please let pt know that I increased his MTX dose from 4 tab/wk to 6 tab/wk for better control of his arthritis. Please make sure he's been taking folic acid daily and obtains safety labs 4 wks after increasing his MTX dose. I placed a call to Dr. Amando HUTCHINS to update the pt's Tx plan, awaiting call back. Mary/Antionette please update pt that we're still waiting for his repeating TB test to result. I'd like him to get a CXR done now (placed order). I want him to hold off on starting Cimzia until we rule out latent TB.

## 2020-07-28 LAB
QUANTI TB GOLD PLUS: NORMAL
QUANTI TB1 MINUS NIL: 0.01 IU/ML (ref 0–0.34)
QUANTI TB2 MINUS NIL: 0.01 IU/ML (ref 0–0.34)
QUANTIFERON MITOGEN: 0.17 IU/ML
QUANTIFERON NIL: 0.01 IU/ML

## 2020-07-29 ENCOUNTER — HOSPITAL ENCOUNTER (OUTPATIENT)
Dept: GENERAL RADIOLOGY | Age: 30
Discharge: HOME | End: 2020-07-29
Payer: COMMERCIAL

## 2020-07-29 ENCOUNTER — HOSPITAL ENCOUNTER (OUTPATIENT)
Age: 30
Discharge: HOME | End: 2020-07-29
Payer: COMMERCIAL

## 2020-07-29 ENCOUNTER — TELEPHONE (OUTPATIENT)
Dept: INTERNAL MEDICINE CLINIC | Age: 30
End: 2020-07-29

## 2020-07-29 PROCEDURE — 71046 X-RAY EXAM CHEST 2 VIEWS: CPT

## 2020-07-29 NOTE — TELEPHONE ENCOUNTER
Pt called states his pain phys. Dr Nereida Burleson. Geoffrey Ivy is requesting records concerning pt's  diagnosis. Anklosing Spondylitis    Dr Fifi Velasco phn#:[phone]                         Fax#[phone]

## 2020-08-05 ENCOUNTER — TELEPHONE (OUTPATIENT)
Dept: INTERNAL MEDICINE CLINIC | Age: 30
End: 2020-08-05

## 2020-08-05 NOTE — TELEPHONE ENCOUNTER
Chest X-ray showed clear lungs, no signs of TB or infection. He needs to see Infectious Disease first to clear him to start Cimzia, he's scheduled to see Dr. Gino Bentley next wk. Please offer him f/u appt on 8/18/20.

## 2020-08-05 NOTE — TELEPHONE ENCOUNTER
Pt Mother calling about pt chest xray---wanting to know results---also pt has an appt 8/13---you are off this day---is there somewhere I can move him to? I offered this Thursday at 8:20 but he can not do that---please advise and also call pt Mother---thanks.

## 2020-08-11 ENCOUNTER — VIRTUAL VISIT (OUTPATIENT)
Dept: INFECTIOUS DISEASES | Age: 30
End: 2020-08-11
Payer: COMMERCIAL

## 2020-08-11 PROCEDURE — G8427 DOCREV CUR MEDS BY ELIG CLIN: HCPCS | Performed by: INTERNAL MEDICINE

## 2020-08-11 PROCEDURE — 99204 OFFICE O/P NEW MOD 45 MIN: CPT | Performed by: INTERNAL MEDICINE

## 2020-08-11 ASSESSMENT — ENCOUNTER SYMPTOMS
CONSTIPATION: 0
COUGH: 1
SHORTNESS OF BREATH: 0
WHEEZING: 0
ABDOMINAL PAIN: 0
EYE REDNESS: 0
SORE THROAT: 0
TROUBLE SWALLOWING: 0
RHINORRHEA: 0
BACK PAIN: 0
EYE DISCHARGE: 0
DIARRHEA: 0
NAUSEA: 0

## 2020-08-11 NOTE — PROGRESS NOTES
Infectious Diseases Clinic NewPatient Note    Sampson Stearns is a 31 y.o. male being evaluated by a Virtual Visit  encounter to address concerns as mentioned above. A caregiver was present when appropriate. Due to this being a TeleHealth encounter (During RAG-74 public health emergency), evaluation of the following organ systems was limited: Vitals/Constitutional/EENT/Resp/CV/GI//MS/Neuro/Skin/Heme-Lymph-Imm. Pursuant to the emergency declaration under the [address] [place] [address] and the [employer] Act, this Virtual Visit was conducted with patient's (and/or legal guardian's) consent, to reduce the patient's risk of exposure to COVID-19 and provide necessary medical care. The patient (and/or legal guardian) has also been advised to contact this office for worsening conditions or problems, and seek emergency medical treatment and/or call 911 if deemed necessary. Services were provided through an audio and/or video synchronous discussion virtually to substitute for in-person clinic visit. Patient and provider were located at their individual homes. --Tamy Hussein MD on 8/11/2020 at 11:51 AM    An electronic signature was used to authenticate this note. Reason for Visit:                Intermediate QuantiFERON test, needs clearance to resume TNF 1 inhibitor, referral from rheumatology  Primary Care Physician:  Zuleyma Villar MD  History Obtained From:   Patient , Medical Records     CHIEF COMPLAINT:    Chief Complaint   Patient presents with   [place] Regalado Patient     Dr. Xochitl Allred ref TB       HISTORY OF PRESENT ILLNESS: Sampson Stearns is a 31 y.o. pleasant male patient, who had a virtual visit with me today as a new patient. History was obtained from chart review and the patient. The patient had a virtual visit with me today for above mentioned complaints.     The patient has worked in healthcare For 12 weeks then take over-the-counter vitamin D3 2000 units daily.) 12 tablet 0    fluocinolone 0.01 % cream Apply topically 2 times daily      hydrocortisone 0.5 % cream Apply topically 2 times daily Apply topically 2 times daily.  amLODIPine (NORVASC) 10 MG tablet Take 10 mg by mouth daily      ferrous sulfate 325 (65 Fe) MG tablet Take 325 mg by mouth daily (with breakfast)      mesalamine (APRISO) 0.375 g extended release capsule Take 1.5 g by mouth daily      CALCIUM CITRATE, BARIATRIC ADVANTAGE, 500MG LOZENGE Take 1 lozenge by mouth 2 times daily 270 lozenge 0    metoprolol tartrate (LOPRESSOR) 50 MG tablet Take 50 mg by mouth 2 times daily       No current facility-administered medications for this visit. Family history: All family history was reviewed by me today    Family History   Problem Relation Age of Onset    Stroke Mother     Stroke Father     High Blood Pressure Father     Cancer Maternal Aunt     Diabetes Maternal Uncle        No family history of immunocompromising or autoimmune conditions. No h/o TBin in family or contacts      REVIEW OF SYSTEMS:      Review of Systems   Constitutional: Negative for chills, diaphoresis and fever. HENT: Negative for ear discharge, ear pain, rhinorrhea, sore throat and trouble swallowing. Eyes: Negative for discharge and redness. Respiratory: Positive for cough (Occasional, on and off). Negative for shortness of breath and wheezing. Cardiovascular: Negative for chest pain and leg swelling. Gastrointestinal: Negative for abdominal pain, constipation, diarrhea and nausea. Endocrine: Negative for polyuria. Genitourinary: Negative for dysuria, flank pain, frequency, hematuria and urgency. Musculoskeletal: Negative for back pain and myalgias. Skin: Negative for rash. Neurological: Negative for dizziness, seizures and headaches. Hematological: Does not bruise/bleed easily.    Psychiatric/Behavioral: Negative for hallucinations and suicidal ideas. All other systems reviewed and are negative. PHYSICAL EXAM:      There were no vitals filed for this visit. Physical Exam  Constitutional:       General: He is not in acute distress. Appearance: Normal appearance. He is not ill-appearing or toxic-appearing. Comments: The patient was seen today via virtual video visit   HENT:      Head: Normocephalic. Right Ear: External ear normal.      Left Ear: External ear normal.      Nose: Nose normal.      Mouth/Throat:      Pharynx: No oropharyngeal exudate. Eyes:      General: No scleral icterus. Right eye: No discharge. Left eye: No discharge. Extraocular Movements: Extraocular movements intact. Neck:      Musculoskeletal: Normal range of motion. Pulmonary:      Effort: Pulmonary effort is normal.   Musculoskeletal: Normal range of motion. General: No swelling. Skin:     Coloration: Skin is not jaundiced. Findings: No bruising or erythema. Neurological:      Mental Status: He is alert and oriented to person, place, and time. Mental status is at baseline. Gait: Gait normal.   Psychiatric:         Mood and Affect: Mood normal.         Behavior: Behavior normal.         Thought Content:  Thought content normal.         Judgment: Judgment normal.        **      DATA:  All available lab data was reviewed by me during this visit    Last CBC:  Lab Results   Component Value Date    WBC 5.3 07/17/2020    HGB 12.3 (L) 07/17/2020    HCT 37.0 (L) 07/17/2020    MCV 85.5 07/17/2020     07/17/2020    LYMPHOPCT 31.4 07/17/2020    RBC 4.32 07/17/2020    MCH 28.4 07/17/2020    MCHC 33.1 07/17/2020    RDW 14.9 07/17/2020       Last BMP:  Lab Results   Component Value Date     05/05/2020    K 3.9 05/05/2020     05/05/2020    CO2 25 05/05/2020    BUN 11 05/05/2020    CREATININE 0.7 07/17/2020    GLUCOSE 80 05/05/2020    CALCIUM 9.0 05/05/2020        Hepatic Function Panel:   Lab Results   Component Value Date    ALKPHOS 86 05/05/2020    ALT <5 07/17/2020    AST 12 07/17/2020    PROT 6.5 05/05/2020    BILITOT <0.2 05/05/2020    BILIDIR <0.2 12/30/2019    IBILI see below 12/30/2019    LABALBU 3.4 05/05/2020       Last CK: No results found for: CKTOTAL    Last ESR:  Lab Results   Component Value Date    SEDRATE 60 (H) 05/05/2020       Last CRP:  Lab Results   Component Value Date    CRP 94.9 (H) 07/17/2020       1. Imaging: All pertinent images and reports for the current visit were reviewed by me during this visit. Outside records:    Labs, Microbiology, Radiology and pertinentresults from Care everywhere, if available, were reviewed as a part of the consultation. 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 Range 1/16/2019 15:50 7/17/2020 15:49 7/23/2020 16:21   Quantiferon TB Minus NIL Latest Ref Range: Negative  NEGATIVE Indeterminate Indeterminate   Quantiferon TB1 Minus NIL Latest Ref Range: 0.00 - 0.34 IU/mL 0.01 0.00 0.01   Quantiferon TB2 Minus NIL Latest Ref Range: 0.00 - 0.34 IU/mL 0.01 0.00 0.01   QuantiFERON Mitogen Latest Units: IU/mL 6.94 0.33 0.17   QuantiFERON Nil Latest Units: IU/mL 0.01 0.01 0.01       Allergies and immunizations:       Known drug Allergies: All allergies data was reviewed by me during this visit  Patient has no known allergies. Immunizations: All immunizations were reviewed byme in detail.      Immunization History   Administered Date(s) Administered    Influenza, Quadv, IM, PF (6 mo and older Fluzone, Flulaval, Fluarix, and 3 yrs and older Afluria) 02/20/2019       Social History:  Tessie Ariza and epidemiologic history was reviewed and updated be me in detail today. Social History     Socioeconomic History    Marital status: Single     Spouse name: Not on file    Number of children: Not on file    Years of education: Not on file    Highest education level: Not on file   Occupational History    Not on file   Social Needs    Financial resource strain: Not on file    Food insecurity     Worry: Not on file     Inability: Not on file    Transportation needs     Medical: Not on file     Non-medical: Not on file   Tobacco Use    Smoking status: Current Every Day Smoker     Packs/day: 2.00    Smokeless tobacco: Never Used   Substance and Sexual Activity    Alcohol use: No    Drug use: Not on file    Sexual activity: Not on file   Lifestyle    Physical activity     Days per week: Not on file     Minutes per session: Not on file    Stress: Not on file   Relationships    Social connections     Talks on phone: Not on file     Gets together: Not on file     Attends [employer] service: Not on file     Active member of club or organization: Not on file     Attends meetings of clubs or organizations: Not on file     Relationship status: Not on file    Intimate partner violence     Fear of current or ex partner: Not on file     Emotionally abused: Not on file     Physically abused: Not on file     Forced sexual activity: Not on file   Other Topics Concern    Not on file   Social History Narrative    Not on file       IMPRESSION:    The patient is a 31 y. o.old male who  has no past medical history on file. was seen today for following problems:    1. Cough    2. Abnormal chest x-ray    3. Ankylosing spondylitis of multiple sites in spine ([facility] [address]    4. Crohn's disease without complication, unspecified gastrointestinal tract location ([address]    5. Discoid lupus    6. Screening-pulmonary TB        Discussion:      I reviewed the results of his QuantiFERON test done on 7/17/202 and 7/23/20.   The test result was indeterminate secondary to a negative mitogen response, which can be seen in settings of T-cell anergy. The patient has been born and brought up in the [place], denies any contact with patients with tuberculosis, no history of tuberculosis in his family or friends, no history of incarceration, illicit drug use. No history of travel outside the [place] in the past.    HAV screen was negative on 1/16/2019. No history of weight loss or long-term cough or hemoptysis     however, the patient does mention that he gets muscle aches, congestion symptoms on and off, which he attributes to his ankylosing spondylitis acting up. The patient had a chest x-ray done on 7/29/2020. I reviewed the chest x-ray me from 7/29/2020. The upper lobes are clear with no obvious cavitated lesions. The x-ray has been read as normal, but is not entirely normal.  The patient did have an interstitial developing pneumonia at the right lung base at that time      RECOMMENDATIONS:      Diagnostic Workup:    · Will order CT scan of the chest without contrast  · Continue to follow fever curve at home. Antimicrobials:    · The patient had negative QuantiFERON test and PPD in the past.  Recent QuantiFERON tests have been negative, likely secondary to T-cell anergy. The patient is taking steroids which can also cause an indeterminate QuantiFERON test, however, he insists that he was not taking prednisone when he had a QuantiFERON test done  · I think his chances of latent TB infection are low  · Since he did have an interstitial pneumonia-like pattern on the right lower lobe of the lung on the last chest x-ray, and does get occasional cough and congestion symptoms, I will order a CT scan of the chest without contrast to have a more detailed study of his lung tissue. · If the CT scan of the chest is unremarkable, the patient should be able to proceed with starting TNF 1 inhibitor for ankylosing spondylitis.   · He was advised to keep on watching for any signs like fever, increased coughing, sputum production, hemoptysis etc. once he gets on a TNF 1 inhibitor and notify Dr. Elli Herrera and his PCP, if he develops any of those symptoms  · Discussed with patient the strategies to stay safe from [place] 19 exposures including safe social distancing, frequent and proper hand hygiene when outside and using 3 layered mouth and nose coverings/facemasks when outside their home. Labs ordered today inEPIC:    Orders Placed This Encounter   Procedures    CT CHEST WO CONTRAST     Standing Status:   Future     Standing Expiration Date:   8/11/2021           Patient education and counseling:    · The patient was educated in detail about the side-effects of variousantibiotics and things to watch for like new rashes, lip swelling, severe reaction, worsening diarrhea, break through fever etc.  · Patient was instructed to stop antibiotics and callour office if these problems were to occur, or go to nearest ER if experiencing severe symptoms. · Discussed patient's condition and what to expect. All of the patient's questions wereaddressed in a satisfactory manner and patient verbalized understanding all instructions. Follow-up:    · Follow-up with me in ID clinic or through video visit as needed      TIME SPENTTODAY:     - Spent over 46 minutes on visit (including interval history, physical exam, review of data including labs, cultures, imaging, development and implementation of treatmentplan and coordination of care). - Over 50% of time spent with pt counseling and education. Please note that this chart was generated using Dragon dictation software. Although every effort was made to ensure the accuracy of this automated transcription, some errors in transcription may have occurred inadvertently. If you may need any clarification, please do not hesitate to contact me through EPIC or at the phone number provided below with my electronic signature. Any pictures or media included in this note were obtained after taking informed verbal consent from the patient and with their approval to include those in the patient's medical record. Thankyou for involving me in the care of your patient. If you have any additional questions,please do not hesitate to contact me.     Nydia Rodriguez MD, MPH  8/11/20 , 11:29 AM EDT   [facility] Infectious Disease   Office: [phone]  Fax: [phone]  Tuesday AM clinic: Jerem[facility] Rhoades [facility]  Thursday AM clinic: [address] [place] [address]

## 2020-08-14 ENCOUNTER — TELEPHONE (OUTPATIENT)
Dept: RHEUMATOLOGY | Age: 30
End: 2020-08-14

## 2020-08-14 NOTE — TELEPHONE ENCOUNTER
----- Message from Raiza Hendricks MD sent at 8/14/2020 12:17 PM EDT -----  Regarding: Cimzia  Please let pt know that I reviewed his recent Infectious Disease visit note, looks like Dr. Adolfo Corbin ordered a CT chest and plan is to start Cimzia if his CT chest is normal.  Please have him obtain CT chest and contact Dr. Adolfo Corbin or myself before starting Cimzia.

## 2020-08-14 NOTE — TELEPHONE ENCOUNTER
Spoke with pt and made aware to have CT scan chest if all clear he will start med ( but please call office first) Pt voiced understanding

## 2020-08-21 ENCOUNTER — HOSPITAL ENCOUNTER (OUTPATIENT)
Dept: CT IMAGING | Age: 30
Discharge: HOME | End: 2020-08-21
Payer: COMMERCIAL

## 2020-08-21 PROCEDURE — 71250 CT THORAX DX C-: CPT

## 2020-08-24 NOTE — PROGRESS NOTES
Peter Garcia can you place the level of service for this infusion visit and close out the encounter? I got routed to sign this encounter but I was not present during his visit.

## 2020-08-26 ENCOUNTER — TELEPHONE (OUTPATIENT)
Dept: INTERNAL MEDICINE CLINIC | Age: 30
End: 2020-08-26

## 2020-08-26 NOTE — TELEPHONE ENCOUNTER
Patient states he had CT chest completed and is requesting a call back with results. He states the pharmacy keeps calling him about the cimzia and he told them he has to wait for the results.

## 2020-08-26 NOTE — TELEPHONE ENCOUNTER
From: Lenora Reyes  To: SHANNEN Bazan  Sent: 8/20/2018 7:33 AM CDT  Subject: Bee Sting    Good morning! I got stung by a bee on Saturday morning around 11. According to what I’ve read on the internet, I’m having a fairly normal “mild” reaction. I got stung on my left leg below the knee. My lower leg is all red and slightly swollen. The itchiness is what is driving me crazy. I’ve put some hydrocortisone cream on it and have gotten no relief. I’m planning on getting some Benedryll today, as well. Just wondering if there’s anything else I should be doing...I’ve also tried icing it.     Thank you!  Lenora Reyes    Please have pt call Dr. Arielle Valenzuela to discuss his CT chest findings permission to start Cimzia. I have also reached out to Dr. Arielle Valenzuela myself.   Do not start Cimzia until he gets clearance from Dr. Arielle Valenzuela.

## 2020-08-27 ENCOUNTER — TELEPHONE (OUTPATIENT)
Dept: INFECTIOUS DISEASES | Age: 30
End: 2020-08-27

## 2020-08-27 NOTE — TELEPHONE ENCOUNTER
Yusufcici Downing can you please call the pt and confirm that he may start Cimzia now if he does not have any cough, SOB, CP, fever/chills. Please go over the dosing w/ him (400 mg subcutaneously x 1 on week 0, 2 and 4 followed by 400 mg subcutaneously every 4 weeks). He needs to contact us if he ever develops any of those Sx. Please also let him know that Cimzia will take several wks to start working.

## 2020-08-27 NOTE — TELEPHONE ENCOUNTER
Patient called in to get clearance to start cimzia. He had his Chest CT on 8/21/20. Please advise. Thank you.

## 2020-08-27 NOTE — TELEPHONE ENCOUNTER
I reviewed the images of CT scan of his chest from 8/21/2020. No evidence of any tubercular lesions. He has small cysts in bilateral lower lobes of lungs, which are anatomical and are an incidental finding. I do not have any objection for him to start TNF 1 inhibitor. He should keep on watching for fever, increased coughing, sputum production, hemoptysis, unexpected weight loss etc. after he starts TNF 1 inhibitor and should continue close follow-up with his PCP and Dr. Ann Villalba. Please let him know. Thanks.

## 2020-09-01 ENCOUNTER — TELEPHONE (OUTPATIENT)
Dept: INTERNAL MEDICINE CLINIC | Age: 30
End: 2020-09-01

## 2020-09-01 NOTE — TELEPHONE ENCOUNTER
Harold Kayser with [address] is requesting to speak to Dr Ac Matthews or nurse regarding prescription for cimzia. She requested a call back to pharmacist line at #[phone].

## 2020-09-03 ENCOUNTER — TELEPHONE (OUTPATIENT)
Dept: RHEUMATOLOGY | Age: 30
End: 2020-09-03

## 2020-09-03 NOTE — TELEPHONE ENCOUNTER
Spoke with [facility]. Need more clinical information in order for patient's medication to ship out later today for delievery tomorrow. Please call [phone].

## 2020-09-10 ENCOUNTER — TELEPHONE (OUTPATIENT)
Dept: INTERNAL MEDICINE CLINIC | Age: 30
End: 2020-09-10

## 2020-09-10 NOTE — TELEPHONE ENCOUNTER
Pt calling ---is currently on Cimzia but wants to switch back to Humira---likes it better---please call and let him know ---Thanks

## 2020-09-11 NOTE — TELEPHONE ENCOUNTER
Rep called back they have a nurse that trains how to administer med but with recent pandemic all is done over internet

## 2020-10-01 ENCOUNTER — OFFICE VISIT (OUTPATIENT)
Dept: RHEUMATOLOGY | Age: 30
End: 2020-10-01
Payer: COMMERCIAL

## 2020-10-01 VITALS
DIASTOLIC BLOOD PRESSURE: 74 MMHG | SYSTOLIC BLOOD PRESSURE: 104 MMHG | WEIGHT: 120 LBS | HEIGHT: 64 IN | BODY MASS INDEX: 20.49 KG/M2 | TEMPERATURE: 97.6 F

## 2020-10-01 PROCEDURE — 4004F PT TOBACCO SCREEN RCVD TLK: CPT | Performed by: INTERNAL MEDICINE

## 2020-10-01 PROCEDURE — G8420 CALC BMI NORM PARAMETERS: HCPCS | Performed by: INTERNAL MEDICINE

## 2020-10-01 PROCEDURE — G8428 CUR MEDS NOT DOCUMENT: HCPCS | Performed by: INTERNAL MEDICINE

## 2020-10-01 PROCEDURE — G8484 FLU IMMUNIZE NO ADMIN: HCPCS | Performed by: INTERNAL MEDICINE

## 2020-10-01 PROCEDURE — 99215 OFFICE O/P EST HI 40 MIN: CPT | Performed by: INTERNAL MEDICINE

## 2020-10-01 NOTE — PROGRESS NOTES
Pawan Beckman MD  [facility] ([facility]) Physicians - Rheumatology    [] [facility] HOSPITAL:  [address] [facility] [x] [facility] Office:  [address]   Office: [phone]  Fax: [phone] PROGRESS NOTE    SUBJECTIVE:    Background:   Shara Stroud is a 31 y.o. male w/ AS (+HLA B27, elevated inflammatory markers, chronic inflammatory back pain, MRI evidence of active enthesopathy b/l sacroiliitis w/ peripheral involvement, Hx of plantar fasciitis) in setting of Bx proven CD (follows w/ GI, Dr. Rajwinder Serna) and Bx proven discoid lupus (follows w/ Dermatologist, Dr. Yuniel Lopez).  PMHx pertinent for severe vitamin D deficiency, HTN, PTSD, and anxiety.     Current rheum meds:  MTX 6 tablets/wk: started in 6/20, ran out of medicine recently  Cimzia 400 mg SC x 1 on wk 0, 2, 4 followed by 400 mg SC Q4wk: started in 9/20  Meloxicam 7.5 mg daily, Gabapentin 400 mg TID and Hydrocodone-Acetaminophen: managed by Pain Management Delmer Marino)  Vitamin D3 2000 calcium citrate 1000 mg daily  Clobetasol 0.05% ointment BID (per Dermatology)     Prior rheum meds:  Humira 40 mg SC Q2wk: cleared by Derm and GI, took from 3/15/19 to 11/19, switched to Remicade d/t partial response, elevated CRP  Remicade 8 mg/kg IV Q6wk, received 1st dose on 12/30/19, developed infusion reaction during 5th infusion on 6/16/20  Mesalamine (GI): pt decided to d/c in 11/19    Past medical/surgical history, medications and allergies are reviewed and updated as appropriate. Interval Hx:   Pt was last seen 6 wks ago. He had a CT chest on 8/21/20 which did not reveal any evidence of active TB. He was cleared to start Cimzia by ID. Pt states he's had 2 injections of Cimzia so far and has tolerated the injections w/o difficulty. He also completed an oral Prednisone taper. He states he took MTX for 1-2 months until he ran out of the medicine and he forgot to get refills.   He never started HCQ, did not know this was prescribed for him. He reports significant improvement in his arthralgias in her hand joints and neck and low back pain and stiffness since starting MTX and Cimzia. Oral Prednisone also improved his pain tremendously. He reports stiffness in his hands and lower back for 30 min. Joint swelling in the hands has resolved. IBD Sx have significantly improved, he is currently having 1-2 regular non-bloody BM's per day and reports occasional abd cramping. Of note, pt states he is currently following w/ Chago Shearer (Pain Management) who recently started him on Meloxicam 7.5 mg daily, Gabapentin and Hydrocodone. He denies any Sx of active TB. He recently had a baby boy 2 months ago. He also has an 7 y/o and 3 y/o. He is applying for permanent disability. Pt states he does not consume alcohol on a regular basis. Rheumatologic ROS:  Constitutional: denies fever/chills, night sweats, unintentional weight loss  Integumentary: +photosensitivity w/ chronic discoid lupus no new lesions and chronic diffuse alopecia, denies Sx of Raynaud's phenomenon  Eyes: denies dry eyes, redness or pain  Oral cavity: +recent aphthous ulcer x 1, denies dry mouth or thrush  Cardiovascular: denies CP, palpitations, Hx of pericardial effusion or pericarditis  Respiratory: denies SOB, cough, hemoptysis, or pleurisy  Gastrointestinal: IBD Sx as above HPI  Musculoskeletal:  refer to above HPI   Psychiatric: +PTSD, anxiety on Buspirone and Escitalopram    No Known Allergies    Past Medical History:    No past medical history on file. Past Surgical History:    No past surgical history on file.     Medications:    Current Outpatient Medications   Medication Sig Dispense Refill    certolizumab pegol (CIMZIA PREFILLED) 2 X 200 MG/ML KIT injection Inject 400 mg into the skin every 28 days for 90 doses 3 kit 0    folic acid (FOLVITE) 1 MG tablet Take 1 tablet by mouth daily 90 tablet 0    Certolizumab Pegol (CIMZIA STARTER KIT) 6 X 200 MG/ML KIT Starting dose: 400 mg subcutaneously x 1 on week 0, 2 and 4 followed by 400 mg subcutaneously every 4 weeks. 3 kit 1    fluocinolone 0.01 % cream Apply topically 2 times daily      hydrocortisone 0.5 % cream Apply topically 2 times daily Apply topically 2 times daily.  amLODIPine (NORVASC) 10 MG tablet Take 10 mg by mouth daily      metoprolol tartrate (LOPRESSOR) 50 MG tablet Take 50 mg by mouth 2 times daily      ferrous sulfate 325 (65 Fe) MG tablet Take 325 mg by mouth daily (with breakfast)      traMADol (ULTRAM) 50 MG tablet tramadol 50 mg tablet   Take 1 tablet 3 times a day by oral route as needed for 30 days.  CALCIUM CITRATE, BARIATRIC ADVANTAGE, 500MG LOZENGE Take 1 lozenge by mouth 2 times daily 270 lozenge 0    Cholecalciferol 84005 units TABS Take 50,000 Units by mouth once a week For 12 weeks then take over-the-counter vitamin D3 2000 units daily. (Patient not taking: Reported on 10/1/2020) 12 tablet 0    mesalamine (APRISO) 0.375 g extended release capsule Take 1.5 g by mouth daily       No current facility-administered medications for this visit.          OBJECTIVE:  Physical Exam:  /74   Temp 97.6 °F (36.4 °C)   Ht 5' 4\" (1.626 m)   Wt 120 lb (54.4 kg)   BMI 20.60 kg/m²     GEN: AAOx3, in NAD, well-appearing  HEAD: normocephalic, atraumatic  EYES: no injection or icterus  CVS: RRR  LUNGS: in no acute respiratory distress, CTAB  MSK:  Spine: there is kyphosis of the thoracic spine, C-spine w/ improved flexion/lateral rotation and deviation, forward flexion of the spine w/ fingertips reaching the knees, thoracic and lumbar spine and paraspinal muscles NTTP, SI joints NTTP  Upper extremities:              TGCZS: no active synovitis or dactylitis, joints NTTP, able to make strong full fists              Wrist: no synovitis in the wrist joints b/l, FROM  Lower extremities:              Knees: no warmth or effusion the iliac side of both SI joints w/ serrated appearance of the SI joints. Findings are most c/w seronegative spondyloarthropathy (HLA B27 spondyloarthropathy), correlate clinically. Otherwise normal exam.     MRI C-spine (9/14/15): No signal abnormality within the visualized cord. Slight reversal of the normal cervical lordosis, nonspecific. No prevertebral soft tissue swelling. Vertebral body heights are well-maintained without acute fracture or osseous STIR signal abnormality. Preservation of the disc space heights. No listhesis. No lateralizing disc herniation or significant central canal, lateral recess, or neuroforaminal stenosis. Anterior and posterior longitudinal ligaments are intact. Ligamentum flavum intact. Occipitocervical ligaments intact. No interspinous widening.     MRI C-spine (3/6/19):  1. Extensive edema in the posterior elements of the lower C-spine extending from the level of C4-T1, L>R, as well as in the paraspinal, interspinous, and intercostal soft tissues likely related to active enthesopathy related to the pt's Hx of AS. 2. Mild central spinal canal narrowing at C3-C4, through C6-C7.      MRI T-spine (9/14/15): IMPRESSION:   No cord signal abnormality, marrow edema, lateralizing disc herniation, or significant stenosis.     MRI L-spine (9/14/15): IMPRESSION:   1. No marrow edema, lateralizing disc herniation, or significant stenosis. Mild stenosis, as detailed above. 2. Sclerosis and erosion involving the visualized portion of the SI joints, L greater than R, new from the 2006 MRI, present and better seen on the CT performed August 13, 2014. DDx includes IBD, inflammatory arthropathy (psoriatic arthritis, reactive arthritis), etc.     MRI L-spine (8/14/19, ordered per Pain Management request):  Mild-to-moderate multilevel facet arthropathy, otherwise unremarkable MRI of the lumbar spine. No significant neural foraminal or spinal canal stenosis. CT chest (8/21/20):  1.  No acute Future  -     AST; Future  -     CBC Auto Differential; Future  -     Creatinine, Serum; Future    Crohn's disease without complication, unspecified gastrointestinal tract [facility] ([facility]  -     C-Reactive Protein; Future  -     Sedimentation Rate; Future  -     certolizumab pegol (CIMZIA PREFILLED) 2 X 200 MG/ML KIT injection; Inject 400 mg into the skin every 28 days for 90 doses    Discoid lupus    Vitamin D deficiency  -     Vitamin D 25 Hydroxy; Future          Orders Placed This Encounter   Procedures    ALT     Standing Status:   Future     Standing Expiration Date:   4/1/2021    AST     Standing Status:   Future     Standing Expiration Date:   4/1/2021    CBC Auto Differential     Standing Status:   Future     Standing Expiration Date:   4/1/2021    Creatinine, Serum     Standing Status:   Future     Standing Expiration Date:   4/1/2021    C-Reactive Protein     Standing Status:   Future     Standing Expiration Date:   4/1/2021    Sedimentation Rate     Standing Status:   Future     Standing Expiration Date:   4/1/2021    Vitamin D 25 Hydroxy     Standing Status:   Future     Standing Expiration Date:   4/1/2021     Outpatient Encounter Medications as of 10/1/2020   Medication Sig Dispense Refill    certolizumab pegol (CIMZIA PREFILLED) 2 X 200 MG/ML KIT injection Inject 400 mg into the skin every 28 days for 90 doses 3 kit 0    folic acid (FOLVITE) 1 MG tablet Take 1 tablet by mouth daily 90 tablet 0    Certolizumab Pegol (CIMZIA STARTER KIT) 6 X 200 MG/ML KIT Starting dose: 400 mg subcutaneously x 1 on week 0, 2 and 4 followed by 400 mg subcutaneously every 4 weeks. 3 kit 1    fluocinolone 0.01 % cream Apply topically 2 times daily      hydrocortisone 0.5 % cream Apply topically 2 times daily Apply topically 2 times daily.       amLODIPine (NORVASC) 10 MG tablet Take 10 mg by mouth daily      metoprolol tartrate (LOPRESSOR) 50 MG tablet Take 50 mg by mouth 2 times daily      ferrous sulfate 325 (65 Fe) MG tablet Take 325 mg by mouth daily (with breakfast)      traMADol (ULTRAM) 50 MG tablet tramadol 50 mg tablet   Take 1 tablet 3 times a day by oral route as needed for 30 days.  CALCIUM CITRATE, BARIATRIC ADVANTAGE, 500MG LOZENGE Take 1 lozenge by mouth 2 times daily 270 lozenge 0    Cholecalciferol 31756 units TABS Take 50,000 Units by mouth once a week For 12 weeks then take over-the-counter vitamin D3 2000 units daily. (Patient not taking: Reported on 10/1/2020) 12 tablet 0    mesalamine (APRISO) 0.375 g extended release capsule Take 1.5 g by mouth daily       No facility-administered encounter medications on file as of 10/1/2020. Return in about 2 months (around 12/1/2020) for lab result discussion and treatment plan, medication monitoring. The risks and benefits of my recommendations, as well as other treatment options, benefits and side effects were discussed with the patient today. Questions were answered. NOTE: This report is transcribed by using voice recognition software dragon. Every effort is made to ensure accuracy; however, inadvertent computerized  transcription errors may be present.

## 2020-10-08 NOTE — TELEPHONE ENCOUNTER
Spoke with patient and his mother. Mother is going to take him to have chest xray today or tomorrow. Clarified the reasoning of this several times and to obtain chest xray before he starts his Cimzia injections. I let he and his mother know that medication were sent to the pharmacy. His methotrexate is increased and that he needs to obtain labs in 4 weeks. Stressed the importance of obtaining labs several times. His mother states that she will make sure that he gets these done and she will remind him. He wasn't sure if he was supposed to \"still be taking all these pills,\" so I discussed this with them, that he does need to continue them at this time and they should pick them up at the pharmacy. He wanted them to go to [facility] and not [facility]. Pended for sig to [facility]. normal...

## 2020-12-10 ENCOUNTER — OFFICE VISIT (OUTPATIENT)
Dept: RHEUMATOLOGY | Age: 30
End: 2020-12-10
Payer: COMMERCIAL

## 2020-12-10 VITALS — BODY MASS INDEX: 22.36 KG/M2 | HEIGHT: 64 IN | WEIGHT: 131 LBS | TEMPERATURE: 98 F

## 2020-12-10 PROCEDURE — 99214 OFFICE O/P EST MOD 30 MIN: CPT | Performed by: INTERNAL MEDICINE

## 2020-12-10 PROCEDURE — G8484 FLU IMMUNIZE NO ADMIN: HCPCS | Performed by: INTERNAL MEDICINE

## 2020-12-10 PROCEDURE — G8427 DOCREV CUR MEDS BY ELIG CLIN: HCPCS | Performed by: INTERNAL MEDICINE

## 2020-12-10 PROCEDURE — G8420 CALC BMI NORM PARAMETERS: HCPCS | Performed by: INTERNAL MEDICINE

## 2020-12-10 PROCEDURE — 4004F PT TOBACCO SCREEN RCVD TLK: CPT | Performed by: INTERNAL MEDICINE

## 2020-12-10 RX ORDER — FOLIC ACID 1 MG/1
1 TABLET ORAL DAILY
Qty: 90 TABLET | Refills: 1 | Status: SHIPPED | OUTPATIENT
Start: 2020-12-10 | End: 2021-05-27 | Stop reason: SDUPTHER

## 2020-12-10 NOTE — PROGRESS NOTES
Charles Greco MD  [facility] ([facility]) Physicians - Rheumatology    [] [facility]:  [address], [address] [facility] [x] [place] Office:  [address], [address]   Office: [phone]  Fax: [phone] PROGRESS NOTE    SUBJECTIVE:    Background:   George Serra is a 31 y.o. male w/ AS (+HLA B27, elevated inflammatory markers, chronic inflammatory back pain, MRI evidence of active enthesopathy b/l sacroiliitis w/ peripheral involvement, Hx of plantar fasciitis) in setting of Bx proven CD (follows w/ GI, Dr. Sergio Valdez) and Bx proven discoid lupus (follows w/ Dermatologist, Dr. Kirt Lundborg).  PMHx pertinent for severe vitamin D deficiency, HTN, PTSD, and anxiety.     Current rheum meds:  MTX 6 tablets/wk: started in 6/20, ran out of medicine in [place] [id number] and never got safety labs  Cimzia 400 mg SC x 1 on wk 0, 2, 4 followed by 400 mg SC Q4wk: started in 9/20  Gabapentin 400 mg TID and Hydrocodone-Acetaminophen: managed by Pain Management Enoc Mejias)  Vitamin D3 2000 calcium citrate 1000 mg daily  Clobetasol 0.05% ointment BID (per Dermatology)     Prior rheum meds:  Humira 40 mg SC Q2wk: cleared by Derm and GI, took from 3/15/19 to 11/19, switched to Remicade d/t partial response, elevated CRP  Remicade 8 mg/kg IV Q6wk, received 1st dose on 12/30/19, developed infusion reaction during 5th infusion on 6/16/20  Mesalamine (GI): pt decided to d/c in 11/19  Meloxicam 7.5 mg daily: prescribed by Pain Management, instructed pt to d/c d/t IBD    Past medical/surgical history, medications and allergies are reviewed and updated as appropriate. Interval Hx:   Pt states he has been taking Cimzia injections Q4wk faithfully. Denies any cough, fever/chills or night sweats. He notes significant improvement in his chronic pain and stiffness in his lower back and hand joints. He denies any joint pain or swelling in his hand joints.   Denies any morning stiffness fluocinolone 0.01 % cream Apply topically 2 times daily      hydrocortisone 0.5 % cream Apply topically 2 times daily Apply topically 2 times daily.  amLODIPine (NORVASC) 10 MG tablet Take 10 mg by mouth daily      CALCIUM CITRATE, BARIATRIC ADVANTAGE, 500MG LOZENGE Take 1 lozenge by mouth 2 times daily 270 lozenge 0    metoprolol tartrate (LOPRESSOR) 50 MG tablet Take 50 mg by mouth 2 times daily      ferrous sulfate 325 (65 Fe) MG tablet Take 325 mg by mouth daily (with breakfast)      mesalamine (APRISO) 0.375 g extended release capsule Take 1.5 g by mouth daily       No current facility-administered medications for this visit. OBJECTIVE:  Physical Exam:  Temp 98 °F (36.7 °C)   Ht 5' 4\" (1.626 m)   Wt 131 lb (59.4 kg)   BMI 22.49 kg/m²     GEN: AAOx3, in NAD, well-appearing  HEAD: normocephalic, atraumatic  EYES: no injection or icterus  CVS: RRR  LUNGS: in no acute respiratory distress, CTAB  MSK:  Spine: there is kyphosis of the thoracic spine, C-spine w/ improved flexion/lateral rotation and deviation, forward flexion of the spine w/ fingertips reaching the knees, thoracic and lumbar spine and paraspinal muscles NTTP, SI joints NTTP  Upper extremities:              Hands: no active synovitis or dactylitis, joints NTTP, full fist formation w/ strong  strength              Wrist: no synovitis in the wrist joints b/l, FROM  Lower extremities:              Knees: no warmth or effusion present, FROM              Ankles: no synovitis, FROM              Feet: no toe swelling or pain or warmth on palpation w/ FROM, negative MTP squeeze test  INTEGUMENTARY: hypopigmented scars and macules on face, inner ear, and arms, prior dry and scaly skin resolved, there is diffuse hair thinning, multiple tattoos, no petechiae, bruises, or palpable purpura, no     DATA:  Labs:   I personally reviewed interval labs and discussed w/ the pt in detail which showed:    Lab Results   Component Value Date    WBC 5.3 07/17/2020    HGB 12.3 (L) 07/17/2020    HCT 37.0 (L) 07/17/2020    MCV 85.5 07/17/2020     07/17/2020    LYMPHOPCT 31.4 07/17/2020    RBC 4.32 07/17/2020    MCH 28.4 07/17/2020    MCHC 33.1 07/17/2020    RDW 14.9 07/17/2020     Lab Results   Component Value Date     05/05/2020    K 3.9 05/05/2020     05/05/2020    CO2 25 05/05/2020    BUN 11 05/05/2020    CREATININE 0.7 (L) 07/17/2020    GLUCOSE 80 05/05/2020    CALCIUM 9.0 05/05/2020    PROT 6.5 05/05/2020    LABALBU 3.4 05/05/2020    BILITOT <0.2 05/05/2020    ALKPHOS 86 05/05/2020    AST 12 (L) 07/17/2020    ALT <5 (L) 07/17/2020    LABGLOM >60 07/17/2020    GFRAA >60 07/17/2020    AGRATIO 1.1 05/05/2020    GLOB 3.1 05/05/2020     Interval labs from 1/16/19:  Positive HLA-B27  Negative SSA, SSB  Negative hepatitis B and C serologies, HIV screen, QuantiFERON TB     Negative NENITA (10/9/18, 2/20/19, 7/17/20)  C3 and C4 wnl (2/20/19) --> C3, C4, dsDNA wnl (8/21/19) --> C3 and C4 wnl (7/17/20)  Quantiferon TB indeterminate (7/17/20, 7/23/20)  Vitamin D 25 OH 41.9 (8/21/19)     CRP 9 mg/dL, ESR 63 (10/9/18) --> CRP 82.4 mg/L, ESR 80 (1/16/19) --> CRP 17.2 mg/L, ESR 20 (5/8/19) --> CRP 15.8 mg/L, ESR 18 (8/21/19) --> CRP 33.8 mg/L, ESR 28 (2/26/20) --> ESR 60 (5/5/20) --> CRP 94.9 mg/L (9/17/20)    Imaging:  I personally reviewed interval imaging and discussed w/ the pt in detail which included:    X-ray L-spine (8/6/18): Severe sclerosis of the iliac side of both SI joints w/ serrated appearance of the SI joints. Findings are most c/w seronegative spondyloarthropathy (HLA B27 spondyloarthropathy), correlate clinically. Otherwise normal exam.     MRI C-spine (9/14/15): No signal abnormality within the visualized cord. Slight reversal of the normal cervical lordosis, nonspecific. No prevertebral soft tissue swelling. Vertebral body heights are well-maintained without acute fracture or osseous STIR signal abnormality.  Preservation of the disc space heights. No listhesis. No lateralizing disc herniation or significant central canal, lateral recess, or neuroforaminal stenosis. Anterior and posterior longitudinal ligaments are intact. Ligamentum flavum intact. Occipitocervical ligaments intact. No interspinous widening.     MRI C-spine (3/6/19):  1. Extensive edema in the posterior elements of the lower C-spine extending from the level of C4-T1, L>R, as well as in the paraspinal, interspinous, and intercostal soft tissues likely related to active enthesopathy related to the pt's Hx of AS. 2. Mild central spinal canal narrowing at C3-C4, through C6-C7.      MRI T-spine (9/14/15): IMPRESSION:   No cord signal abnormality, marrow edema, lateralizing disc herniation, or significant stenosis.     MRI L-spine (9/14/15): IMPRESSION:   1. No marrow edema, lateralizing disc herniation, or significant stenosis. Mild stenosis, as detailed above. 2. Sclerosis and erosion involving the visualized portion of the SI joints, L greater than R, new from the 2006 MRI, present and better seen on the CT performed August 13, 2014. DDx includes IBD, inflammatory arthropathy (psoriatic arthritis, reactive arthritis), etc.     MRI L-spine (8/14/19, ordered per Pain Management request):  Mild-to-moderate multilevel facet arthropathy, otherwise unremarkable MRI of the lumbar spine. No significant neural foraminal or spinal canal stenosis. CT chest (8/21/20):  1. No acute abnormality.      Procedures:   Note from GI (Dr. Cici Perez) from 8/17/18:  \"Pt w/ CD, R sided colitis at last c-scope in 2011. Xenia Smoker has been noncompliant with meds and has not followed up with us until recently. .. Restarted Apriso 1 month back\"     C-scope from 8/17/18:  \"There was erosion, friability, granularity, linear ulcer, loss of vascular marking noted in the colon.  There was stigmata bleeding noted.  Cold forceps biopsies were obtained for the purpose of histology\".  Random colon Bx showed \"focal active colitis, negative for dysplasia\". Above results were discussed w/ the pt in detail during today's visit. ASSESSMENT/PLAN:   Significant improvement in ankylosing spondylitis and IBD since starting Cimzia 3 months ago. Pt denies any Sx of active TB on Cimzia. Will cont monthly injections. Discussed indication for MTX for better control of his AS and better efficacy for his Cimzia w/ pt again. Will reach out to GI office to track down safety lab results from a month ago then resume MTX titrated up to 5 tablets/wk, sent folic acid 1 mg daily x 90 day refill. Discussed s/e and risks of MTX, pt states he does not drink alcohol on a regular basis and his significant other is s/p tubal ligation. Ordered safety labs for MTX to be drawn 4 wk after resuming MTX followed by Q3mo. Prasanth Marks was seen today for follow-up. Diagnoses and all orders for this visit:    Ankylosing spondylitis of multiple sites in spine ([facility] [address]  -     folic acid (FOLVITE) 1 MG tablet; Take 1 tablet by mouth daily  -     certolizumab pegol (CIMZIA PREFILLED) 2 X 200 MG/ML KIT injection; Inject 400 mg into the skin every 28 days for 90 doses    Crohn's disease without complication, unspecified gastrointestinal tract location (HCC)  -     certolizumab pegol (CIMZIA PREFILLED) 2 X 200 MG/ML KIT injection; Inject 400 mg into the skin every 28 days for 90 doses    Discoid lupus    High risk medication use  -     ALT; Future  -     AST; Future  -     CBC Auto Differential; Future  -     Creatinine, Serum; Future  -     ALT; Future  -     AST; Future  -     CBC Auto Differential; Future  -     Creatinine, Serum; Future    Encounter for therapeutic drug monitoring  -     ALT; Future  -     AST; Future  -     CBC Auto Differential; Future  -     Creatinine, Serum; Future  -     ALT; Future  -     AST; Future  -     CBC Auto Differential; Future  -     Creatinine, Serum;  Future          Orders Placed This Encounter   Procedures  ALT     Standing Status:   Future     Standing Expiration Date:   6/10/2021    AST     Standing Status:   Future     Standing Expiration Date:   6/10/2021    CBC Auto Differential     Standing Status:   Future     Standing Expiration Date:   6/10/2021    Creatinine, Serum     Standing Status:   Future     Standing Expiration Date:   6/10/2021    ALT     Standing Status:   Future     Standing Expiration Date:   6/10/2021    AST     Standing Status:   Future     Standing Expiration Date:   6/10/2021    CBC Auto Differential     Standing Status:   Future     Standing Expiration Date:   6/10/2021    Creatinine, Serum     Standing Status:   Future     Standing Expiration Date:   6/10/2021     Outpatient Encounter Medications as of 12/10/2020   Medication Sig Dispense Refill    folic acid (FOLVITE) 1 MG tablet Take 1 tablet by mouth daily 90 tablet 1    certolizumab pegol (CIMZIA PREFILLED) 2 X 200 MG/ML KIT injection Inject 400 mg into the skin every 28 days for 90 doses 3 kit 1    Cholecalciferol 50815 units TABS Take 50,000 Units by mouth once a week For 12 weeks then take over-the-counter vitamin D3 2000 units daily. 12 tablet 0    fluocinolone 0.01 % cream Apply topically 2 times daily      hydrocortisone 0.5 % cream Apply topically 2 times daily Apply topically 2 times daily.  amLODIPine (NORVASC) 10 MG tablet Take 10 mg by mouth daily      [DISCONTINUED] certolizumab pegol (CIMZIA PREFILLED) 2 X 200 MG/ML KIT injection Inject 400 mg into the skin every 28 days for 90 doses 3 kit 0    [DISCONTINUED] folic acid (FOLVITE) 1 MG tablet Take 1 tablet by mouth daily (Patient not taking: Reported on 12/10/2020) 90 tablet 0    [DISCONTINUED] Certolizumab Pegol (CIMZIA STARTER KIT) 6 X 200 MG/ML KIT Starting dose: 400 mg subcutaneously x 1 on week 0, 2 and 4 followed by 400 mg subcutaneously every 4 weeks.  (Patient not taking: Reported on 12/10/2020) 3 kit 1    CALCIUM CITRATE, BARIATRIC ADVANTAGE, 500MG LOZENGE Take 1 lozenge by mouth 2 times daily 270 lozenge 0    metoprolol tartrate (LOPRESSOR) 50 MG tablet Take 50 mg by mouth 2 times daily      ferrous sulfate 325 (65 Fe) MG tablet Take 325 mg by mouth daily (with breakfast)      mesalamine (APRISO) 0.375 g extended release capsule Take 1.5 g by mouth daily       No facility-administered encounter medications on file as of 12/10/2020. Return in about 5 years (around 12/10/2025) for lab result discussion and treatment plan, medication monitoring. The risks and benefits of my recommendations, as well as other treatment options, benefits and side effects were discussed with the patient today. Questions were answered. NOTE: This report is transcribed by using voice recognition software dragon. Every effort is made to ensure accuracy; however, inadvertent computerized  transcription errors may be present.

## 2020-12-10 NOTE — PATIENT INSTRUCTIONS
Once we get your blood work results from Dr. Genie Lackey office, we will send you a 90 day supply on Methotrexate 4 tablets weekly. Resume taking folic acid 1 mg daily (this is to prevent you from developing side effects on Methotrexate). Get labs the first week of March and call Dr. Nehemias Kuhn office for Methotrexate refills in March. Continue taking Cimzia injections every month.

## 2020-12-10 NOTE — LETTER
[facility] Rheumatology  [address] [id number]  Phone: [phone]  Fax: [phone]      December 10, 2020     Rashel Wyman  [address]  [address]      Dear Kian Hinkle:    I am writing you because I have been informed of your missed appointment(s). We ask that you please call 24 hours in advance if you are unable to make your appointment so that we can give that time to another patient in need. We care about you and the management of your healthcare and want to make sure that you follow up as recommended. I have to also mention, that in an effort to provide quality care and timely appointments to all my patients, it is our policy that patients be discharged from the practice if they do not show for three scheduled appointments. You would be informed of this termination by mail, and would have 30 days from the date of discharge to locate another physician. We're sorry you were unable to keep your appointment and hope that you are doing well. We would like to continue treating your healthcare needs. Please call the office to let us know your plans for treatment and to reschedule your appointment.      Sincerely,        Mariely Norris MD

## 2020-12-17 ENCOUNTER — TELEPHONE (OUTPATIENT)
Dept: RHEUMATOLOGY | Age: 30
End: 2020-12-17

## 2020-12-17 NOTE — TELEPHONE ENCOUNTER
Please let pt know that I received his labs from his GI doctor's office. I called sent MTX refills to his [address]. He should take 5 tablets of MTX every week with folic acid every day of the week except for the day of his MTX dose. Please remind him to get safety labs in 4 weeks then 3 months later for MTX. Please make sure he's taking a daily vitamin D3 2000 IU w/ calcium.

## 2020-12-28 NOTE — TELEPHONE ENCOUNTER
Patient reports the mtx should have gone to [facility] on [address]. Please call patient once this has been sent.

## 2021-02-12 ENCOUNTER — TELEPHONE (OUTPATIENT)
Dept: INTERNAL MEDICINE CLINIC | Age: 31
End: 2021-02-12

## 2021-02-12 NOTE — TELEPHONE ENCOUNTER
Patient states pain physician scheduled him for an epidural steroid injection. He wanted to ask if it is okay for him to have the injection.

## 2021-03-10 ENCOUNTER — TELEPHONE (OUTPATIENT)
Dept: INTERNAL MEDICINE CLINIC | Age: 31
End: 2021-03-10

## 2021-03-10 DIAGNOSIS — M45.0 ANKYLOSING SPONDYLITIS OF MULTIPLE SITES IN SPINE (HCC): ICD-10-CM

## 2021-03-10 DIAGNOSIS — L93.0 DISCOID LUPUS: ICD-10-CM

## 2021-03-10 NOTE — TELEPHONE ENCOUNTER
Last seen 12/10/21  Last labs 1/12/21  Next visit 5/20/21    I don't see in Dr Eliane Ramirez notes that patient is on HCQ. Called spoke with patient-patient seemed confused on what he was supposed to take. States he never got the MTX and has not started the medication. I explained to him the medication was sent in on 12/29/20 to his  [facility] in [place] [address] and that if he had trouble getting the medication he needs to call us right away. Patient was supposed to be taking this medication. States he has been taking the folic acid. So I explained to him again that the refill will be sent in again and if he has trouble picking up the medication to call us that day. Then after he is taking the medication for 4 weeks he needs labs done. Patient states understanding.

## 2021-03-10 NOTE — TELEPHONE ENCOUNTER
Patient of Dr Angelica Yañez is requesting refills of hydroxychloroquine (PLAQUENIL) 200 MG tablet & methotrexate (RHEUMATREX) 2.5 MG chemo tablet sent to [address] [place] [address] [facility] in [place].

## 2021-05-18 DIAGNOSIS — K50.90 CROHN'S DISEASE WITHOUT COMPLICATION, UNSPECIFIED GASTROINTESTINAL TRACT LOCATION (HCC): ICD-10-CM

## 2021-05-18 DIAGNOSIS — M45.0 ANKYLOSING SPONDYLITIS OF MULTIPLE SITES IN SPINE (HCC): ICD-10-CM

## 2021-05-21 ENCOUNTER — TELEPHONE (OUTPATIENT)
Dept: RHEUMATOLOGY | Age: 31
End: 2021-05-21

## 2021-05-26 ENCOUNTER — TELEPHONE (OUTPATIENT)
Dept: INTERNAL MEDICINE CLINIC | Age: 31
End: 2021-05-26

## 2021-05-27 ENCOUNTER — OFFICE VISIT (OUTPATIENT)
Dept: RHEUMATOLOGY | Age: 31
End: 2021-05-27
Payer: COMMERCIAL

## 2021-05-27 VITALS
DIASTOLIC BLOOD PRESSURE: 102 MMHG | BODY MASS INDEX: 21.51 KG/M2 | SYSTOLIC BLOOD PRESSURE: 144 MMHG | WEIGHT: 126 LBS | HEIGHT: 64 IN

## 2021-05-27 DIAGNOSIS — Z79.899 HIGH RISK MEDICATION USE: ICD-10-CM

## 2021-05-27 DIAGNOSIS — M45.0 ANKYLOSING SPONDYLITIS OF MULTIPLE SITES IN SPINE (HCC): Primary | ICD-10-CM

## 2021-05-27 DIAGNOSIS — K50.90 CROHN'S DISEASE WITHOUT COMPLICATION, UNSPECIFIED GASTROINTESTINAL TRACT LOCATION (HCC): ICD-10-CM

## 2021-05-27 DIAGNOSIS — Z51.81 ENCOUNTER FOR THERAPEUTIC DRUG MONITORING: ICD-10-CM

## 2021-05-27 DIAGNOSIS — M45.0 ANKYLOSING SPONDYLITIS OF MULTIPLE SITES IN SPINE (HCC): ICD-10-CM

## 2021-05-27 DIAGNOSIS — L93.0 DISCOID LUPUS: ICD-10-CM

## 2021-05-27 LAB
ALT SERPL-CCNC: 11 U/L (ref 10–40)
AST SERPL-CCNC: 22 U/L (ref 15–37)
BASOPHILS ABSOLUTE: 0 K/UL (ref 0–0.2)
BASOPHILS RELATIVE PERCENT: 0.2 %
C-REACTIVE PROTEIN: 24.4 MG/L (ref 0–5.1)
CREAT SERPL-MCNC: 0.8 MG/DL (ref 0.9–1.3)
EOSINOPHILS ABSOLUTE: 0.1 K/UL (ref 0–0.6)
EOSINOPHILS RELATIVE PERCENT: 1.4 %
GFR AFRICAN AMERICAN: >60
GFR NON-AFRICAN AMERICAN: >60
HCT VFR BLD CALC: 41 % (ref 40.5–52.5)
HEMOGLOBIN: 13.9 G/DL (ref 13.5–17.5)
LYMPHOCYTES ABSOLUTE: 2.5 K/UL (ref 1–5.1)
LYMPHOCYTES RELATIVE PERCENT: 46.1 %
MCH RBC QN AUTO: 29.9 PG (ref 26–34)
MCHC RBC AUTO-ENTMCNC: 34 G/DL (ref 31–36)
MCV RBC AUTO: 87.8 FL (ref 80–100)
MONOCYTES ABSOLUTE: 0.4 K/UL (ref 0–1.3)
MONOCYTES RELATIVE PERCENT: 6.5 %
NEUTROPHILS ABSOLUTE: 2.5 K/UL (ref 1.7–7.7)
NEUTROPHILS RELATIVE PERCENT: 45.8 %
PDW BLD-RTO: 13.8 % (ref 12.4–15.4)
PLATELET # BLD: 240 K/UL (ref 135–450)
PMV BLD AUTO: 8.2 FL (ref 5–10.5)
RBC # BLD: 4.67 M/UL (ref 4.2–5.9)
WBC # BLD: 5.5 K/UL (ref 4–11)

## 2021-05-27 PROCEDURE — G8428 CUR MEDS NOT DOCUMENT: HCPCS | Performed by: INTERNAL MEDICINE

## 2021-05-27 PROCEDURE — G8420 CALC BMI NORM PARAMETERS: HCPCS | Performed by: INTERNAL MEDICINE

## 2021-05-27 PROCEDURE — 4004F PT TOBACCO SCREEN RCVD TLK: CPT | Performed by: INTERNAL MEDICINE

## 2021-05-27 PROCEDURE — 99214 OFFICE O/P EST MOD 30 MIN: CPT | Performed by: INTERNAL MEDICINE

## 2021-05-27 RX ORDER — FOLIC ACID 1 MG/1
1 TABLET ORAL DAILY
Qty: 90 TABLET | Refills: 0 | Status: SHIPPED | OUTPATIENT
Start: 2021-05-27 | End: 2021-08-26 | Stop reason: SDUPTHER

## 2021-05-27 RX ORDER — GABAPENTIN 300 MG/1
300 CAPSULE ORAL 3 TIMES DAILY
COMMUNITY

## 2021-05-27 NOTE — PROGRESS NOTES
any Sx of uveitis. He tells me his IBD remains well controlled on Cimzia and he only gets non-bloody diarrhea \"depends on what I eat\". He has not gotten the COVID-19 vaccine yet. He tells me he has one alcoholic drink per month. Constitutional: denies fever/chills, night sweats, unintentional weight loss  Integumentary: +photosensitivity w/ chronic discoid lupus no new lesions and chronic diffuse alopecia, denies Sx of Raynaud's phenomenon  Eyes: denies dry eyes, redness or pain  Oral cavity: denies recent oral ulcers, dry mouth or thrush  Cardiovascular: denies CP, palpitations, Hx of pericardial effusion or pericarditis  Respiratory: denies SOB, cough, hemoptysis, or pleurisy  Gastrointestinal: IBD Sx as above HPI  Musculoskeletal:  refer to above HPI     No Known Allergies    Past Medical History:    No past medical history on file. Past Surgical History:    No past surgical history on file. Medications:    Current Outpatient Medications   Medication Sig Dispense Refill    gabapentin (NEURONTIN) 300 MG capsule gabapentin 300 mg capsule      certolizumab pegol (CIMZIA PREFILLED) 2 X 200 MG/ML KIT injection Inject 400 mg into the skin every 28 days 9602 mg 0    folic acid (FOLVITE) 1 MG tablet Take 1 tablet by mouth daily 90 tablet 0    methotrexate (RHEUMATREX) 2.5 MG chemo tablet Take 5 tablets by mouth once a week 60 tablet 0    fluocinolone 0.01 % cream Apply topically 2 times daily      hydrocortisone 0.5 % cream Apply topically 2 times daily Apply topically 2 times daily.  amLODIPine (NORVASC) 10 MG tablet Take 10 mg by mouth daily      CALCIUM CITRATE, BARIATRIC ADVANTAGE, 500MG LOZENGE Take 1 lozenge by mouth 2 times daily 270 lozenge 0    Cholecalciferol 01916 units TABS Take 50,000 Units by mouth once a week For 12 weeks then take over-the-counter vitamin D3 2000 units daily.  (Patient not taking: Reported on 5/27/2021) 12 tablet 0    metoprolol tartrate (LOPRESSOR) 50 MG tablet Take 50 mg by mouth 2 times daily (Patient not taking: Reported on 5/27/2021)      ferrous sulfate 325 (65 Fe) MG tablet Take 325 mg by mouth daily (with breakfast) (Patient not taking: Reported on 5/27/2021)      mesalamine (APRISO) 0.375 g extended release capsule Take 1.5 g by mouth daily  (Patient not taking: Reported on 5/27/2021)       No current facility-administered medications for this visit. OBJECTIVE:  Physical Exam:  BP (!) 144/102   Ht 5' 4\" (1.626 m)   Wt 126 lb (57.2 kg)   BMI 21.63 kg/m²     GEN: AAOx3, in NAD, well-appearing  HEAD: normocephalic, atraumatic  EYES: no injection or icterus  CVS: RRR  LUNGS: in no acute respiratory distress, CTAB  MSK:  Upper extremities:              Hands: no active synovitis or dactylitis, joints NTTP, full fist formation w/ strong  strength              Wrist: no synovitis in the wrist joints b/l, FROM  Lower extremities:              Knees: no warmth or effusion present, FROM              Ankles: no synovitis, FROM              Feet: no toe swelling or pain or warmth on palpation w/ FROM, negative MTP squeeze test  INTEGUMENTARY: hypopigmented scars and macules on face, inner ear, and arms, prior dry and scaly skin resolved, there is diffuse hair thinning, multiple tattoos, no petechiae, bruises, or palpable purpura, no     DATA:  Labs:   I personally reviewed interval labs and discussed w/ the pt in detail which showed:    Lab Results   Component Value Date    WBC 5.3 07/17/2020    HGB 12.3 (L) 07/17/2020    HCT 37.0 (L) 07/17/2020    MCV 85.5 01/12/2021     07/17/2020    LYMPHOPCT 49.6 01/12/2021    RBC 4.72 01/12/2021    MCH 28.8 01/12/2021    MCHC 33.8 01/12/2021    RDW 13.8 01/12/2021     Lab Results   Component Value Date     05/05/2020    K 3.9 05/05/2020     05/05/2020    CO2 25 05/05/2020    BUN 11 05/05/2020    CREATININE 0.7 (L) 07/17/2020    GLUCOSE 80 05/05/2020    CALCIUM 9.0 05/05/2020    PROT 6.5 05/05/2020 narrowing at C3-C4, through C6-C7.      MRI T-spine (9/14/15): IMPRESSION:   No cord signal abnormality, marrow edema, lateralizing disc herniation, or significant stenosis.     MRI L-spine (9/14/15): IMPRESSION:   1. No marrow edema, lateralizing disc herniation, or significant stenosis. Mild stenosis, as detailed above. 2. Sclerosis and erosion involving the visualized portion of the SI joints, L greater than R, new from the 2006 MRI, present and better seen on the CT performed August 13, 2014. DDx includes IBD, inflammatory arthropathy (psoriatic arthritis, reactive arthritis), etc.     MRI L-spine (8/14/19, ordered per Pain Management request):  Mild-to-moderate multilevel facet arthropathy, otherwise unremarkable MRI of the lumbar spine. No significant neural foraminal or spinal canal stenosis. CT chest (8/21/20):  1. No acute abnormality.      Procedures:   Note from GI (Dr. Florentin Akins) from 8/17/18:  \"Pt w/ CD, R sided colitis at last c-scope in 2011. Aisha Dorado has been noncompliant with meds and has not followed up with us until recently. .. Restarted Apriso 1 month back\"     C-scope from 8/17/18:  \"There was erosion, friability, granularity, linear ulcer, loss of vascular marking noted in the colon.  There was stigmata bleeding noted.  Cold forceps biopsies were obtained for the purpose of histology\".  Random colon Bx showed \"focal active colitis, negative for dysplasia\". Above results were discussed w/ the pt in detail during today's visit. ASSESSMENT/PLAN:   AS and IBD remain well controlled on Cimzia and MTX. No evidence of active synovitis or enthesitis in the peripheral joints on exam today. Discoid lupus stable. So far no evidence of SLE. Discussed importance of safety labs on MTX, he will obtain labs today. Recommended the COVID-19 vaccine and provided instructions on holding his immunosuppression during his vaccine. Addis Kristine was seen today for follow-up.     Diagnoses and all orders for this visit:    Ankylosing spondylitis of multiple sites in spine ([facility]  -     Discontinue: certolizumab pegol (CIMZIA PREFILLED) 2 X 200 MG/ML KIT injection; Inject 400 mg into the skin every 28 days for 90 doses  -     certolizumab pegol (CIMZIA PREFILLED) 2 X 200 MG/ML KIT injection; Inject 400 mg into the skin every 28 days  -     folic acid (FOLVITE) 1 MG tablet; Take 1 tablet by mouth daily  -     methotrexate (RHEUMATREX) 2.5 MG chemo tablet; Take 5 tablets by mouth once a week  -     C-Reactive Protein; Future    Crohn's disease without complication, unspecified gastrointestinal tract location ([facility]  -     Discontinue: certolizumab pegol (CIMZIA PREFILLED) 2 X 200 MG/ML KIT injection; Inject 400 mg into the skin every 28 days for 90 doses  -     certolizumab pegol (CIMZIA PREFILLED) 2 X 200 MG/ML KIT injection; Inject 400 mg into the skin every 28 days  -     C-Reactive Protein; Future    Discoid lupus  -     methotrexate (RHEUMATREX) 2.5 MG chemo tablet; Take 5 tablets by mouth once a week    High risk medication use  -     ALT; Future  -     AST; Future  -     CBC Auto Differential; Future  -     Creatinine, Serum; Future    Encounter for therapeutic drug monitoring  -     ALT; Future  -     AST; Future  -     CBC Auto Differential; Future  -     Creatinine, Serum;  Future          Orders Placed This Encounter   Procedures    ALT     Standing Status:   Future     Standing Expiration Date:   11/27/2021    AST     Standing Status:   Future     Standing Expiration Date:   11/27/2021    CBC Auto Differential     Standing Status:   Future     Standing Expiration Date:   11/27/2021    Creatinine, Serum     Standing Status:   Future     Standing Expiration Date:   11/27/2021    C-Reactive Protein     Standing Status:   Future     Standing Expiration Date:   11/27/2021     Outpatient Encounter Medications as of 5/27/2021   Medication Sig Dispense Refill    gabapentin (NEURONTIN) 300 MG capsule

## 2021-05-28 DIAGNOSIS — Z79.899 HIGH RISK MEDICATION USE: ICD-10-CM

## 2021-05-28 DIAGNOSIS — L93.0 DISCOID LUPUS: ICD-10-CM

## 2021-05-28 DIAGNOSIS — M45.0 ANKYLOSING SPONDYLITIS OF MULTIPLE SITES IN SPINE (HCC): Primary | ICD-10-CM

## 2021-05-28 RX ORDER — METHOTREXATE 25 MG/ML
20 INJECTION, SOLUTION INTRA-ARTERIAL; INTRAMUSCULAR; INTRAVENOUS WEEKLY
Qty: 3.2 ML | Refills: 1 | Status: SHIPPED | OUTPATIENT
Start: 2021-05-28 | End: 2021-08-09

## 2021-05-28 RX ORDER — SYRINGE-NEEDLE,INSULIN,0.5 ML 28GX1/2"
1 SYRINGE, EMPTY DISPOSABLE MISCELLANEOUS WEEKLY
Qty: 56 EACH | Refills: 2 | Status: SHIPPED | OUTPATIENT
Start: 2021-05-28 | End: 2021-07-01 | Stop reason: SDUPTHER

## 2021-06-02 ENCOUNTER — TELEPHONE (OUTPATIENT)
Dept: INTERNAL MEDICINE CLINIC | Age: 31
End: 2021-06-02

## 2021-06-02 NOTE — TELEPHONE ENCOUNTER
Called mom's phone no answer. I called patient and spoke with him on how to give his self the MTX injection. He is to inject 0.8mls into skin once a week. Patient wanted to know if ok to give in his stomach or top of his legs. Yes that's fine.

## 2021-06-02 NOTE — TELEPHONE ENCOUNTER
Patient's mother calling back. Advised her she would get a call back. She said if she doesn't answer to try patient at [phone].

## 2021-07-01 ENCOUNTER — TELEPHONE (OUTPATIENT)
Dept: INTERNAL MEDICINE CLINIC | Age: 31
End: 2021-07-01

## 2021-07-01 DIAGNOSIS — L93.0 DISCOID LUPUS: ICD-10-CM

## 2021-07-01 DIAGNOSIS — M45.0 ANKYLOSING SPONDYLITIS OF MULTIPLE SITES IN SPINE (HCC): ICD-10-CM

## 2021-07-01 RX ORDER — SYRINGE-NEEDLE,INSULIN,0.5 ML 28GX1/2"
1 SYRINGE, EMPTY DISPOSABLE MISCELLANEOUS WEEKLY
Qty: 56 EACH | Refills: 5 | Status: SHIPPED | OUTPATIENT
Start: 2021-07-01 | End: 2021-08-26

## 2021-08-06 DIAGNOSIS — L93.0 DISCOID LUPUS: ICD-10-CM

## 2021-08-06 DIAGNOSIS — M45.0 ANKYLOSING SPONDYLITIS OF MULTIPLE SITES IN SPINE (HCC): ICD-10-CM

## 2021-08-10 RX ORDER — METHOTREXATE 25 MG/ML
INJECTION, SOLUTION INTRA-ARTERIAL; INTRAMUSCULAR; INTRAVENOUS
Qty: 3.2 ML | Refills: 0 | Status: SHIPPED | OUTPATIENT
Start: 2021-08-10 | End: 2021-08-26 | Stop reason: SDUPTHER

## 2021-08-26 ENCOUNTER — OFFICE VISIT (OUTPATIENT)
Dept: RHEUMATOLOGY | Age: 31
End: 2021-08-26
Payer: COMMERCIAL

## 2021-08-26 VITALS
HEIGHT: 64 IN | BODY MASS INDEX: 21.68 KG/M2 | WEIGHT: 127 LBS | SYSTOLIC BLOOD PRESSURE: 112 MMHG | DIASTOLIC BLOOD PRESSURE: 80 MMHG

## 2021-08-26 DIAGNOSIS — L93.0 DISCOID LUPUS: ICD-10-CM

## 2021-08-26 DIAGNOSIS — Z51.81 ENCOUNTER FOR THERAPEUTIC DRUG MONITORING: ICD-10-CM

## 2021-08-26 DIAGNOSIS — K50.90 CROHN'S DISEASE WITHOUT COMPLICATION, UNSPECIFIED GASTROINTESTINAL TRACT LOCATION (HCC): ICD-10-CM

## 2021-08-26 DIAGNOSIS — Z79.899 HIGH RISK MEDICATION USE: ICD-10-CM

## 2021-08-26 DIAGNOSIS — M45.0 ANKYLOSING SPONDYLITIS OF MULTIPLE SITES IN SPINE (HCC): Primary | ICD-10-CM

## 2021-08-26 PROCEDURE — G8420 CALC BMI NORM PARAMETERS: HCPCS | Performed by: INTERNAL MEDICINE

## 2021-08-26 PROCEDURE — 4004F PT TOBACCO SCREEN RCVD TLK: CPT | Performed by: INTERNAL MEDICINE

## 2021-08-26 PROCEDURE — G8427 DOCREV CUR MEDS BY ELIG CLIN: HCPCS | Performed by: INTERNAL MEDICINE

## 2021-08-26 PROCEDURE — 99215 OFFICE O/P EST HI 40 MIN: CPT | Performed by: INTERNAL MEDICINE

## 2021-08-26 RX ORDER — SYRINGE-NEEDLE,INSULIN,0.5 ML 28GX1/2"
1 SYRINGE, EMPTY DISPOSABLE MISCELLANEOUS
Qty: 12 EACH | Refills: 3 | Status: SHIPPED | OUTPATIENT
Start: 2021-08-26 | End: 2021-10-28 | Stop reason: SDUPTHER

## 2021-08-26 RX ORDER — METHOTREXATE 25 MG/ML
20 INJECTION, SOLUTION INTRA-ARTERIAL; INTRAMUSCULAR; INTRAVENOUS WEEKLY
Qty: 9.6 ML | Refills: 0 | Status: SHIPPED | OUTPATIENT
Start: 2021-08-26 | End: 2021-10-28 | Stop reason: SDUPTHER

## 2021-08-26 RX ORDER — FOLIC ACID 1 MG/1
1 TABLET ORAL DAILY
Qty: 90 TABLET | Refills: 0 | Status: SHIPPED | OUTPATIENT
Start: 2021-08-26 | End: 2021-10-28 | Stop reason: SDUPTHER

## 2021-08-26 NOTE — PROGRESS NOTES
Orville Hardin MD  [facility]) Physicians - Rheumatology    [] [facility] HOSPITAL:  [address] [facility] [address] [facility] [x] [facility] Office:  [address] [facility]   Office: [phone]  Fax: [phone] PROGRESS NOTE    SUBJECTIVE:    Background:   Mandi Juarez is a 27 y.o. male w/ AS (+HLA B27, elevated inflammatory markers, chronic inflammatory back pain, MRI evidence of active enthesopathy b/l sacroiliitis w/ peripheral involvement, Hx of plantar fasciitis) in setting of Bx proven CD (follows w/ GI, Dr. Kassy Cortez) and Bx proven discoid lupus (follows w/ Dermatologist, Dr. Lexis Tavares). PMHx pertinent for severe vitamin D deficiency, HTN, PTSD, and anxiety.     Current rheum meds:  MTX 20 mg/wk: started in 6/2020  Cimzia 400 mg SC Q4wk: started in 9/2020  Gabapentin 400 mg TID and Hydrocodone-Acetaminophen: managed by Pain Management Merrill Lie)  Vitamin D3 2000 calcium citrate 1000 mg daily  Clobetasol 0.05% ointment BID (per Dermatology)     Prior rheum meds:  Humira 40 mg SC Q2wk: cleared by Derm and GI, took from 3/15/19 to 11/19, switched to Remicade d/t partial response, elevated CRP  Remicade 8 mg/kg IV Q6wk, received 1st dose on 12/30/19, developed infusion reaction during 5th infusion on 6/16/20  Mesalamine (GI): pt decided to d/c in 11/19  Meloxicam 7.5 mg daily: prescribed by Pain Management, instructed pt to d/c d/t IBD    Past medical/surgical history, medications and allergies are reviewed and updated as appropriate. Interval Hx:   Pt presents to the office w/ his mother today. They have numerous questions regarding his autoimmune diseases and the purpose of his medications. Pt states he switched oral MTX to SC injection in May. He states he's doing 0.8 mL wkly injections. He remains on Cimzia injections. He tells me he's been having difficulty w/ these injections which have been causing bleeding.  He feels Cimzia starts to wear off 2 wks after each injection, he reports worsening low back stiffness. He denies any peripheral joint involvement. He reports a metallic taste in his mouth. He tells me his recently saw his GI, last wk, who stated his IBD is well controlled on Cimzia. He denies any active Sx of IBD. Discoid lupus is stable, denies any lesions. Denies any new rash, oral or nasal ulcers. Denies any Sx of uveitis. Pt states he recently fell down 7 steps and fractured his L wrist bone. He is currently wearing a cast and has short-term f/u w/ Ortho. Constitutional: denies fever/chills, night sweats, unintentional weight loss  Integumentary: +photosensitivity w/ chronic discoid lupus no new lesions and chronic diffuse alopecia, denies Sx of Raynaud's phenomenon  Eyes: denies dry eyes, redness or pain  Oral cavity: denies recent oral ulcers, dry mouth or thrush  Cardiovascular: denies CP, palpitations, Hx of pericardial effusion or pericarditis  Respiratory: denies SOB, cough, hemoptysis, or pleurisy  Gastrointestinal: IBD Sx as above HPI  Musculoskeletal:  refer to above HPI     No Known Allergies    Past Medical History:    No past medical history on file. Past Surgical History:    No past surgical history on file. Medications:    Current Outpatient Medications   Medication Sig Dispense Refill    methotrexate Sodium (RHEUMATREX) 50 MG/2ML chemo injection INJECT 0.8MLS INTO THE SKIN ONCE A WEEK 3.2 mL 0    TUBERCULIN SYR 1CC/25GX5/8\" 25G X 5/8\" 1 ML MISC 1 box by Does not apply route once a week 56 each 5    gabapentin (NEURONTIN) 300 MG capsule Take 300 mg by mouth 3 times daily.        certolizumab pegol (CIMZIA PREFILLED) 2 X 200 MG/ML KIT injection Inject 400 mg into the skin every 28 days 8085 mg 0    folic acid (FOLVITE) 1 MG tablet Take 1 tablet by mouth daily 90 tablet 0    Cholecalciferol 61363 units TABS Take 50,000 Units by mouth once a week For 12 weeks then take over-the-counter vitamin D3 2000 units daily. 12 tablet 0    fluocinolone 0.01 % cream Apply topically 2 times daily      hydrocortisone 0.5 % cream Apply topically 2 times daily Apply topically 2 times daily.  amLODIPine (NORVASC) 10 MG tablet Take 10 mg by mouth daily      ferrous sulfate 325 (65 Fe) MG tablet Take 325 mg by mouth daily (with breakfast)       CALCIUM CITRATE, BARIATRIC ADVANTAGE, 500MG LOZENGE Take 1 lozenge by mouth 2 times daily 270 lozenge 0    metoprolol tartrate (LOPRESSOR) 50 MG tablet Take 50 mg by mouth 2 times daily (Patient not taking: Reported on 5/27/2021)      mesalamine (APRISO) 0.375 g extended release capsule Take 1.5 g by mouth daily  (Patient not taking: Reported on 5/27/2021)       No current facility-administered medications for this visit. OBJECTIVE:  Physical Exam:  /80   Ht 5' 4\" (1.626 m)   Wt 127 lb (57.6 kg)   BMI 21.80 kg/m²     GEN: AAOx3, in NAD, well-appearing, accompanied by mother  HEAD: normocephalic, atraumatic  EYES: no injection or icterus  CVS: RRR  LUNGS: in no acute respiratory distress, CTAB  MSK:  Upper extremities:              Hands: no active synovitis or dactylitis, joints NTTP, full fist formation w/ strong  strength              Wrist: no synovitis in the wrist joints b/l, FROM  Lower extremities:              Knees: no warmth or effusion present, FROM              Ankles: no synovitis, FROM              Feet: no toe swelling or pain or warmth on palpation w/ FROM, negative MTP squeeze test  INTEGUMENT: hypopigmented scars and macules on face, inner ear, and arms, prior dry and scaly skin resolved, there is diffuse hair thinning, multiple tattoos, no petechiae, bruises, or palpable purpura    DATA:  Labs:   I personally reviewed interval labs and discussed w/ the pt in detail which showed:    Lab Results   Component Value Date    WBC 5.5 05/27/2021    HGB 13.9 05/27/2021    HCT 41.0 05/27/2021    MCV 87.8 05/27/2021     05/27/2021    LYMPHOPCT 46.1 05/27/2021    RBC 4.67 05/27/2021    MCH 29.9 05/27/2021    MCHC 34.0 05/27/2021    RDW 13.8 05/27/2021       Chemistry        Component Value Date/Time     05/05/2020 1137    K 3.9 05/05/2020 1137     05/05/2020 1137    CO2 25 05/05/2020 1137    BUN 11 05/05/2020 1137    CREATININE 0.8 (L) 05/27/2021 1450        Component Value Date/Time    CALCIUM 9.0 05/05/2020 1137    ALKPHOS 86 05/05/2020 1137    AST 22 05/27/2021 1450    AST 14 (A) 01/12/2021 1809    ALT 11 05/27/2021 1450    BILITOT <0.2 05/05/2020 1137        Lab Results   Component Value Date    VITD25 41.9 08/21/2019     Lab Results   Component Value Date    C3 158.1 07/17/2020    C3 138.4 08/21/2019    C3 150.2 02/20/2019     Lab Results   Component Value Date    C4 31.4 07/17/2020    C4 26.9 08/21/2019    C4 33.4 02/20/2019     Lab Results   Component Value Date    ANTIDSDNAIGG 1 08/21/2019      Lab Results   Component Value Date    CRP 24.4 (H) 05/27/2021    CRP 94.9 (H) 07/17/2020    CRP 33.8 (H) 02/26/2020     Lab Results   Component Value Date    SEDRATE 60 (H) 05/05/2020    SEDRATE 28 (H) 02/26/2020    SEDRATE 18 (H) 08/21/2019     Positive HLA-B27 (1/16/19)  Negative NENITA (10/9/18, 2/20/19, 7/17/20)  Negative SSA, SSB (1/16/19)  Negative hepatitis B and C serologies, HIV screen, QuantiFERON TB (1/16/19)  Quantiferon TB indeterminate (7/17/20, 7/23/20)    Imaging:  I personally reviewed interval imaging and discussed w/ the pt in detail which included:    X-ray L-spine (8/6/18): Severe sclerosis of the iliac side of both SI joints w/ serrated appearance of the SI joints. Findings are most c/w seronegative spondyloarthropathy (HLA B27 spondyloarthropathy), correlate clinically. Otherwise normal exam.     MRI C-spine (9/14/15): No signal abnormality within the visualized cord. Slight reversal of the normal cervical lordosis, nonspecific. No prevertebral soft tissue swelling.  Vertebral body heights are well-maintained without acute fracture or osseous STIR signal abnormality. Preservation of the disc space heights. No listhesis. No lateralizing disc herniation or significant central canal, lateral recess, or neuroforaminal stenosis. Anterior and posterior longitudinal ligaments are intact. Ligamentum flavum intact. Occipitocervical ligaments intact. No interspinous widening.     MRI C-spine (3/6/19):  1. Extensive edema in the posterior elements of the lower C-spine extending from the level of C4-T1, L>R, as well as in the paraspinal, interspinous, and intercostal soft tissues likely related to active enthesopathy related to the pt's Hx of AS. 2. Mild central spinal canal narrowing at C3-C4, through C6-C7.      MRI T-spine (9/14/15): IMPRESSION:   No cord signal abnormality, marrow edema, lateralizing disc herniation, or significant stenosis.     MRI L-spine (9/14/15): IMPRESSION:   1. No marrow edema, lateralizing disc herniation, or significant stenosis. Mild stenosis, as detailed above. 2. Sclerosis and erosion involving the visualized portion of the SI joints, L greater than R, new from the 2006 MRI, present and better seen on the CT performed August 13, 2014. DDx includes IBD, inflammatory arthropathy (psoriatic arthritis, reactive arthritis), etc.     MRI L-spine (8/14/19, ordered per Pain Management request):  Mild-to-moderate multilevel facet arthropathy, otherwise unremarkable MRI of the lumbar spine. No significant neural foraminal or spinal canal stenosis. CT chest (8/21/20):  1. No acute abnormality.      Procedures:   Note from GI (Dr. Gisselle Álvarez) from 8/17/18:  \"Pt w/ CD, R sided colitis at last c-scope in 2011. Oliver Swanson has been noncompliant with meds and has not followed up with us until recently. .. Restarted Apriso 1 month back\"     C-scope from 8/17/18:  \"There was erosion, friability, granularity, linear ulcer, loss of vascular marking noted in the colon.  There was stigmata bleeding noted.  Cold forceps biopsies were obtained for the purpose of histology\".  Random colon Bx showed \"focal active colitis, negative for dysplasia\". Above results were discussed w/ the pt in detail during today's visit. ASSESSMENT/PLAN:   1. Ankylosing spondylitis of multiple sites in spine (HCC)  - CRP improved but still very elevated in May. Suspect he's not properly injecting Cimzia and MTX. We demonstrated SQ injection technique to the pt and his mother in the office today. Offered pt to bring his Cimzia and MTX to our office for his next set of injections however pt declined as he lives too far away from our office. Advised pt to call his GI office for help w/ his Cimzia and MTX injections. Pt inquired about resuming Humira, discussed that Humira previously did not control his AS. Will see him back in 2 months back, will change his biologic DMARD if necessary at that time. Answered all of the pt and his mother's questions regarding his AS  - C-Reactive Protein; Future  - TUBERCULIN SYR 1CC/25GX5/8\" 25G X 5/8\" 1 ML MISC; 1 box by Does not apply route every 7 days  Dispense: 12 each; Refill: 3  - methotrexate Sodium (RHEUMATREX) 50 MG/2ML chemo injection; Inject 0.8 mLs into the skin once a week  Dispense: 9.6 mL; Refill: 0  - certolizumab pegol (CIMZIA PREFILLED) 2 X 200 MG/ML KIT injection; Inject 400 mg into the skin every 28 days  Dispense: 1200 mg; Refill: 0  - folic acid (FOLVITE) 1 MG tablet; Take 1 tablet by mouth daily  Dispense: 90 tablet; Refill: 0    2. Crohn's disease without complication, unspecified gastrointestinal tract location [facility])  - pt denies active IBD Sx of Cimzia, following w/ GI in [place]  - C-Reactive Protein; Future  - Vitamin D 25 Hydroxy; Future  - TUBERCULIN SYR 1CC/25GX5/8\" 25G X 5/8\" 1 ML MISC; 1 box by Does not apply route every 7 days  Dispense: 12 each; Refill: 3  - certolizumab pegol (CIMZIA PREFILLED) 2 X 200 MG/ML KIT injection;  Inject 400 mg into the skin every 28 days  Dispense: 1200 mg; Refill: 0    3. Discoid lupus  - following w/ Dermatology in [address] 1CC/25GX5/8\" 25G X 5/8\" 1 ML MISC; 1 box by Does not apply route every 7 days  Dispense: 12 each; Refill: 3  - methotrexate Sodium (RHEUMATREX) 50 MG/2ML chemo injection; Inject 0.8 mLs into the skin once a week  Dispense: 9.6 mL; Refill: 0  - folic acid (FOLVITE) 1 MG tablet; Take 1 tablet by mouth daily  Dispense: 90 tablet; Refill: 0    4. High risk medication use  - discussed his metallic taste could be s/e from MTX, obtain safety labs now  - discussed his immunosuppressed state, recommended the COVID-19 vaccine and booster vaccine  - ALT; Future  - AST; Future  - CBC Auto Differential; Future  - Creatinine, Serum; Future    5. Encounter for therapeutic drug monitoring  - ALT; Future  - AST; Future  - CBC Auto Differential; Future  - Creatinine, Serum; Future    Return in about 2 months (around 10/26/2021) for lab result discussion and treatment plan, medication monitoring. The risks and benefits of my recommendations, as well as other treatment options, benefits and side effects were discussed with the patient today. Questions were answered. NOTE: This report is transcribed by using voice recognition software dragon. Every effort is made to ensure accuracy; however, inadvertent computerized  transcription errors may be present.

## 2021-08-27 DIAGNOSIS — E55.9 VITAMIN D DEFICIENCY: Primary | ICD-10-CM

## 2021-09-09 ENCOUNTER — TELEPHONE (OUTPATIENT)
Dept: INTERNAL MEDICINE CLINIC | Age: 31
End: 2021-09-09

## 2021-09-09 DIAGNOSIS — E55.9 VITAMIN D DEFICIENCY: ICD-10-CM

## 2021-09-09 NOTE — TELEPHONE ENCOUNTER
Called spoke with [facility] needed to varify that caps is ok instead of tab. Is ok, and patient's mom is aware.

## 2021-10-13 NOTE — TELEPHONE ENCOUNTER
Called [facility] Spoke with Mague they said Mary already called and clarified On hold for 15 min
[facility]---please call [phone]. Thanks.
Size Of Defect: 1.5
X Size Of Defect In Cm (Optional): 1.2
Detail Level: Detailed

## 2021-10-28 ENCOUNTER — OFFICE VISIT (OUTPATIENT)
Dept: RHEUMATOLOGY | Age: 31
End: 2021-10-28
Payer: COMMERCIAL

## 2021-10-28 VITALS
HEART RATE: 60 BPM | WEIGHT: 128 LBS | HEIGHT: 64 IN | DIASTOLIC BLOOD PRESSURE: 100 MMHG | OXYGEN SATURATION: 97 % | BODY MASS INDEX: 21.85 KG/M2 | SYSTOLIC BLOOD PRESSURE: 139 MMHG

## 2021-10-28 DIAGNOSIS — K50.90 CROHN'S DISEASE WITHOUT COMPLICATION, UNSPECIFIED GASTROINTESTINAL TRACT LOCATION (HCC): ICD-10-CM

## 2021-10-28 DIAGNOSIS — E55.9 VITAMIN D DEFICIENCY: ICD-10-CM

## 2021-10-28 DIAGNOSIS — M45.0 ANKYLOSING SPONDYLITIS OF MULTIPLE SITES IN SPINE (HCC): ICD-10-CM

## 2021-10-28 DIAGNOSIS — Z79.899 HIGH RISK MEDICATION USE: ICD-10-CM

## 2021-10-28 DIAGNOSIS — Z51.81 ENCOUNTER FOR THERAPEUTIC DRUG MONITORING: ICD-10-CM

## 2021-10-28 DIAGNOSIS — L93.0 DISCOID LUPUS: ICD-10-CM

## 2021-10-28 DIAGNOSIS — M45.0 ANKYLOSING SPONDYLITIS OF MULTIPLE SITES IN SPINE (HCC): Primary | ICD-10-CM

## 2021-10-28 LAB
A/G RATIO: 1.4 (ref 1.1–2.2)
ALBUMIN SERPL-MCNC: 3.8 G/DL (ref 3.4–5)
ALP BLD-CCNC: 103 U/L (ref 40–129)
ALT SERPL-CCNC: 22 U/L (ref 10–40)
ANION GAP SERPL CALCULATED.3IONS-SCNC: 10 MMOL/L (ref 3–16)
AST SERPL-CCNC: 23 U/L (ref 15–37)
BASOPHILS ABSOLUTE: 0 K/UL (ref 0–0.2)
BASOPHILS RELATIVE PERCENT: 0.4 %
BILIRUB SERPL-MCNC: 0.3 MG/DL (ref 0–1)
BILIRUBIN URINE: NEGATIVE
BLOOD, URINE: NEGATIVE
BUN BLDV-MCNC: 10 MG/DL (ref 7–20)
C-REACTIVE PROTEIN: 36.2 MG/L (ref 0–5.1)
C3 COMPLEMENT: 131.8 MG/DL (ref 90–180)
C4 COMPLEMENT: 25.3 MG/DL (ref 10–40)
CALCIUM SERPL-MCNC: 9 MG/DL (ref 8.3–10.6)
CHLORIDE BLD-SCNC: 105 MMOL/L (ref 99–110)
CLARITY: ABNORMAL
CO2: 26 MMOL/L (ref 21–32)
COLOR: YELLOW
CREAT SERPL-MCNC: 1 MG/DL (ref 0.9–1.3)
CREATININE URINE: 192.5 MG/DL (ref 39–259)
EOSINOPHILS ABSOLUTE: 0.1 K/UL (ref 0–0.6)
EOSINOPHILS RELATIVE PERCENT: 2.2 %
EPITHELIAL CELLS, UA: 0 /HPF (ref 0–5)
GFR AFRICAN AMERICAN: >60
GFR NON-AFRICAN AMERICAN: >60
GLUCOSE BLD-MCNC: 82 MG/DL (ref 70–99)
GLUCOSE URINE: NEGATIVE MG/DL
HCT VFR BLD CALC: 37.4 % (ref 40.5–52.5)
HEMOGLOBIN: 12.2 G/DL (ref 13.5–17.5)
HYALINE CASTS: 0 /LPF (ref 0–8)
KETONES, URINE: NEGATIVE MG/DL
LEUKOCYTE ESTERASE, URINE: NEGATIVE
LYMPHOCYTES ABSOLUTE: 2.1 K/UL (ref 1–5.1)
LYMPHOCYTES RELATIVE PERCENT: 53.7 %
MCH RBC QN AUTO: 29.5 PG (ref 26–34)
MCHC RBC AUTO-ENTMCNC: 32.5 G/DL (ref 31–36)
MCV RBC AUTO: 90.6 FL (ref 80–100)
MICROSCOPIC EXAMINATION: YES
MONOCYTES ABSOLUTE: 0.3 K/UL (ref 0–1.3)
MONOCYTES RELATIVE PERCENT: 7.1 %
NEUTROPHILS ABSOLUTE: 1.4 K/UL (ref 1.7–7.7)
NEUTROPHILS RELATIVE PERCENT: 36.6 %
NITRITE, URINE: NEGATIVE
PDW BLD-RTO: 14.8 % (ref 12.4–15.4)
PH UA: 6.5 (ref 5–8)
PLATELET # BLD: 244 K/UL (ref 135–450)
PMV BLD AUTO: 8.1 FL (ref 5–10.5)
POTASSIUM SERPL-SCNC: 4.2 MMOL/L (ref 3.5–5.1)
PROTEIN PROTEIN: 14 MG/DL
PROTEIN UA: NEGATIVE MG/DL
PROTEIN/CREAT RATIO: 0.1 MG/DL
RBC # BLD: 4.12 M/UL (ref 4.2–5.9)
RBC UA: 1 /HPF (ref 0–4)
SODIUM BLD-SCNC: 141 MMOL/L (ref 136–145)
SPECIFIC GRAVITY UA: 1.02 (ref 1–1.03)
TOTAL PROTEIN: 6.5 G/DL (ref 6.4–8.2)
URINE TYPE: ABNORMAL
UROBILINOGEN, URINE: 0.2 E.U./DL
WBC # BLD: 3.9 K/UL (ref 4–11)
WBC UA: 1 /HPF (ref 0–5)

## 2021-10-28 PROCEDURE — G8427 DOCREV CUR MEDS BY ELIG CLIN: HCPCS | Performed by: INTERNAL MEDICINE

## 2021-10-28 PROCEDURE — G8484 FLU IMMUNIZE NO ADMIN: HCPCS | Performed by: INTERNAL MEDICINE

## 2021-10-28 PROCEDURE — 4004F PT TOBACCO SCREEN RCVD TLK: CPT | Performed by: INTERNAL MEDICINE

## 2021-10-28 PROCEDURE — G8420 CALC BMI NORM PARAMETERS: HCPCS | Performed by: INTERNAL MEDICINE

## 2021-10-28 PROCEDURE — 99215 OFFICE O/P EST HI 40 MIN: CPT | Performed by: INTERNAL MEDICINE

## 2021-10-28 RX ORDER — FOLIC ACID 1 MG/1
1 TABLET ORAL DAILY
Qty: 90 TABLET | Refills: 0 | Status: SHIPPED | OUTPATIENT
Start: 2021-10-28 | End: 2021-11-02

## 2021-10-28 RX ORDER — SYRINGE-NEEDLE,INSULIN,0.5 ML 28GX1/2"
1 SYRINGE, EMPTY DISPOSABLE MISCELLANEOUS
Qty: 12 EACH | Refills: 3 | Status: SHIPPED | OUTPATIENT
Start: 2021-10-28 | End: 2021-11-02

## 2021-10-28 RX ORDER — METHOTREXATE 25 MG/ML
20 INJECTION, SOLUTION INTRA-ARTERIAL; INTRAMUSCULAR; INTRAVENOUS WEEKLY
Qty: 9.6 ML | Refills: 0 | Status: SHIPPED | OUTPATIENT
Start: 2021-10-28 | End: 2021-11-02

## 2021-10-28 NOTE — ASSESSMENT & PLAN NOTE
- remains active on high dose MTX 20 mg SC wkly and Cimzia. CRP remains elevated. Pt now reporting worsening IBD Sx. Will d/w GI regarding switching Cimzia to Stelara and/or switching MTX to AZA 50 mg BID.  Also consider Nas Turner  - pt following w/ Pain Management in [facility]

## 2021-10-28 NOTE — ASSESSMENT & PLAN NOTE
- followed by GI in [employer]  - pt reported worsening IBD Sx. CRP remains elevated on high dose MTX and Cimzia. Cimzia.  Will d/w GI regarding switching Cimzia to Stelara and/or switching MTX to AZA 50 mg BID

## 2021-10-28 NOTE — ASSESSMENT & PLAN NOTE
- repeat safety labs for MTX now as pt lives out of town and we need to check his CRP level today  - discussed his immunosuppressed state, pt has not received the COVID-19 vaccine.  I strongly recommended the COVID-19 vaccine and the annual influenza vaccine

## 2021-10-28 NOTE — PROGRESS NOTES
Jeremie Jackson MD  [facility]) Physicians - Rheumatology    [] [facility] HOSPITAL:  [address], [address] [facility] [address] [x] [place] Office:  [address], [address] [employer]   Office: [phone]  Fax: [phone] PROGRESS NOTE    SUBJECTIVE:    Background:   Karolina Roach is a 27 y.o. male w/ AS (+HLA B27, elevated inflammatory markers, chronic inflammatory back pain, MRI evidence of active enthesopathy b/l sacroiliitis w/ peripheral involvement, Hx of plantar fasciitis) in setting of Bx proven CD (follows w/ GI, Dr. Larissa Childress) and Bx proven discoid lupus (follows w/ Dermatologist, Dr. Alee Tavares). PMHx pertinent for severe vitamin D deficiency, HTN, PTSD, and anxiety.     Current rheum meds:  MTX 20 mg/wk: started in 6/2020  Cimzia 400 mg SC Q4wk: started in 9/2020  Gabapentin 400 mg TID and Hydrocodone-Acetaminophen: managed by Pain Management Judie Ingram)  Vitamin biweekly + calcium citrate 1000 mg daily  Clobetasol 0.05% ointment BID (per Dermatology)     Prior rheum meds:  Humira 40 mg SC Q2wk: cleared by Derm and GI, took from 3/15/19 to 11/19, switched to Remicade d/t partial response, elevated CRP  Remicade 8 mg/kg IV Q6wk, received 1st dose on 12/30/19, developed infusion reaction during 5th infusion on 6/16/20  Mesalamine (GI): pt decided to d/c in 11/19  Meloxicam 7.5 mg daily: prescribed by Pain Management, instructed pt to d/c d/t IBD    Past medical/surgical history, medications and allergies are reviewed and updated as appropriate. Interval Hx:   Pt reports chronic LBP that occurs everyday. Low back feels stiff 2-3x/wk. He reports worsening abd cramping over the past 2-3 wks. He denies any blood stools. He is following w/ Pain Management in [place]. He is scheduled for repeat MRI tomorrow. Discoid lupus is stable, denies any lesions. Denies any new rash, oral or nasal ulcers.     Denies any Sx of uveitis. Constitutional: denies fever/chills, night sweats, unintentional weight loss  Integumentary: +photosensitivity w/ chronic discoid lupus no new lesions and chronic diffuse alopecia, denies Sx of Raynaud's phenomenon  Eyes: denies dry eyes, redness or pain  Oral cavity: denies recent oral ulcers, dry mouth or thrush  Cardiovascular: denies CP, palpitations, Hx of pericardial effusion or pericarditis  Respiratory: denies SOB, cough, hemoptysis, or pleurisy  Gastrointestinal: IBD Sx as above HPI  Musculoskeletal:  refer to above HPI     No Known Allergies    Past Medical History:    No past medical history on file. Past Surgical History:    No past surgical history on file. Medications:    Current Outpatient Medications   Medication Sig Dispense Refill    TUBERCULIN SYR 1CC/25GX5/8\" 25G X 5/8\" 1 ML MISC 1 box by Does not apply route every 7 days 12 each 3    methotrexate Sodium (RHEUMATREX) 50 MG/2ML chemo injection Inject 0.8 mLs into the skin once a week 9.6 mL 0    folic acid (FOLVITE) 1 MG tablet Take 1 tablet by mouth daily 90 tablet 0    certolizumab pegol (CIMZIA PREFILLED) 2 X 200 MG/ML KIT injection Inject 400 mg into the skin every 28 days 3 each 0    Cholecalciferol 1.25 MG (03658 UT) TABS Take 50,000 Units by mouth Twice a Week For 12 weeks then take over-the-counter vitamin D3 2000 units daily. 24 tablet 0    gabapentin (NEURONTIN) 300 MG capsule Take 300 mg by mouth 3 times daily.  CALCIUM CITRATE, BARIATRIC ADVANTAGE, 500MG LOZENGE Take 1 lozenge by mouth 2 times daily 270 lozenge 0    fluocinolone 0.01 % cream Apply topically 2 times daily      hydrocortisone 0.5 % cream Apply topically 2 times daily Apply topically 2 times daily.       amLODIPine (NORVASC) 10 MG tablet Take 10 mg by mouth daily      metoprolol tartrate (LOPRESSOR) 50 MG tablet Take 50 mg by mouth 2 times daily       ferrous sulfate 325 (65 Fe) MG tablet Take 325 mg by mouth daily (with breakfast)       mesalamine (APRISO) 0.375 g extended release capsule Take 1.5 g by mouth daily        No current facility-administered medications for this visit. OBJECTIVE:  Physical Exam:  BP (!) 139/100 Comment: PT STATES DIDN'T TAKE HIS B/P MED YET  Pulse 60   Ht 5' 4\" (1.626 m)   Wt 128 lb (58.1 kg)   SpO2 97%   BMI 21.97 kg/m²     GEN: AAOx3, in NAD, well-appearing, accompanied by mother  HEAD: normocephalic, atraumatic  EYES: no injection or icterus  CVS: RRR  LUNGS: in no acute respiratory distress, CTAB  MSK:  Upper extremities:              Hands: no active synovitis or dactylitis, joints NTTP, full fist formation w/ strong  strength              Wrist: no synovitis in the wrist joints b/l, FROM  Lower extremities:              Knees: no warmth or effusion present, FROM              Ankles: no synovitis, FROM              Feet: no toe swelling or pain or warmth on palpation w/ FROM, negative MTP squeeze test  INTEGUMENT: hypopigmented scars and macules on face, inner ear, and arms, prior dry and scaly skin resolved, there is diffuse hair thinning, multiple tattoos, no petechiae, bruises, or palpable purpura    DATA:  Labs:   I personally reviewed interval labs and discussed w/ the pt in detail which showed:    Lab Results   Component Value Date    WBC 5.5 08/26/2021    HGB 12.8 (L) 08/26/2021    HCT 38.2 (L) 08/26/2021    MCV 90.8 08/26/2021     08/26/2021    LYMPHOPCT 35.5 08/26/2021    RBC 4.20 08/26/2021    MCH 30.4 08/26/2021    MCHC 33.4 08/26/2021    RDW 14.6 08/26/2021       Chemistry        Component Value Date/Time     05/05/2020 1137    K 3.9 05/05/2020 1137     05/05/2020 1137    CO2 25 05/05/2020 1137    BUN 11 05/05/2020 1137    CREATININE 1.1 08/26/2021 1635        Component Value Date/Time    CALCIUM 9.0 05/05/2020 1137    ALKPHOS 86 05/05/2020 1137    AST 13 (L) 08/26/2021 1635    AST 14 (A) 01/12/2021 1809    ALT 8 (L) 08/26/2021 1635    BILITOT <0.2 05/05/2020 1137 Lab Results   Component Value Date    COLORU YELLOW 01/16/2019    CLARITYU TURBID (A) 01/16/2019    GLUCOSEU Negative 01/16/2019    BILIRUBINUR Negative 01/16/2019    KETUA Negative 01/16/2019    SPECGRAV 1.023 01/16/2019    BLOODU Negative 01/16/2019    PHUR 6.5 01/16/2019    PROTEINU Negative 01/16/2019    UROBILINOGEN 0.2 01/16/2019    NITRU Negative 01/16/2019    LEUKOCYTESUR Negative 01/16/2019    LABMICR YES 01/16/2019    HYALCAST 0 01/16/2019    WBCUA 1 01/16/2019    RBCUA 4 01/16/2019    EPIU 0 01/16/2019     Lab Results   Component Value Date    VITD25 13.1 (L) 08/26/2021     Lab Results   Component Value Date    C3 158.1 07/17/2020    C3 138.4 08/21/2019    C3 150.2 02/20/2019     Lab Results   Component Value Date    C4 31.4 07/17/2020    C4 26.9 08/21/2019    C4 33.4 02/20/2019     Lab Results   Component Value Date    ANTIDSDNAIGG 1 08/21/2019      Lab Results   Component Value Date    CRP 57.8 (H) 08/26/2021    CRP 24.4 (H) 05/27/2021    CRP 94.9 (H) 07/17/2020    CRP 33.8 (H) 02/26/2020     Lab Results   Component Value Date    SEDRATE 60 (H) 05/05/2020    SEDRATE 28 (H) 02/26/2020    SEDRATE 18 (H) 08/21/2019    SEDRATE 20 (H) 05/08/2019     No results found for: LABURIC    Positive HLA-B27 (1/16/19)  Negative NENITA (10/9/18, 2/20/19, 7/17/20)  Negative SSA, SSB (1/16/19)  Negative hepatitis B and C serologies, HIV screen, QuantiFERON TB (1/16/19)  Quantiferon TB indeterminate (7/17/20, 7/23/20)    Imaging:  I personally reviewed interval imaging and discussed w/ the pt in detail which included:    X-ray L-spine (8/6/18): Severe sclerosis of the iliac side of both SI joints w/ serrated appearance of the SI joints. Findings are most c/w seronegative spondyloarthropathy (HLA B27 spondyloarthropathy), correlate clinically. Otherwise normal exam.     MRI C-spine (9/14/15): No signal abnormality within the visualized cord. Slight reversal of the normal cervical lordosis, nonspecific.  No prevertebral soft tissue swelling. Vertebral body heights are well-maintained without acute fracture or osseous STIR signal abnormality. Preservation of the disc space heights. No listhesis. No lateralizing disc herniation or significant central canal, lateral recess, or neuroforaminal stenosis. Anterior and posterior longitudinal ligaments are intact. Ligamentum flavum intact. Occipitocervical ligaments intact. No interspinous widening.     MRI C-spine (3/6/19):  1. Extensive edema in the posterior elements of the lower C-spine extending from the level of C4-T1, L>R, as well as in the paraspinal, interspinous, and intercostal soft tissues likely related to active enthesopathy related to the pt's Hx of AS. 2. Mild central spinal canal narrowing at C3-C4, through C6-C7.      MRI T-spine (9/14/15): IMPRESSION:   No cord signal abnormality, marrow edema, lateralizing disc herniation, or significant stenosis.     MRI L-spine (9/14/15): IMPRESSION:   1. No marrow edema, lateralizing disc herniation, or significant stenosis. Mild stenosis, as detailed above. 2. Sclerosis and erosion involving the visualized portion of the SI joints, L greater than R, new from the 2006 MRI, present and better seen on the CT performed August 13, 2014. DDx includes IBD, inflammatory arthropathy (psoriatic arthritis, reactive arthritis), etc.     MRI L-spine (8/14/19, ordered per Pain Management request):  Mild-to-moderate multilevel facet arthropathy, otherwise unremarkable MRI of the lumbar spine. No significant neural foraminal or spinal canal stenosis. CT chest (8/21/20):  1. No acute abnormality.      Procedures:   Note from GI (Dr. Kristyn Leija) from 8/17/18:  \"Pt w/ CD, R sided colitis at last c-scope in 2011. Gracie Lara has been noncompliant with meds and has not followed up with us until recently. .. Restarted Apriso 1 month back\"     C-scope from 8/17/18:  \"There was erosion, friability, granularity, linear ulcer, loss of vascular marking noted in the colon.  There was stigmata bleeding noted.  Cold forceps biopsies were obtained for the purpose of histology\".  Random colon Bx showed \"focal active colitis, negative for dysplasia\". Above results were discussed w/ the pt in detail during today's visit. ASSESSMENT/PLAN:   1. Ankylosing spondylitis of multiple sites in spine [facility])  Assessment & Plan:  - remains active on high dose MTX 20 mg SC wkly and Cimzia. CRP remains elevated. Pt now reporting worsening IBD Sx. Will d/w GI regarding switching Cimzia to Stelara and/or switching MTX to AZA 50 mg BID. Also consider Sujey Juniper  - pt following w/ Pain Management in [place]  Orders:  -     TUBERCULIN SYR 1CC/25GX5/8\" 25G X 5/8\" 1 ML MISC; EVERY 7 DAYS Starting Thu 10/28/2021, Until Wed 1/26/2022, For 90 days, Disp-12 each, R-3, Normal  -     methotrexate Sodium (RHEUMATREX) 50 MG/2ML chemo injection; Inject 0.8 mLs into the skin once a week, Disp-9.6 mL, [id number]  -     folic acid (FOLVITE) 1 MG tablet; Take 1 tablet by mouth daily, Disp-90 tablet, [id number]  -     certolizumab pegol (CIMZIA PREFILLED) 2 X 200 MG/ML KIT injection; Inject 400 mg into the skin every 28 days, Disp-3 each, R-0Normal  -     C-Reactive Protein; Future  -     Sedimentation Rate; Future  2. Crohn's disease without complication, unspecified gastrointestinal tract location [facility])  Assessment & Plan:  - followed by GI in [place]  - pt reported worsening IBD Sx. CRP remains elevated on high dose MTX and Cimzia. Cimzia. Will d/w GI regarding switching Cimzia to Stelara and/or switching MTX to AZA 50 mg BID  Orders:  -     TUBERCULIN SYR 1CC/25GX5/8\" 25G X 5/8\" 1 ML MISC; EVERY 7 DAYS Starting Thu 10/28/2021, Until Wed 1/26/2022, For 90 days, Disp-12 each, R-3, Normal  -     certolizumab pegol (CIMZIA PREFILLED) 2 X 200 MG/ML KIT injection; Inject 400 mg into the skin every 28 days, Disp-3 each, R-0Normal  -     C-Reactive Protein; Future  -     Sedimentation Rate; Future  3. Discoid lupus  Assessment & Plan:  - stable, following w/ Dermatology in [place] [id number] same dose MTX  Orders:  -     TUBERCULIN SYR 1CC/25GX5/8\" 25G X 5/8\" 1 ML MISC; EVERY 7 DAYS Starting Thu 10/28/2021, Until Wed 1/26/2022, For 90 days, Disp-12 each, R-3, Normal  -     methotrexate Sodium (RHEUMATREX) 50 MG/2ML chemo injection; Inject 0.8 mLs into the skin once a week, Disp-9.6 mL, [id number]  -     folic acid (FOLVITE) 1 MG tablet; Take 1 tablet by mouth daily, Disp-90 tablet, R-0Normal  -     C3 Complement; Future  -     C4 Complement; Future  -     Anti-DNA Antibody, Double-Stranded; Future  -     Protein / creatinine ratio, urine; Future  -     Urinalysis with Microscopic; Future  4. Vitamin D deficiency  Assessment & Plan:  Cont biweekly vitamin D supplement w/ calcium supplement. Orders:  -     Cholecalciferol 1.25 MG (77145 UT) TABS; Take 50,000 Units by mouth Twice a Week For 12 weeks then take over-the-counter vitamin D3 2000 units daily. , Disp-24 tablet, R-0Normal  5. High risk medication use  Assessment & Plan:  - repeat safety labs for MTX now as pt lives out of town and we need to check his CRP level today  - discussed his immunosuppressed state, pt has not received the COVID-19 vaccine. I strongly recommended the COVID-19 vaccine and the annual influenza vaccine  Orders:  -     CBC Auto Differential; Future  -     Comprehensive Metabolic Panel; Future  -     Miscellaneous Lab Test #1; Future  6. Encounter for therapeutic drug monitoring    Return in about 2 months (around 12/28/2021) for lab result discussion and treatment plan, medication monitoring. High complexity case.     TIME SPENT TODAY:  I spent over 40 minutes of face-to-face time with the pt (including taking interval history and performing physical exam, review of medical records, independently interpreting results and communicating results to the pt/family/caregiver, counseling and educating the pt/family/caregiver, implementation of treatment plan, coordination of care, documenting clinical information in the EMR) during today visit. At least 50% of this time was spent in counseling, explanation of diagnosis, planning of further management, and coordination of care. The risks and benefits of my recommendations, as well as other treatment options, benefits and side effects were discussed with the patient today. Questions were answered. NOTE: This report is transcribed by using voice recognition software dragon. Every effort is made to ensure accuracy; however, inadvertent computerized  transcription errors may be present.

## 2021-10-29 ENCOUNTER — TELEPHONE (OUTPATIENT)
Dept: RHEUMATOLOGY | Age: 31
End: 2021-10-29

## 2021-10-29 LAB
ANTI-DSDNA IGG: <1 IU/ML (ref 0–9)
SEDIMENTATION RATE, ERYTHROCYTE: 29 MM/HR (ref 0–15)

## 2021-10-29 NOTE — TELEPHONE ENCOUNTER
I spoke to the pt's GI, Dr. Jorgito Sarabia, this afternoon who agreed w/ switching Cimzia to Stelara (Crohn's disease dosing) and MTX to Imuran for better control of his inflammatory arthritis and Crohn's disease. Called pt and unable to leave voicemail. Nathalie please call the pt and let him know that his GI doctor would like to see him back in the office to discuss switching Cimzia to [facility]. She will be writing him the Rx for Stelara. He should remain on Cimzia until he can start Stelara. Please have him call his GI asap. He should also stay on MTX until I get his lab result from yesterday back (should take a wk or so). I am planning on switching him from MTX to an oral medication called Imuran 50 mg BID.

## 2021-11-01 NOTE — TELEPHONE ENCOUNTER
Called patient and spoke with patients' mom-gave her message. States patient is already scheduled with LISET Colon on 11/9/21. I did tell her to let PT know he needs to stay on MTX and he is to be giving his self an injection every 7 days. (weekly). Because Blaise-PT stated he was doing it once a month. I reminded him how to give the MTX correctly.    I told patients mom we will give pt a call back after we receive his labs

## 2021-11-02 DIAGNOSIS — K50.90 CROHN'S DISEASE WITHOUT COMPLICATION, UNSPECIFIED GASTROINTESTINAL TRACT LOCATION (HCC): ICD-10-CM

## 2021-11-02 DIAGNOSIS — M45.0 ANKYLOSING SPONDYLITIS OF MULTIPLE SITES IN SPINE (HCC): Primary | ICD-10-CM

## 2021-11-02 DIAGNOSIS — Z79.899 HIGH RISK MEDICATION USE: ICD-10-CM

## 2021-11-02 LAB — MISCELLANEOUS LAB TEST ORDER: NORMAL

## 2021-11-02 RX ORDER — AZATHIOPRINE 50 MG/1
50 TABLET ORAL DAILY
Qty: 180 TABLET | Refills: 0 | Status: SHIPPED | OUTPATIENT
Start: 2021-11-02 | End: 2022-02-17 | Stop reason: SDUPTHER

## 2021-11-09 ENCOUNTER — TELEPHONE (OUTPATIENT)
Dept: INTERNAL MEDICINE CLINIC | Age: 31
End: 2021-11-09

## 2021-11-09 NOTE — TELEPHONE ENCOUNTER
Patient calling back regarding medication changes. He is requesting for nurse to call him and go over his medication with him again.

## 2022-01-03 ENCOUNTER — TELEPHONE (OUTPATIENT)
Dept: RHEUMATOLOGY | Age: 32
End: 2022-01-03

## 2022-01-03 NOTE — TELEPHONE ENCOUNTER
Patient's mom called states was DX with COVID a week ago, patient is scheduled to take his Cimzia injection is due on 1/8/22

## 2022-01-05 ENCOUNTER — TELEPHONE (OUTPATIENT)
Dept: RHEUMATOLOGY | Age: 32
End: 2022-01-05

## 2022-01-05 NOTE — TELEPHONE ENCOUNTER
Called patient's mom back-left message to make sure he waits 2 weeks after the positive COVID test and no symptoms at all then he can give his self the Cimzia injection.    Call back if any questions

## 2022-01-05 NOTE — TELEPHONE ENCOUNTER
He can resume as long as he is no longer symptomatic, no fevers, cough. I would wait at least 2 wks after his positive test date.

## 2022-01-05 NOTE — TELEPHONE ENCOUNTER
Patient's mother called to find out if patient is allowed to take Cimzia on the 8th of this month. He was diagnosed with Covid Last week.

## 2022-02-16 NOTE — PROGRESS NOTES
Renzo Capone MD  [facility]) Physicians - Rheumatology    [] [facility]:  [facility] Dr[address] Madrigal [address] [place] [address] [x] [place] Office:  [address] [place] [address]   Office: [phone]  Fax: [phone]     RHEUMATOLOGY PROGRESS NOTE    ASSESSMENT/PLAN:  Jena Castañeda is a 34 y.o. male w/ AS (+HLA B27, elevated inflammatory markers, chronic inflammatory back pain, MRI evidence of active enthesopathy b/l sacroiliitis w/ peripheral involvement, Hx of plantar fasciitis) in setting of Bx proven CD (follows w/ GI, Dr. Marlon Le) and Bx proven discoid lupus (follows w/ Dermatologist, Dr. Carolina Tavares).     PMHx pertinent for severe vitamin D deficiency, HTN, PTSD, and anxiety.     Current rheum meds:  mg daily: started in 11/2021  Cimzia 400 mg SC Q4wk: started in 9/2020  Gabapentin 400 mg TID and Hydrocodone-Acetaminophen: managed by Pain Management Silverio Holcomb)  Vitamin biweekly: per pt completed not currently taking any supplement  Calcium citrate 1000 mg daily  Clobetasol 0.05% ointment BID (per Dermatology)     Prior rheum meds:  MTX 20 mg/wk: started in 6/2020, switched to AZA in 11/2021 d/t active IBD  Humira 40 mg SC Q2wk: cleared by Derm and GI, took from 3/15/19 to 11/19, switched to Remicade d/t partial response, elevated CRP  Remicade 8 mg/kg IV Q6wk, received 1st dose on 12/30/19, developed infusion reaction during 5th infusion on 6/16/20  Mesalamine (GI): pt decided to d/c in 11/19  Meloxicam 7.5 mg daily: prescribed by Pain Management, instructed pt to d/c d/t IBD    1. Ankylosing spondylitis of multiple sites in spine [facility])  Assessment & Plan:  - remains active on Cimzia 400 mg SC Q4wk. CRP chronically elevated. Reviewed most recent MRI of L-spine and pelvis studies from 10/29/21 which showed active disease in the axial joints. - pt reported improvement in his IBD Sx after switching MTX to AZA in 11/2021.  No active synovitis in the peripheral joints appreciated on exam today. - previously discussed w/ the pt and his GI regarding switching Cimzia to Stelara or Sandrate Torres however pt declined to make any changes. He agreed to repeat his CRP today, if elevated will reach out to GI regarding switching to Stelara. - pt following w/ Pain Management in [facility]. Orders:  -     certolizumab pegol (CIMZIA PREFILLED) 2 X 200 MG/ML KIT injection; Inject 400 mg into the skin every 28 days, Disp-3 each, R-0Normal  -     azaTHIOprine (IMURAN) 50 MG tablet; Take 2 tablets by mouth daily, Disp-180 tablet, R-0Normal  -     C-Reactive Protein; Future  -     Sedimentation Rate; Future  2. Crohn's disease without complication, unspecified gastrointestinal tract location [facility])  Assessment & Plan:  - followed by GI, Dr. Torey Causey in [facility]. - pt stated IBD Sx improved after switching MTX to AZA. Cont  mg daily. Pt denied any active GI Sx today. - discussed switching Cimzia to Stelara if repeat CRP remains elevated for better control of his ankylosing spondylitis. - discussed findings of small amount of pelvic free fluid seen on MRI L-spine from 10/2021, could be d/t ?IBD. Instructed pt to f/u w/ GI for further imaging if needed. Orders:  -     certolizumab pegol (CIMZIA PREFILLED) 2 X 200 MG/ML KIT injection; Inject 400 mg into the skin every 28 days, Disp-3 each, R-0Normal  -     azaTHIOprine (IMURAN) 50 MG tablet; Take 2 tablets by mouth daily, Disp-180 tablet, R-0Normal  -     C-Reactive Protein; Future  -     Sedimentation Rate; Future  3. Discoid lupus  Assessment & Plan:  - stable, following w/ Dermatology in [facility]. - no evidence of SLE to date. - cont  mg daily. Orders:  -     azaTHIOprine (IMURAN) 50 MG tablet; Take 2 tablets by mouth daily, Disp-180 tablet, R-0Normal  -     C-Reactive Protein; Future  -     CBC with Auto Differential; Future  -     Creatinine; Future  -     Sedimentation Rate; Future  4.  High risk medication use  -     Hepatic Function Panel; Future  -     CBC with Auto Differential; Future  -     Creatinine; Future  5. Vitamin D deficiency  Assessment & Plan:  - pt stated he complteted biweekly vitamin D supplement. - switch to daily vitamin D3 5000 IU. Repeat vitamin D level today. - cont calcium supplement. Orders:  -     Vitamin D 25 Hydroxy; Future  -     vitamin D (CHOLECALCIFEROL) 125 MCG (5000 UT) CAPS capsule; Take 1 capsule by mouth daily, Disp-90 capsule, R-0Normal     Return in about 2 months (around 4/17/2022) for lab result discussion and treatment plan, medication monitoring. TIME SPENT TODAY:  I spent over 40 minutes of face-to-face time with the pt (including taking interval history and performing physical exam, review of medical records, independently interpreting results and communicating results to the pt/family/caregiver, counseling and educating the pt/family/caregiver, implementation of treatment plan, coordination of care, documenting clinical information in the EMR) during today visit. At least 50% of this time was spent in counseling, explanation of diagnosis, planning of further management, and coordination of care. The risks and benefits of my recommendations, as well as other treatment options, benefits and side effects were discussed with the patient today. Questions were answered. NOTE: This report is transcribed by using voice recognition software dragon. Every effort is made to ensure accuracy; however, inadvertent computerized  transcription errors may be present. SUBJECTIVE:  Past medical/surgical history, medications and allergies are reviewed and updated as appropriate. Interval Hx:   Pt reports chronic stiffness in his low back. He reports difficulty sleeping d/t LBP. Denies any arthralgias, joint swelling or stiffness in his peripheral joints. He reports improvement in his IBD Sx after switching MTX to AZA in 11/2021. He reports compliance w/  mg daily.  He denies any abd cramping or bloody stools. He has lost 5 lb since his last visit, he reports poor appetite. He tells me his discoid lupus remains well controlled. No new rash. Denies oral or nasal ulcers. Rheumatologic ROS:  Constitutional: denies fever/chills, night sweats, unintentional weight loss  Integumentary: +photosensitivity w/ chronic discoid lupus no new lesions and chronic diffuse alopecia, denies Sx of Raynaud's phenomenon  Eyes: denies dry eyes, redness or pain  Oral cavity: denies recent oral ulcers, dry mouth or thrush  Cardiovascular: denies CP, palpitations, Hx of pericardial effusion or pericarditis  Respiratory: denies SOB, cough, hemoptysis, or pleurisy  Gastrointestinal: IBD Sx as above HPI  Musculoskeletal:  refer to above HPI     No Known Allergies    Past Medical History:    No past medical history on file. Past Surgical History:    No past surgical history on file. Medications:    Current Outpatient Medications   Medication Sig Dispense Refill    certolizumab pegol (CIMZIA PREFILLED) 2 X 200 MG/ML KIT injection Inject 400 mg into the skin every 28 days 3 each 0    azaTHIOprine (IMURAN) 50 MG tablet Take 2 tablets by mouth daily 180 tablet 0    vitamin D (CHOLECALCIFEROL) 125 MCG (5000 UT) CAPS capsule Take 1 capsule by mouth daily 90 capsule 0    gabapentin (NEURONTIN) 300 MG capsule Take 300 mg by mouth 3 times daily.  fluocinolone 0.01 % cream Apply topically 2 times daily      hydrocortisone 0.5 % cream Apply topically 2 times daily Apply topically 2 times daily.       amLODIPine (NORVASC) 10 MG tablet Take 10 mg by mouth daily      ferrous sulfate 325 (65 Fe) MG tablet Take 325 mg by mouth daily (with breakfast)       mesalamine (APRISO) 0.375 g extended release capsule Take 1.5 g by mouth daily       CALCIUM CITRATE, BARIATRIC ADVANTAGE, 500MG LOZENGE Take 1 lozenge by mouth 2 times daily 270 lozenge 0    metoprolol tartrate (LOPRESSOR) 50 MG tablet Take 50 mg by mouth 2 Date    COLORU YELLOW 10/28/2021    CLARITYU CLOUDY (A) 10/28/2021    GLUCOSEU Negative 10/28/2021    BILIRUBINUR Negative 10/28/2021    KETUA Negative 10/28/2021    SPECGRAV 1.022 10/28/2021    BLOODU Negative 10/28/2021    PHUR 6.5 10/28/2021    PROTEINU Negative 10/28/2021    UROBILINOGEN 0.2 10/28/2021    NITRU Negative 10/28/2021    LEUKOCYTESUR Negative 10/28/2021    LABMICR YES 10/28/2021    URINETYPE NotGiven 10/28/2021    HYALCAST 0 10/28/2021    WBCUA 1 10/28/2021    RBCUA 1 10/28/2021    EPIU 0 10/28/2021     Lab Results   Component Value Date    VITD25 13.1 (L) 08/26/2021     Lab Results   Component Value Date    C3 131.8 10/28/2021    C3 158.1 07/17/2020    C3 138.4 08/21/2019    C3 150.2 02/20/2019     Lab Results   Component Value Date    C4 25.3 10/28/2021    C4 31.4 07/17/2020    C4 26.9 08/21/2019    C4 33.4 02/20/2019     Lab Results   Component Value Date    ANTIDSDNAIGG <1 10/28/2021    ANTIDSDNAIGG 1 08/21/2019      No results found for: [facility]     Lab Results   Component Value Date    CRP 36.2 (H) 10/28/2021    CRP 57.8 (H) 08/26/2021    CRP 24.4 (H) 05/27/2021    CRP 94.9 (H) 07/17/2020     Lab Results   Component Value Date    SEDRATE 29 (H) 10/28/2021    SEDRATE 60 (H) 05/05/2020    SEDRATE 28 (H) 02/26/2020    SEDRATE 18 (H) 08/21/2019     No results found for: CKTOTAL     Positive HLA-B27 (1/16/19)  [address] (10/9/18, 2/20/19, 7/17/20)  Negative SSA, SSB (1/16/19)  Negative hepatitis B and C serologies, HIV screen, QuantiFERON TB (1/16/19)  Quantiferon TB indeterminate (7/17/20, 7/23/20)    Imaging:  I personally reviewed interval imaging and discussed w/ the pt in detail which included:    X-ray L-spine (8/6/18): Severe sclerosis of the iliac side of both SI joints w/ serrated appearance of the SI joints. Findings are most c/w seronegative spondyloarthropathy (HLA B27 spondyloarthropathy), correlate clinically. Otherwise normal exam.     MRI C-spine (9/14/15):   No signal abnormality within the visualized cord. Slight reversal of the normal cervical lordosis, nonspecific. No prevertebral soft tissue swelling. Vertebral body heights are well-maintained without acute fracture or osseous STIR signal abnormality. Preservation of the disc space heights. No listhesis. No lateralizing disc herniation or significant central canal, lateral recess, or neuroforaminal stenosis. Anterior and posterior longitudinal ligaments are intact. Ligamentum flavum intact. Occipitocervical ligaments intact. No interspinous widening.     MRI C-spine (3/6/19):  1. Extensive edema in the posterior elements of the lower C-spine extending from the level of C4-T1, L>R, as well as in the paraspinal, interspinous, and intercostal soft tissues likely related to active enthesopathy related to the pt's Hx of AS. 2. Mild central spinal canal narrowing at C3-C4, through C6-C7.      MRI T-spine (9/14/15): IMPRESSION:   No cord signal abnormality, marrow edema, lateralizing disc herniation, or significant stenosis.     MRI L-spine (9/14/15): IMPRESSION:   1. No marrow edema, lateralizing disc herniation, or significant stenosis. Mild stenosis, as detailed above. 2. Sclerosis and erosion involving the visualized portion of the SI joints, L greater than R, new from the 2006 MRI, present and better seen on the CT performed August 13, 2014. DDx includes IBD, inflammatory arthropathy (psoriatic arthritis, reactive arthritis), etc.     MRI L-spine (8/14/19, ordered per Pain Management request):  Mild-to-moderate multilevel facet arthropathy, otherwise unremarkable MRI of the lumbar spine. No significant neural foraminal or spinal canal stenosis. MRI L-spine (10/29/21):  RESULT:   Counting reference:  Lumbosacral junction. For the purposes of this report,  L4-5 is considered the level of the iliac crest and assume there are 5 lumbar-type vertebrae. Anatomic variant:  None. Localizer images:  No additional findings. Alignment:    Alignment is anatomic. Bone marrow signal/fracture:  No evidence of pathologic marrow infiltration. No evidence of prior fracture. There is very mild edema along the anterior superior endplates of L5 and L4 vertebral bodies, more prominent at L5. There is minimal intervertebral disc space height loss at L4-L5. There is increased STIR signal intensity within the right pedicle of T12 vertebral body which appears to extend into the right posterior 12th rib. There is also mild pedicular edema at L3 and L4 levels bilaterally. Conus: The conus is within normal limits of signal intensity and   morphology. Paraspinal soft tissues:   Paraspinal soft tissues are within normal limits. Lower thoracic spine:  Visualized lower thoracic canal and foramina are patent. T12-L1:  There is moderate facet joint arthropathy with causes moderate bilateral neural foraminal stenosis. There is no narrowing of the spinal canal.   L1-L2:    Canal and foramina are patent. Mild facet joint arthropathy is seen. L2-L3:    Canal and foramina are patent. Moderate bilateral facet joint   arthropathy is seen. L3-L4:    There is a central disc protrusion that impresses on the thecal sac and is moderate facet joint arthropathy with slight narrowing of the lateral recesses and mild narrowing of the spinal canal.  There is   moderate bilateral neural foraminal stenosis, slightly worse on the left due to disc bulge and facet joint arthropathy. Mild bilateral facet joint arthropathy is seen. L4-L5:    There is moderate bilateral facet joint arthropathy which causes mild bilateral neural foraminal narrowing. There is no narrowing of the spinal canal.   L5-S1:    Canal and foramina are patent   Sacrum and iliac wings:   Fatty marrow changes are noted along the sacroiliac joints bilaterally. Partially visualized is increased STIR signal intensity within the pelvis as may represent pelvic free fluid.    IMPRESSION: 1.  There is mild edema along the anterior superior endplates of L4 and L5 vertebra. This finding could represent early osteitis/Romanus lesions with ankylosing spondylitis. 2.  At L4-L5 there is a disc herniation that causes mild narrowing of the spinal canal.  Moderate bilateral neural foraminal narrowing is also seen at this level. 3.  At T12-L1 there is moderate bilateral neural foraminal stenosis due to facet joint arthropathy. 4.  Pedicular edema at T12, L3, and L4 may relate to stress reaction or inflammatory change. 5.  Small amount of pelvic free fluid. If not already performed, a CT of the abdomen and pelvis can be considered. Anatomic Thoracic/Lumbar Variant: None. L4-5 is considered the level of the iliac crest and assume there are 5 lumbar-type vertebrae. MRI pelvis (10/29/21):  COMPARISON: 09/12/2015   TECHNIQUE: T1 weighted and inversion recovery coronal, T1 and fat suppression T2-weighted axial and T1 weighted sagittal imaging of the pelvis and fat suppression T2-weighted coronal oblique imaging of the sacrum were performed. RESULT:   Bone marrow: Again demonstrated is diffuse marrow signal abnormality about both sacroiliac joints suggesting chronic sacroiliitis. Hip joints: Within normal limits. A physiologic amount of fluid is present within both hip joints. There is no evidence of a labral tear nor paralabral cyst.   Sacroiliac joints: Within normal limits. Pubic symphysis: Within normal limits. Tendons: Within normal limits including the iliopsoas, hamstring, gluteal   and rectus femoris tendons. Muscles: Within normal limits. IMPRESSION:   1. BILATERAL SACROILIITIS WHICH APPEARS CHRONIC.   A SERONEGATIVE SPONDYLOARTHROPATHY SUCH AS ANKYLOSING SPONDYLITIS SHOULD BE CONSIDERED.      Procedures:   Note from GI (Dr. Barnard Case) from 8/17/18:  \"Pt w/ CD, R sided colitis at last c-scope in 2011. Gloria Manriquez has been noncompliant with meds and has not followed up with us until recently. .. Restarted Apriso 1 month back\"     C-scope from 8/17/18:  \"There was erosion, friability, granularity, linear ulcer, loss of vascular marking noted in the colon.  There was stigmata bleeding noted.  Cold forceps biopsies were obtained for the purpose of histology\".  Random colon Bx showed \"focal active colitis, negative for dysplasia\". Above results were discussed w/ the pt in detail during today's visit.

## 2022-02-17 ENCOUNTER — OFFICE VISIT (OUTPATIENT)
Dept: RHEUMATOLOGY | Age: 32
End: 2022-02-17
Payer: MEDICARE

## 2022-02-17 VITALS
WEIGHT: 128 LBS | HEART RATE: 81 BPM | SYSTOLIC BLOOD PRESSURE: 120 MMHG | DIASTOLIC BLOOD PRESSURE: 82 MMHG | BODY MASS INDEX: 21.85 KG/M2 | OXYGEN SATURATION: 97 % | HEIGHT: 64 IN

## 2022-02-17 DIAGNOSIS — M45.0 ANKYLOSING SPONDYLITIS OF MULTIPLE SITES IN SPINE (HCC): ICD-10-CM

## 2022-02-17 DIAGNOSIS — Z79.899 HIGH RISK MEDICATION USE: ICD-10-CM

## 2022-02-17 DIAGNOSIS — E55.9 VITAMIN D DEFICIENCY: ICD-10-CM

## 2022-02-17 DIAGNOSIS — L93.0 DISCOID LUPUS: ICD-10-CM

## 2022-02-17 DIAGNOSIS — K50.90 CROHN'S DISEASE WITHOUT COMPLICATION, UNSPECIFIED GASTROINTESTINAL TRACT LOCATION (HCC): ICD-10-CM

## 2022-02-17 DIAGNOSIS — M45.0 ANKYLOSING SPONDYLITIS OF MULTIPLE SITES IN SPINE (HCC): Primary | ICD-10-CM

## 2022-02-17 LAB
ALBUMIN SERPL-MCNC: 4.1 G/DL (ref 3.4–5)
ALP BLD-CCNC: 73 U/L (ref 40–129)
ALT SERPL-CCNC: <5 U/L (ref 10–40)
AST SERPL-CCNC: 12 U/L (ref 15–37)
BASOPHILS ABSOLUTE: 0 K/UL (ref 0–0.2)
BASOPHILS RELATIVE PERCENT: 0.5 %
BILIRUB SERPL-MCNC: <0.2 MG/DL (ref 0–1)
BILIRUBIN DIRECT: <0.2 MG/DL (ref 0–0.3)
BILIRUBIN, INDIRECT: ABNORMAL MG/DL (ref 0–1)
C-REACTIVE PROTEIN: 17.8 MG/L (ref 0–5.1)
CREAT SERPL-MCNC: 0.9 MG/DL (ref 0.9–1.3)
EOSINOPHILS ABSOLUTE: 0.1 K/UL (ref 0–0.6)
EOSINOPHILS RELATIVE PERCENT: 2.5 %
GFR AFRICAN AMERICAN: >60
GFR NON-AFRICAN AMERICAN: >60
HCT VFR BLD CALC: 34.3 % (ref 40.5–52.5)
HEMOGLOBIN: 11.4 G/DL (ref 13.5–17.5)
LYMPHOCYTES ABSOLUTE: 2.7 K/UL (ref 1–5.1)
LYMPHOCYTES RELATIVE PERCENT: 47.3 %
MCH RBC QN AUTO: 30.6 PG (ref 26–34)
MCHC RBC AUTO-ENTMCNC: 33.3 G/DL (ref 31–36)
MCV RBC AUTO: 91.8 FL (ref 80–100)
MONOCYTES ABSOLUTE: 0.3 K/UL (ref 0–1.3)
MONOCYTES RELATIVE PERCENT: 5.4 %
NEUTROPHILS ABSOLUTE: 2.6 K/UL (ref 1.7–7.7)
NEUTROPHILS RELATIVE PERCENT: 44.3 %
PDW BLD-RTO: 18.1 % (ref 12.4–15.4)
PLATELET # BLD: 339 K/UL (ref 135–450)
PMV BLD AUTO: 7.5 FL (ref 5–10.5)
RBC # BLD: 3.74 M/UL (ref 4.2–5.9)
SEDIMENTATION RATE, ERYTHROCYTE: 38 MM/HR (ref 0–15)
TOTAL PROTEIN: 6.7 G/DL (ref 6.4–8.2)
VITAMIN D 25-HYDROXY: 96.5 NG/ML
WBC # BLD: 5.8 K/UL (ref 4–11)

## 2022-02-17 PROCEDURE — G8484 FLU IMMUNIZE NO ADMIN: HCPCS | Performed by: INTERNAL MEDICINE

## 2022-02-17 PROCEDURE — G8427 DOCREV CUR MEDS BY ELIG CLIN: HCPCS | Performed by: INTERNAL MEDICINE

## 2022-02-17 PROCEDURE — 99215 OFFICE O/P EST HI 40 MIN: CPT | Performed by: INTERNAL MEDICINE

## 2022-02-17 PROCEDURE — 4004F PT TOBACCO SCREEN RCVD TLK: CPT | Performed by: INTERNAL MEDICINE

## 2022-02-17 PROCEDURE — G8420 CALC BMI NORM PARAMETERS: HCPCS | Performed by: INTERNAL MEDICINE

## 2022-02-17 RX ORDER — AZATHIOPRINE 50 MG/1
100 TABLET ORAL DAILY
Qty: 180 TABLET | Refills: 0 | Status: SHIPPED | OUTPATIENT
Start: 2022-02-17 | End: 2022-04-21 | Stop reason: SDUPTHER

## 2022-02-17 NOTE — ASSESSMENT & PLAN NOTE
- stable, following w/ Dermatology in [place]. - no evidence of SLE to date. - cont  mg daily.

## 2022-02-17 NOTE — ASSESSMENT & PLAN NOTE
- pt stated he complteted biweekly vitamin D supplement. - switch to daily vitamin D3 5000 IU. Repeat vitamin D level today. - cont calcium supplement.

## 2022-02-17 NOTE — ASSESSMENT & PLAN NOTE
- followed by GI, Dr. Zulema Rodríguez in [place]. - pt stated IBD Sx improved after switching MTX to AZA. Cont  mg daily. Pt denied any active GI Sx today. - discussed switching Cimzia to Stelara if repeat CRP remains elevated for better control of his ankylosing spondylitis. - discussed findings of small amount of pelvic free fluid seen on MRI L-spine from 10/2021, could be d/t ?IBD. Instructed pt to f/u w/ GI for further imaging if needed.

## 2022-02-17 NOTE — ASSESSMENT & PLAN NOTE
- remains active on Cimzia 400 mg SC Q4wk. CRP chronically elevated. Reviewed most recent MRI of L-spine and pelvis studies from 10/29/21 which showed active disease in the axial joints. - pt reported improvement in his IBD Sx after switching MTX to AZA in 11/2021. No active synovitis in the peripheral joints appreciated on exam today. - previously discussed w/ the pt and his GI regarding switching Cimzia to Stelara or [facility] [place] however pt declined to make any changes. He agreed to repeat his CRP today, if elevated will reach out to GI regarding switching to Stelara. - pt following w/ Pain Management in [place].

## 2022-02-18 ENCOUNTER — TELEPHONE (OUTPATIENT)
Dept: RHEUMATOLOGY | Age: 32
End: 2022-02-18

## 2022-02-18 NOTE — TELEPHONE ENCOUNTER
We are writing him Imuran 100 mg daily for his Crohn's disease and Cimzia for his ankylosing spondylitis. He's not on HCQ.

## 2022-02-18 NOTE — TELEPHONE ENCOUNTER
Eli from [place] [address] [facility] called. She wanted to review medications the pt was taking. Patient didn't know. Was able to clarify everything but if he was still on plaquenil. The medication is active in the list but alvin  only. Please advise when you return to the office.

## 2022-02-18 NOTE — RESULT ENCOUNTER NOTE
Please let pt know his inflammatory marker is improved but still elevated. I spoke to his GI physician, Dr. Sydney Green, about switching Cimzia to Stelara for better control of his arthritis. He needs to call Dr. Rodolfo Hinds office to schedule an appt as she will be prescribing this medicine. He should cont Cimzia until he gets Stelara approved. Continue taking Imuran. Vitamin D level on high side. I want him to HOLD vitamin D supplement.

## 2022-02-23 ENCOUNTER — TELEPHONE (OUTPATIENT)
Dept: RHEUMATOLOGY | Age: 32
End: 2022-02-23

## 2022-04-18 NOTE — PROGRESS NOTES
Gayathri Black MD  [facility]) Physicians - Rheumatology    [] [facility]:  [facility]  [address] [facility] [x] Rafael Trammell Office:  [address]   Office: [phone]  Fax: [phone]     RHEUMATOLOGY PROGRESS NOTE    ASSESSMENT/PLAN:  Ayden Curran is a 34 y.o. male w/ AS (+HLA B27, elevated inflammatory markers, chronic inflammatory back pain, MRI evidence of active enthesopathy b/l sacroiliitis w/ peripheral involvement, Hx of plantar fasciitis), Crohn's disease (follows w/ GI, Dr. Gera Garcia) and Bx proven discoid lupus (follows w/ Dermatologist, Dr. Monse Tavares).     PMHx pertinent for HTN, PTSD and anxiety.     Current rheum meds:  mg daily: started in 11/2021  Cimzia 400 mg SC Q4wk: started in 9/2020  Gabapentin 400 mg TID and Hydrocodone-Acetaminophen: managed by Pain Management Lynne Goodman)  Vitamin biweekly: per pt completed not currently taking any supplement  Calcium citrate 1000 mg daily  Clobetasol 0.05% ointment BID (per Dermatology)     Prior rheum meds:  MTX 20 mg/wk: started in 6/2020, switched to AZA in 11/2021 d/t active IBD  Humira 40 mg SC Q2wk: cleared by Derm and GI, took from 3/15/19 to 11/19, switched to Remicade d/t partial response, elevated CRP  Remicade 8 mg/kg IV Q6wk, received 1st dose on 12/30/19, developed infusion reaction during 5th infusion on 6/16/20  Mesalamine (GI): pt decided to d/c in 11/19  Meloxicam 7.5 mg daily: prescribed by Pain Management, instructed pt to d/c d/t IBD    1. Ankylosing spondylitis of multiple sites in spine [facility])  Assessment & Plan:  - remains active on Cimzia 400 mg SC Q4wk.  CRP chronically elevated, most recently improved on 2/17/22 but still elevated - pt felt he was not injecting Cimzia properly in the past. Most recent MRI of L-spine and pelvis studies from 10/29/21 showed active axial disease.  - discussed case w/ GI, plan is to switch Cimzia to Stelara for better control of his axial disease. Pending PA, will check w/ GI office.  - cont  mg daily. - pt following w/ Pain Management in [place]. Orders:  -     azaTHIOprine (IMURAN) 50 MG tablet; Take 2 tablets by mouth daily, Disp-180 tablet, R-0Normal  -     C-Reactive Protein; Future  -     Sedimentation Rate; Future  2. Crohn's disease without complication, unspecified gastrointestinal tract location [facility])  Assessment & Plan:  - followed by GI, Dr. Azalea Tesfaye in [place]. - pt stated IBD Sx improved after switching MTX to AZA. Cont  mg daily. Pt denied any active GI Sx today. - switch Cimzia to Stelara as above #1. Orders:  -     azaTHIOprine (IMURAN) 50 MG tablet; Take 2 tablets by mouth daily, Disp-180 tablet, R-0Normal  -     C-Reactive Protein; Future  -     Sedimentation Rate; Future  3. Discoid lupus  Assessment & Plan:  - stable, following w/ Dermatology in [place]. - no evidence of SLE to date. - cont  mg daily. Orders:  -     azaTHIOprine (IMURAN) 50 MG tablet; Take 2 tablets by mouth daily, Disp-180 tablet, R-0Normal  4. High risk medication use  Assessment & Plan:  - repeat safety labs for AZA now as pt lives out of town. - discussed his immunosuppressed state, previously discussed immunization. Orders:  -     Hepatic Function Panel; Future  -     CBC with Auto Differential; Future  -     Creatinine; Future     Return in about 2 months (around 6/21/2022) for lab result discussion and treatment plan, medication monitoring. TIME SPENT TODAY:  I spent over 40 minutes of face-to-face time with the pt (including taking interval history and performing physical exam, review of medical records, independently interpreting results and communicating results to the pt/family/caregiver, counseling and educating the pt/family/caregiver, implementation of treatment plan, coordination of care, documenting clinical information in the EMR) during today visit.   At least 50% of this time was spent in counseling, explanation of diagnosis, planning of further management, and coordination of care. The risks and benefits of my recommendations, as well as other treatment options, benefits and side effects were discussed with the patient today. Questions were answered. NOTE: This report is transcribed by using voice recognition software dragon. Every effort is made to ensure accuracy; however, inadvertent computerized  transcription errors may be present. SUBJECTIVE:  Past medical/surgical history, medications and allergies are reviewed and updated as appropriate. Interval Hx:   Pt states he took his most recent dose of Cimzia yesterday, he thinks he was previously injecting Cimzia \"into my blood\". He tells me his GI started PA process for [place]. He reports compliance w/  mg daily. He reports no change to his chronic inflammatory LBP. Denies any peripheral arthralgias, joint swelling or stiffness. No new discoid lesions. Denies new rash, oral or nasal ulcers, sicca, Raynaud's phenomenon, cough, SOB or CP. He reports well controlled IBD Sx. He tells me he has 1-2 non-bloody BM's per day. Denies abdominal cramping. Rheumatologic ROS:  Constitutional: denies fever/chills, night sweats, unintentional weight loss  Integumentary: +photosensitivity w/ chronic discoid lupus no new lesions and chronic diffuse alopecia, denies Sx of Raynaud's phenomenon  Eyes: denies dry eyes, redness or pain  Oral cavity: denies recent oral ulcers, dry mouth or thrush  Cardiovascular: denies CP, palpitations, Hx of pericardial effusion or pericarditis  Respiratory: denies SOB, cough, hemoptysis, or pleurisy  Gastrointestinal: IBD Sx as above HPI  Musculoskeletal:  refer to above HPI     No Known Allergies    Past Medical History:    No past medical history on file. Past Surgical History:    No past surgical history on file.     Medications:    Current Outpatient Medications   Medication Sig Dispense Refill    azaTHIOprine (IMURAN) 50 MG tablet Take 2 tablets by mouth daily 180 tablet 0    gabapentin (NEURONTIN) 300 MG capsule Take 300 mg by mouth 3 times daily.  fluocinolone 0.01 % cream Apply topically 2 times daily      hydrocortisone 0.5 % cream Apply topically 2 times daily Apply topically 2 times daily.  amLODIPine (NORVASC) 10 MG tablet Take 10 mg by mouth daily      ferrous sulfate 325 (65 Fe) MG tablet Take 325 mg by mouth daily (with breakfast)       CALCIUM CITRATE, BARIATRIC ADVANTAGE, 500MG LOZENGE Take 1 lozenge by mouth 2 times daily 270 lozenge 0    metoprolol tartrate (LOPRESSOR) 50 MG tablet Take 50 mg by mouth 2 times daily  (Patient not taking: Reported on 4/21/2022)      mesalamine (APRISO) 0.375 g extended release capsule Take 1.5 g by mouth daily  (Patient not taking: Reported on 4/21/2022)       No current facility-administered medications for this visit. OBJECTIVE:  Physical Exam:  /80   Pulse 74   Ht 5' 4\" (1.626 m)   Wt 125 lb (56.7 kg)   SpO2 97%   BMI 21.46 kg/m²     GEN: AAOx3, in NAD, well-appearing, accompanied by mother  HEAD: normocephalic, atraumatic  EYES: no injection or icterus  CVS: RRR  LUNGS: in no acute respiratory distress, CTAB  MSK:  Upper extremities:              Hands: no active synovitis or dactylitis, joints NTTP, full fist formation w/ strong  strength              Wrist: no synovitis in the wrist joints b/l, FROM  Lower extremities:              Knees: no warmth or effusion present, FROM              Ankles: no synovitis, FROM              Feet: no toe swelling or pain or warmth on palpation w/ FROM, negative MTP squeeze test  INTEGUMENT: hypopigmented scars and macules on face, inner ear, and arms, prior dry and scaly skin resolved, there is diffuse hair thinning, multiple tattoos, no petechiae, bruises, or palpable purpura    DATA:  Labs:   I personally reviewed interval labs and discussed w/ the pt in detail which showed:    Lab Results   Component Value Date    WBC 5.8 02/17/2022    HGB 11.4 (L) 02/17/2022    HCT 34.3 (L) 02/17/2022    MCV 91.8 02/17/2022     02/17/2022    LYMPHOPCT 47.3 02/17/2022    RBC 3.74 (L) 02/17/2022    MCH 30.6 02/17/2022    MCHC 33.3 02/17/2022    RDW 18.1 (H) 02/17/2022     Lab Results   Component Value Date     10/28/2021    K 4.2 10/28/2021     10/28/2021    CO2 26 10/28/2021    BUN 10 10/28/2021    CREATININE 0.9 02/17/2022    GLUCOSE 82 10/28/2021    CALCIUM 9.0 10/28/2021    PROT 6.7 02/17/2022    LABALBU 4.1 02/17/2022    BILITOT <0.2 02/17/2022    ALKPHOS 73 02/17/2022    AST 12 (L) 02/17/2022    ALT <5 (L) 02/17/2022    LABGLOM >60 02/17/2022    GFRAA >60 02/17/2022    AGRATIO 1.4 10/28/2021    GLOB 3.1 05/05/2020     Lab Results   Component Value Date    COLORU YELLOW 10/28/2021    CLARITYU CLOUDY (A) 10/28/2021    GLUCOSEU Negative 10/28/2021    BILIRUBINUR Negative 10/28/2021    KETUA Negative 10/28/2021    SPECGRAV 1.022 10/28/2021    BLOODU Negative 10/28/2021    PHUR 6.5 10/28/2021    PROTEINU Negative 10/28/2021    UROBILINOGEN 0.2 10/28/2021    NITRU Negative 10/28/2021    LEUKOCYTESUR Negative 10/28/2021    LABMICR YES 10/28/2021    URINETYPE NotGiven 10/28/2021    HYALCAST 0 10/28/2021    WBCUA 1 10/28/2021    RBCUA 1 10/28/2021    EPIU 0 10/28/2021     Lab Results   Component Value Date    VITD25 96.5 02/17/2022     Lab Results   Component Value Date    C3 131.8 10/28/2021    C3 158.1 07/17/2020    C3 138.4 08/21/2019    C3 150.2 02/20/2019     Lab Results   Component Value Date    C4 25.3 10/28/2021    C4 31.4 07/17/2020    C4 26.9 08/21/2019    C4 33.4 02/20/2019     Lab Results   Component Value Date    ANTIDSDNAIGG <1 10/28/2021    ANTIDSDNAIGG 1 08/21/2019      No results found for: [facility]     Lab Results   Component Value Date    CRP 17.8 (H) 02/17/2022    CRP 36.2 (H) 10/28/2021    CRP 57.8 (H) 08/26/2021    CRP 24.4 (H) 05/27/2021     Lab Results   Component Value Date    SEDRATE 38 (H) 02/17/2022    SEDRATE 29 (H) 10/28/2021    SEDRATE 60 (H) 05/05/2020    SEDRATE 28 (H) 02/26/2020     No results found for: CKTOTAL     Positive HLA-B27 (1/16/19)  [address] (10/9/18, 2/20/19, 7/17/20)  Negative SSA, SSB (1/16/19)  Negative hepatitis B and C serologies (1/16/19)  Negative Quantiferon TB (1/16/19) --> indeterminate (7/17/20, 7/23/20)    Imaging:  I personally reviewed interval imaging and discussed w/ the pt in detail which included:    X-ray L-spine (8/6/18): Severe sclerosis of the iliac side of both SI joints w/ serrated appearance of the SI joints. Findings are most c/w seronegative spondyloarthropathy (HLA B27 spondyloarthropathy), correlate clinically. Otherwise normal exam.     MRI C-spine (9/14/15): No signal abnormality within the visualized cord. Slight reversal of the normal cervical lordosis, nonspecific. No prevertebral soft tissue swelling. Vertebral body heights are well-maintained without acute fracture or osseous STIR signal abnormality. Preservation of the disc space heights. No listhesis. No lateralizing disc herniation or significant central canal, lateral recess, or neuroforaminal stenosis. Anterior and posterior longitudinal ligaments are intact. Ligamentum flavum intact. Occipitocervical ligaments intact. No interspinous widening.     MRI C-spine (3/6/19):  1. Extensive edema in the posterior elements of the lower C-spine extending from the level of C4-T1, L>R, as well as in the paraspinal, interspinous, and intercostal soft tissues likely related to active enthesopathy related to the pt's Hx of AS. 2. Mild central spinal canal narrowing at C3-C4, through C6-C7.      MRI T-spine (9/14/15): IMPRESSION:   No cord signal abnormality, marrow edema, lateralizing disc herniation, or significant stenosis.     MRI L-spine (9/14/15): IMPRESSION:   1. No marrow edema, lateralizing disc herniation, or significant stenosis.  Mild stenosis, as detailed above.  2. Sclerosis and erosion involving the visualized portion of the SI joints, L greater than R, new from the 2006 MRI, present and better seen on the CT performed August 13, 2014. DDx includes IBD, inflammatory arthropathy (psoriatic arthritis, reactive arthritis), etc.     MRI L-spine (8/14/19, ordered per Pain Management request):  Mild-to-moderate multilevel facet arthropathy, otherwise unremarkable MRI of the lumbar spine. No significant neural foraminal or spinal canal stenosis. MRI L-spine (10/29/21):  RESULT:   Counting reference:  Lumbosacral junction. For the purposes of this report,  L4-5 is considered the level of the iliac crest and assume there are 5 lumbar-type vertebrae. Anatomic variant:  None. Localizer images:  No additional findings. Alignment:    Alignment is anatomic. Bone marrow signal/fracture:  No evidence of pathologic marrow infiltration. No evidence of prior fracture. There is very mild edema along the anterior superior endplates of L5 and L4 vertebral bodies, more prominent at L5. There is minimal intervertebral disc space height loss at L4-L5. There is increased STIR signal intensity within the right pedicle of T12 vertebral body which appears to extend into the right posterior 12th rib. There is also mild pedicular edema at L3 and L4 levels bilaterally. Conus: The conus is within normal limits of signal intensity and   morphology. Paraspinal soft tissues:   Paraspinal soft tissues are within normal limits. Lower thoracic spine:  Visualized lower thoracic canal and foramina are patent. T12-L1:  There is moderate facet joint arthropathy with causes moderate bilateral neural foraminal stenosis. There is no narrowing of the spinal canal.   L1-L2:    Canal and foramina are patent. Mild facet joint arthropathy is seen. L2-L3:    Canal and foramina are patent. Moderate bilateral facet joint   arthropathy is seen.    L3-L4:    There is a central disc protrusion that impresses on the thecal sac and is moderate facet joint arthropathy with slight narrowing of the lateral recesses and mild narrowing of the spinal canal.  There is   moderate bilateral neural foraminal stenosis, slightly worse on the left due to disc bulge and facet joint arthropathy. Mild bilateral facet joint arthropathy is seen. L4-L5:    There is moderate bilateral facet joint arthropathy which causes mild bilateral neural foraminal narrowing. There is no narrowing of the spinal canal.   L5-S1:    Canal and foramina are patent   Sacrum and iliac wings:   Fatty marrow changes are noted along the sacroiliac joints bilaterally. Partially visualized is increased STIR signal intensity within the pelvis as may represent pelvic free fluid. IMPRESSION:   1. There is mild edema along the anterior superior endplates of L4 and L5 vertebra. This finding could represent early osteitis/Romanus lesions with ankylosing spondylitis. 2.  At L4-L5 there is a disc herniation that causes mild narrowing of the spinal canal.  Moderate bilateral neural foraminal narrowing is also seen at this level. 3.  At T12-L1 there is moderate bilateral neural foraminal stenosis due to facet joint arthropathy. 4.  Pedicular edema at T12, L3, and L4 may relate to stress reaction or inflammatory change. 5.  Small amount of pelvic free fluid. If not already performed, a CT of the abdomen and pelvis can be considered. Anatomic Thoracic/Lumbar Variant: None. L4-5 is considered the level of the iliac crest and assume there are 5 lumbar-type vertebrae. MRI pelvis (10/29/21):  COMPARISON: 09/12/2015   TECHNIQUE: T1 weighted and inversion recovery coronal, T1 and fat suppression T2-weighted axial and T1 weighted sagittal imaging of the pelvis and fat suppression T2-weighted coronal oblique imaging of the sacrum were performed.    RESULT:   Bone marrow: Again demonstrated is diffuse marrow signal abnormality about both sacroiliac joints suggesting chronic sacroiliitis. Hip joints: Within normal limits. A physiologic amount of fluid is present within both hip joints. There is no evidence of a labral tear nor paralabral cyst.   Sacroiliac joints: Within normal limits. Pubic symphysis: Within normal limits. Tendons: Within normal limits including the iliopsoas, hamstring, gluteal   and rectus femoris tendons. Muscles: Within normal limits. IMPRESSION:   1. BILATERAL SACROILIITIS WHICH APPEARS CHRONIC. A SERONEGATIVE SPONDYLOARTHROPATHY SUCH AS ANKYLOSING SPONDYLITIS SHOULD BE CONSIDERED.      Procedures:   Note from GI (Dr. Eliezer Mascorro) from 8/17/18:  \"Pt w/ CD, R sided colitis at last c-scope in 2011. [facility] has been noncompliant with meds and has not followed up with us until recently. .. Restarted Apriso 1 month back\"     C-scope from 8/17/18:  \"There was erosion, friability, granularity, linear ulcer, loss of vascular marking noted in the colon.  There was stigmata bleeding noted.  Cold forceps biopsies were obtained for the purpose of histology\".  Random colon Bx showed \"focal active colitis, negative for dysplasia\". Above results were discussed w/ the pt in detail during today's visit.

## 2022-04-20 ENCOUNTER — TELEPHONE (OUTPATIENT)
Dept: ADMINISTRATIVE | Age: 32
End: 2022-04-20

## 2022-04-20 NOTE — TELEPHONE ENCOUNTER
[employer] requesting a PA for Cimzia. No script in the chart.  Looks like the patient is no longer using this

## 2022-04-20 NOTE — TELEPHONE ENCOUNTER
Patient was taking Cimzia per his GI  I called patient left message to call back to see if he is still on Cimzia or [employer]

## 2022-04-21 ENCOUNTER — TELEPHONE (OUTPATIENT)
Dept: RHEUMATOLOGY | Age: 32
End: 2022-04-21

## 2022-04-21 ENCOUNTER — OFFICE VISIT (OUTPATIENT)
Dept: RHEUMATOLOGY | Age: 32
End: 2022-04-21
Payer: MEDICARE

## 2022-04-21 VITALS
DIASTOLIC BLOOD PRESSURE: 80 MMHG | OXYGEN SATURATION: 97 % | SYSTOLIC BLOOD PRESSURE: 128 MMHG | HEIGHT: 64 IN | WEIGHT: 125 LBS | HEART RATE: 74 BPM | BODY MASS INDEX: 21.34 KG/M2

## 2022-04-21 DIAGNOSIS — M45.0 ANKYLOSING SPONDYLITIS OF MULTIPLE SITES IN SPINE (HCC): ICD-10-CM

## 2022-04-21 DIAGNOSIS — Z79.899 HIGH RISK MEDICATION USE: ICD-10-CM

## 2022-04-21 DIAGNOSIS — L93.0 DISCOID LUPUS: ICD-10-CM

## 2022-04-21 DIAGNOSIS — M45.0 ANKYLOSING SPONDYLITIS OF MULTIPLE SITES IN SPINE (HCC): Primary | ICD-10-CM

## 2022-04-21 DIAGNOSIS — K50.90 CROHN'S DISEASE WITHOUT COMPLICATION, UNSPECIFIED GASTROINTESTINAL TRACT LOCATION (HCC): ICD-10-CM

## 2022-04-21 PROBLEM — E55.9 VITAMIN D DEFICIENCY: Chronic | Status: RESOLVED | Noted: 2019-02-20 | Resolved: 2022-04-21

## 2022-04-21 PROBLEM — Z51.81 ENCOUNTER FOR THERAPEUTIC DRUG MONITORING: Status: RESOLVED | Noted: 2021-10-28 | Resolved: 2022-04-21

## 2022-04-21 LAB
ALBUMIN SERPL-MCNC: 3.9 G/DL (ref 3.4–5)
ALP BLD-CCNC: 67 U/L (ref 40–129)
ALT SERPL-CCNC: <5 U/L (ref 10–40)
AST SERPL-CCNC: 11 U/L (ref 15–37)
BASOPHILS ABSOLUTE: 0 K/UL (ref 0–0.2)
BASOPHILS RELATIVE PERCENT: 0.4 %
BILIRUB SERPL-MCNC: <0.2 MG/DL (ref 0–1)
BILIRUBIN DIRECT: <0.2 MG/DL (ref 0–0.3)
BILIRUBIN, INDIRECT: ABNORMAL MG/DL (ref 0–1)
C-REACTIVE PROTEIN: 11.5 MG/L (ref 0–5.1)
CREAT SERPL-MCNC: 0.9 MG/DL (ref 0.9–1.3)
EOSINOPHILS ABSOLUTE: 0.1 K/UL (ref 0–0.6)
EOSINOPHILS RELATIVE PERCENT: 1 %
GFR AFRICAN AMERICAN: >60
GFR NON-AFRICAN AMERICAN: >60
HCT VFR BLD CALC: 34.3 % (ref 40.5–52.5)
HEMOGLOBIN: 11.6 G/DL (ref 13.5–17.5)
LYMPHOCYTES ABSOLUTE: 2.6 K/UL (ref 1–5.1)
LYMPHOCYTES RELATIVE PERCENT: 43.3 %
MCH RBC QN AUTO: 31.9 PG (ref 26–34)
MCHC RBC AUTO-ENTMCNC: 33.8 G/DL (ref 31–36)
MCV RBC AUTO: 94.6 FL (ref 80–100)
MONOCYTES ABSOLUTE: 0.3 K/UL (ref 0–1.3)
MONOCYTES RELATIVE PERCENT: 5.4 %
NEUTROPHILS ABSOLUTE: 3 K/UL (ref 1.7–7.7)
NEUTROPHILS RELATIVE PERCENT: 49.9 %
PDW BLD-RTO: 16.5 % (ref 12.4–15.4)
PLATELET # BLD: 325 K/UL (ref 135–450)
PMV BLD AUTO: 7.5 FL (ref 5–10.5)
RBC # BLD: 3.63 M/UL (ref 4.2–5.9)
SEDIMENTATION RATE, ERYTHROCYTE: 32 MM/HR (ref 0–15)
TOTAL PROTEIN: 6.2 G/DL (ref 6.4–8.2)
WBC # BLD: 6.1 K/UL (ref 4–11)

## 2022-04-21 PROCEDURE — G8420 CALC BMI NORM PARAMETERS: HCPCS | Performed by: INTERNAL MEDICINE

## 2022-04-21 PROCEDURE — 99214 OFFICE O/P EST MOD 30 MIN: CPT | Performed by: INTERNAL MEDICINE

## 2022-04-21 PROCEDURE — G8427 DOCREV CUR MEDS BY ELIG CLIN: HCPCS | Performed by: INTERNAL MEDICINE

## 2022-04-21 PROCEDURE — 4004F PT TOBACCO SCREEN RCVD TLK: CPT | Performed by: INTERNAL MEDICINE

## 2022-04-21 RX ORDER — AZATHIOPRINE 50 MG/1
100 TABLET ORAL DAILY
Qty: 180 TABLET | Refills: 0 | Status: SHIPPED | OUTPATIENT
Start: 2022-04-21 | End: 2022-06-30 | Stop reason: SDUPTHER

## 2022-04-21 NOTE — TELEPHONE ENCOUNTER
I called [facility] Gastroenterology office PH# [phone] office was already closed. Will call back Monday. To see if Dr Roldan Mcneal has ordered [facility] IV for patient.    Patient has taken his last Orencia injection yesterday 4/20/22  We needs to get this approved soon as possible

## 2022-04-21 NOTE — ASSESSMENT & PLAN NOTE
- repeat safety labs for AZA now as pt lives out of town. - discussed his immunosuppressed state, previously discussed immunization.

## 2022-04-21 NOTE — ASSESSMENT & PLAN NOTE
- remains active on Cimzia 400 mg SC Q4wk. CRP chronically elevated, most recently improved on 2/17/22 but still elevated - pt felt he was not injecting Cimzia properly in the past. Most recent MRI of L-spine and pelvis studies from 10/29/21 showed active axial disease.  - discussed case w/ GI, plan is to switch Cimzia to Stelara for better control of his axial disease. Pending PA, will check w/ GI office.  - cont  mg daily. - pt following w/ Pain Management in [place].

## 2022-04-21 NOTE — ASSESSMENT & PLAN NOTE
- followed by GI, Dr. Arnold Lang in [place]. - pt stated IBD Sx improved after switching MTX to AZA. Cont  mg daily. Pt denied any active GI Sx today. - switch Cimzia to Stelara as above #1.

## 2022-04-22 NOTE — RESULT ENCOUNTER NOTE
Please let pt know his inflammatory marker (CRP) continues to improve, it's only mildly elevated now whereas it was very elevated several months ago. He had told me that he was not injecting Cimzia correctly until recently. I will leave it up to him and his GI if they want to switch Cimzia to Stelara for better control of his inflammatory arthritis.

## 2022-04-25 NOTE — TELEPHONE ENCOUNTER
Called GI office and left message for the MA who works with Dr Larisa Beltran- regarding ordering the [address] IV for patient.

## 2022-05-11 ENCOUNTER — TELEPHONE (OUTPATIENT)
Dept: RHEUMATOLOGY | Age: 32
End: 2022-05-11

## 2022-05-11 NOTE — TELEPHONE ENCOUNTER
I called spoke with patient's mom and patient was there in back ground. Patient gets confused at times on what medications he is on. Patient is not on Humira, stopped Orencia injections. He is waiting on a call back from his GI Dr Ruiz Bingham office to call him on getting him started on Stelera IV infusions. I called the office PH# [phone] 4/25/22 left a detailed message on patient. I just called office again spoke to an Wilfrid Schilder, I was transferred to Sue BRYAN voicemail again. So I left same detailed message and for her to please give call on.

## 2022-05-11 NOTE — TELEPHONE ENCOUNTER
Patient called back returning a call. I asked him for more information about the below message left today. He reports he did not call today.

## 2022-05-11 NOTE — TELEPHONE ENCOUNTER
Received a call back from [facility] for Dr Sydney Green. States the Dr has submitted the PA for the [employer] and waiting on the insurance. Soon as they receive information they will contact patient. Called and spoke with patient-he is aware the PA for [facility] is in process and Dr Cole Mata office will call him when get notification from his insurance.

## 2022-05-19 ENCOUNTER — TELEPHONE (OUTPATIENT)
Dept: RHEUMATOLOGY | Age: 32
End: 2022-05-19

## 2022-05-19 NOTE — TELEPHONE ENCOUNTER
Patient's mother called in wanting to know what can be given for pain? She states that they are still waiting on the PA from Dr. Zane Jean office. Or can he go back on Humira or Orencia. Mom states that she called Dr. Zane Jean office and they were advised the PA was still in process. Please advise.

## 2022-05-23 RX ORDER — PREDNISONE 1 MG/1
TABLET ORAL
Qty: 94 TABLET | Refills: 0 | Status: SHIPPED | OUTPATIENT
Start: 2022-05-23 | End: 2022-06-26

## 2022-05-23 NOTE — TELEPHONE ENCOUNTER
GI office Ph# [phone] back,   Message will be given to  Malena Mode. She is out today. Infinia Mode will return our call on the status.

## 2022-05-23 NOTE — TELEPHONE ENCOUNTER
Called spoke with patient's mom Uli Albarran, she will tell PT to get the Prednisone today from his Sylvester Andrade  Then wait on our office or his GI office to call him back regarding the PA for [address] IV    She states understanding

## 2022-05-23 NOTE — TELEPHONE ENCOUNTER
Spoke with patient's mom states patient still hasn't heard from his GI Dr Radha Darby office PH# [phone] regarding the PA on Stelera IV. I called GI office and left message with GI RANDI Peterson to return my call     Patient is in a lot of pain and would like Dr Kalia Blankenship to send in Prednisone to help until the Cindi Sharif goes through. Patient is aware Dr Kalia Blankenship is out till tomorrow Tues.  5/24/22

## 2022-05-25 NOTE — TELEPHONE ENCOUNTER
Called spoke with patient-states he started the Prednisone yesterday and was up all night with his stomach hurting-states his crohn's is flaring up. States he will try to take it again today. Wants to know is there anything else he can take that is easier on his stomach. He still hasn't heard from his GI office yet. Called and spoke with  Michelle Richard states still in process, was sent to speak with Bobby Middleton is working on the PA process. I left message on how the process is going for the PA on Stelera IV. To return call back.

## 2022-05-25 NOTE — TELEPHONE ENCOUNTER
No he is out of Cimzia. Not sure how long the PA is going to take and then getting in for Infusion.      He said he needs something to help, he's having a bad flare

## 2022-05-25 NOTE — TELEPHONE ENCOUNTER
Called spoke with patient-he will give the Prednisone time to help.  He will call hi GI office to see what they can offer for his flare up

## 2022-05-25 NOTE — TELEPHONE ENCOUNTER
Please have him give Prednisone more time.  Please have him call his GI office for his GI Sx - they should be able to prescribe him medicine for his Sx.

## 2022-05-26 ENCOUNTER — TELEPHONE (OUTPATIENT)
Dept: RHEUMATOLOGY | Age: 32
End: 2022-05-26

## 2022-05-26 NOTE — TELEPHONE ENCOUNTER
I have already spoke with the patient regarding his medication and states understanding on continuing taking the Prednisone and call his GI on his Lupus Flare.

## 2022-05-26 NOTE — TELEPHONE ENCOUNTER
Patients mother called for pt. She reports she was speaking with the pt the \"other\" day and the medication  prescribed for him is irritating his stomach. She requested this message be sent to the MD. She would like to speak with the MD about this. There are multiple messages and the pt spoke with the office yesterday about this issue. Informed pt's mother that the office addressed this with him yesterday.

## 2022-06-08 ENCOUNTER — TELEPHONE (OUTPATIENT)
Dept: RHEUMATOLOGY | Age: 32
End: 2022-06-08

## 2022-06-08 NOTE — TELEPHONE ENCOUNTER
----- Message from Jamila Barnhart sent at 3/3/2017 12:35 PM CST -----  Contact: self   Patient would like to get medical advice.  Symptoms (please be specific): congestion, pressure to eye's    How long has patient had these symptoms:  3 days   Pharmacy name and phone #:  [id number] - [phone] (Phone)  [phone] (Fax)  Any drug allergies:    Comments:      Called spoke with patient is aware to finish the Prednisone taper. States he started the Stelara IV today. Patient just wanted to make sure was ok to finish the Prednisone.  Pt aware

## 2022-06-08 NOTE — TELEPHONE ENCOUNTER
Pt's mother calling to ask if pt still needs to take the steroid along with the Stelara? Best contact is [phone] or [phone].

## 2022-06-09 NOTE — TELEPHONE ENCOUNTER
Patient's mother called today asking about tapering the steroid. I gave her the information and told her Crystal had reached the patient yesterday.

## 2022-06-27 NOTE — PROGRESS NOTES
Stephane Acosta MD  [facility] Physicians - Rheumatology    [] [facility]:  [facility] [address] [place] [address] [x] [facility] Office:  [address] [facility] [address] [facility]   Office: [phone]  Fax: [phone]     RHEUMATOLOGY PROGRESS NOTE    ASSESSMENT/PLAN:  Nadiya Amezquita is a 34 y.o. male w/ AS (+HLA B27, elevated inflammatory markers, chronic inflammatory back pain, MRI evidence of active enthesopathy b/l sacroiliitis w/ peripheral involvement, Hx of plantar fasciitis), Crohn's disease (follows w/ GI, Dr. Felicitas Jhaveri) and Bx proven discoid lupus (follows w/ Dermatologist, Dr. Rodolfo Tavares).     PMHx pertinent for HTN, PTSD and anxiety.     Current rheum meds:  mg daily: started in 11/2021  Ustekinumab 90 mg SC Q8wk: per GI, received induction dose in 5/2022  Gabapentin 400 mg TID and Hydrocodone-Acetaminophen: managed by Pain Management Mayela Pope)  Calcium citrate 1000 mg daily  Clobetasol 0.05% ointment BID (per Dermatology)     Prior rheum meds:  MTX 20 mg/wk: started in 6/2020, switched to AZA in 11/2021 d/t active IBD  Adalimumab 40 mg SC Q2wk: cleared by Derm and GI, took from 3/15/19 to 11/19, switched to Remicade d/t partial response, elevated CRP  Infliximab 8 mg/kg IV Q6wk, received 1st dose on 12/30/19, developed infusion reaction during 5th infusion on 5/32/01  Certolizumab 293 mg SC Q4wk: took from 9/2020-5/2022, switched to   Mesalamine (GI): pt decided to d/c in 11/19  Meloxicam 7.5 mg daily: prescribed by Pain Management, instructed pt to d/c d/t IBD    1. Ankylosing spondylitis of multiple sites in spine [facility])  Assessment & Plan:  - previously improved but remained active on Certolizumab 057 mg SC Q4wk. CRP improved but still remained mildly elevated on Certolizumab. Most recent MRI of L-spine and pelvis studies from 10/29/21 showed active axial disease. There was an issue w/ possible injection error w/ Certolizumab.  He was switched to Ustekinumab in 5/2022, pt stated he received his IV induction dose at his GI office. He told me he is scheduled to start 90 mg SC Q8wk next month. - cont Ustekinumab 90 mg SC Q8wk per GI, will try this for at least 6 months. - noncompliant w/ AZA, discussed importance of taking this for his IBD related arthropathy, IBD and discoid lupus. Resume 100 mg daily. - he will take Prednisone tapers PRN for inflammatory back pain and IBD flares until Ustekinumab takes into full effect. - pt following w/ Pain Management in [place]. - pt will call our office for external referral to outside aquatic PT facility. Orders:  -     azaTHIOprine (IMURAN) 50 MG tablet; Take 2 tablets by mouth daily, Disp-180 tablet, R-0Normal  -     C-Reactive Protein; Future  -     Sedimentation Rate; Future  2. Crohn's disease without complication, unspecified gastrointestinal tract location [facility])  Assessment & Plan:  - followed by GI, Dr. Sydney Green in [place]. - pt stated IBD Sx improved after switching MTX to AZA. - resume  mg daily and cont Ustekinumab as above #1. Orders:  -     azaTHIOprine (IMURAN) 50 MG tablet; Take 2 tablets by mouth daily, Disp-180 tablet, R-0Normal  -     C-Reactive Protein; Future  -     Sedimentation Rate; Future  -     CALCIUM CITRATE, BARIATRIC ADVANTAGE, 500MG LOZENGE; Take 1 lozenge by mouth 2 times daily, Disp-270 lozenge, R-0Normal  3. Discoid lupus  Assessment & Plan:  - stable, following w/ Dermatology in [place]. - no evidence of SLE to date. - resume  mg daily. Orders:  -     azaTHIOprine (IMURAN) 50 MG tablet; Take 2 tablets by mouth daily, Disp-180 tablet, R-0Normal  4. Vitamin D deficiency  -     CALCIUM CITRATE, BARIATRIC ADVANTAGE, 500MG LOZENGE; Take 1 lozenge by mouth 2 times daily, Disp-270 lozenge, R-0Normal  5. High risk medication use  Assessment & Plan:  - discussed his immunosuppressed state and indications to hold AZA and Ustekinumab, previously discussed immunization.   - he will be due for safety labs for AZA in 3 wks time. Orders:  -     Hepatic Function Panel; Future  -     CBC with Auto Differential; Future  -     Creatinine; Future     Return in about 3 months (around 9/30/2022) for lab result discussion and treatment plan, medication monitoring. High complexity case. TIME SPENT TODAY:  I spent over 40 minutes of face-to-face time with the pt (including taking interval history and performing physical exam, review of medical records, independently interpreting results and communicating results to the pt/family/caregiver, counseling and educating the pt/family/caregiver, implementation of treatment plan, coordination of care, documenting clinical information in the EMR) during today visit. At least 50% of this time was spent in counseling, explanation of diagnosis, planning of further management, and coordination of care. The risks and benefits of my recommendations, as well as other treatment options, benefits and side effects were discussed with the patient today. Questions were answered. NOTE: This report is transcribed by using voice recognition software dragon. Every effort is made to ensure accuracy; however, inadvertent computerized  transcription errors may be present. SUBJECTIVE:  Past medical/surgical history, medications and allergies are reviewed and updated as appropriate. Interval Hx:   Pt states he received his IV induction dose of Ustekinumab sometime in May. He reports worsening inflammatory back pain and stiffness after he ran out of Certolizumab sometime in April/May. He reports good pain relief from Prednisone taper which he tapered off two days ago. He recalls having active IBD Sx prior to starting Prednisone. Skin also started to itch lately. He ran out of AZA \"a while\" ago. He denies any Sx of IBD currently. Denies any peripheral arthralgias, joint swelling or stiffness. No new discoid lesions.  Denies new rash, oral or nasal ulcers, sicca, Raynaud's phenomenon. He is wondering if he should start exercising for his back. He would like to learn how to swim. Rheumatologic ROS:  Constitutional: denies fever/chills, night sweats, unintentional weight loss  Integumentary: +photosensitivity w/ chronic discoid lupus no new lesions and chronic diffuse alopecia, denies Sx of Raynaud's phenomenon  Eyes: denies dry eyes, redness or pain  Oral cavity: denies recent oral ulcers, dry mouth or thrush  Cardiovascular: denies CP, palpitations, Hx of pericardial effusion or pericarditis  Respiratory: denies SOB, cough, hemoptysis, or pleurisy  Gastrointestinal: IBD Sx as above HPI  Musculoskeletal:  refer to above HPI     Allergies   Allergen Reactions    Remicade [Infliximab] Other (See Comments)       Past Medical History:    No past medical history on file. Past Surgical History:    No past surgical history on file. Medications:    Current Outpatient Medications   Medication Sig Dispense Refill    azaTHIOprine (IMURAN) 50 MG tablet Take 2 tablets by mouth daily 180 tablet 0    predniSONE (DELTASONE) 5 MG tablet Take 6 tablets by mouth every morning for 4 days, THEN 4 tablets every morning for 4 days, THEN 2 tablets every morning for 4 days, THEN 1 tablet every morning for 4 days. 52 tablet 2    CALCIUM CITRATE, BARIATRIC ADVANTAGE, 500MG LOZENGE Take 1 lozenge by mouth 2 times daily 270 lozenge 0    gabapentin (NEURONTIN) 300 MG capsule Take 300 mg by mouth 3 times daily.  fluocinolone 0.01 % cream Apply topically 2 times daily      hydrocortisone 0.5 % cream Apply topically 2 times daily Apply topically 2 times daily.  amLODIPine (NORVASC) 10 MG tablet Take 10 mg by mouth daily      ferrous sulfate 325 (65 Fe) MG tablet Take 325 mg by mouth daily (with breakfast)  (Patient not taking: Reported on 6/30/2022)       No current facility-administered medications for this visit.         OBJECTIVE:  Physical Exam:  /80 (Site: Left Upper Arm, Position: Sitting, Cuff Size: Medium Adult)   Pulse 70   Wt 126 lb (57.2 kg)   BMI 21.63 kg/m²     GEN: AAOx3, in NAD, well-appearing, accompanied by mother  HEAD: normocephalic, atraumatic  EYES: no injection or icterus  CVS: RRR  LUNGS: in no acute respiratory distress, CTAB  MSK:  Upper extremities:              Hands: no active synovitis or dactylitis, joints NTTP, full fist formation w/ strong  strength              Wrist: no synovitis in the wrist joints b/l, FROM  Lower extremities:              Knees: no warmth or effusion present, FROM              Ankles: no synovitis, FROM              Feet: no toe swelling or pain or warmth on palpation w/ FROM, negative MTP squeeze test  INTEGUMENT: hypopigmented scars and macules on face, inner ear, and arms, prior dry and scaly skin resolved, there is diffuse hair thinning, multiple tattoos, no petechiae, bruises, or palpable purpura    DATA:  Labs:   I personally reviewed interval labs and discussed w/ the pt in detail which showed:    Lab Results   Component Value Date    WBC 6.1 04/21/2022    HGB 11.6 (L) 04/21/2022    HCT 34.3 (L) 04/21/2022    MCV 94.6 04/21/2022     04/21/2022    LYMPHOPCT 43.3 04/21/2022    RBC 3.63 (L) 04/21/2022    MCH 31.9 04/21/2022    MCHC 33.8 04/21/2022    RDW 16.5 (H) 04/21/2022     Lab Results   Component Value Date     10/28/2021    K 4.2 10/28/2021     10/28/2021    CO2 26 10/28/2021    BUN 10 10/28/2021    CREATININE 0.9 04/21/2022    GLUCOSE 82 10/28/2021    CALCIUM 9.0 10/28/2021    PROT 6.2 (L) 04/21/2022    LABALBU 3.9 04/21/2022    BILITOT <0.2 04/21/2022    ALKPHOS 67 04/21/2022    AST 11 (L) 04/21/2022    ALT <5 (L) 04/21/2022    LABGLOM >60 04/21/2022    GFRAA >60 04/21/2022    AGRATIO 1.4 10/28/2021    GLOB 3.1 05/05/2020     Lab Results   Component Value Date    COLORU YELLOW 10/28/2021    CLARITYU CLOUDY (A) 10/28/2021    GLUCOSEU Negative 10/28/2021    BILIRUBINUR Negative 10/28/2021    KETUA Negative 10/28/2021    SPECGRAV 1.022 10/28/2021    BLOODU Negative 10/28/2021    PHUR 6.5 10/28/2021    PROTEINU Negative 10/28/2021    UROBILINOGEN 0.2 10/28/2021    NITRU Negative 10/28/2021    LEUKOCYTESUR Negative 10/28/2021    LABMICR YES 10/28/2021    URINETYPE NotGiven 10/28/2021    HYALCAST 0 10/28/2021    WBCUA 1 10/28/2021    RBCUA 1 10/28/2021    EPIU 0 10/28/2021     Lab Results   Component Value Date    VITD25 96.5 02/17/2022     Lab Results   Component Value Date    C3 131.8 10/28/2021    C3 158.1 07/17/2020    C3 138.4 08/21/2019    C3 150.2 02/20/2019     Lab Results   Component Value Date    C4 25.3 10/28/2021    C4 31.4 07/17/2020    C4 26.9 08/21/2019    C4 33.4 02/20/2019     Lab Results   Component Value Date    ANTIDSDNAIGG <1 10/28/2021    ANTIDSDNAIGG 1 08/21/2019      No results found for: [facility]     Lab Results   Component Value Date    CRP 11.5 (H) 04/21/2022    CRP 17.8 (H) 02/17/2022    CRP 36.2 (H) 10/28/2021    CRP 57.8 (H) 08/26/2021     Lab Results   Component Value Date    SEDRATE 32 (H) 04/21/2022    SEDRATE 38 (H) 02/17/2022    SEDRATE 29 (H) 10/28/2021    SEDRATE 60 (H) 05/05/2020     No results found for: CKTOTAL     Positive HLA-B27 (1/16/19)  [address] (10/9/18, 2/20/19, 7/17/20)  Negative SSA, SSB (1/16/19)  Negative hepatitis B and C serologies (1/16/19)  Negative Quantiferon TB (1/16/19) --> indeterminate (7/17/20, 7/23/20)    Imaging:  I personally reviewed interval imaging and discussed w/ the pt in detail which included:    X-ray L-spine (8/6/18): Severe sclerosis of the iliac side of both SI joints w/ serrated appearance of the SI joints. Findings are most c/w seronegative spondyloarthropathy (HLA B27 spondyloarthropathy), correlate clinically. Otherwise normal exam.     MRI C-spine (9/14/15): No signal abnormality within the visualized cord. Slight reversal of the normal cervical lordosis, nonspecific. No prevertebral soft tissue swelling. Vertebral body heights are well-maintained without acute fracture or osseous STIR signal abnormality. Preservation of the disc space heights. No listhesis. No lateralizing disc herniation or significant central canal, lateral recess, or neuroforaminal stenosis. Anterior and posterior longitudinal ligaments are intact. Ligamentum flavum intact. Occipitocervical ligaments intact. No interspinous widening.     MRI C-spine (3/6/19):  1. Extensive edema in the posterior elements of the lower C-spine extending from the level of C4-T1, L>R, as well as in the paraspinal, interspinous, and intercostal soft tissues likely related to active enthesopathy related to the pt's Hx of AS. 2. Mild central spinal canal narrowing at C3-C4, through C6-C7.      MRI T-spine (9/14/15): IMPRESSION:   No cord signal abnormality, marrow edema, lateralizing disc herniation, or significant stenosis.     MRI L-spine (9/14/15): IMPRESSION:   1. No marrow edema, lateralizing disc herniation, or significant stenosis. Mild stenosis, as detailed above. 2. Sclerosis and erosion involving the visualized portion of the SI joints, L greater than R, new from the 2006 MRI, present and better seen on the CT performed August 13, 2014. DDx includes IBD, inflammatory arthropathy (psoriatic arthritis, reactive arthritis), etc.     MRI L-spine (8/14/19, ordered per Pain Management request):  Mild-to-moderate multilevel facet arthropathy, otherwise unremarkable MRI of the lumbar spine. No significant neural foraminal or spinal canal stenosis. MRI L-spine (10/29/21):  RESULT:   Counting reference:  Lumbosacral junction. For the purposes of this report,  L4-5 is considered the level of the iliac crest and assume there are 5 lumbar-type vertebrae. Anatomic variant:  None. Localizer images:  No additional findings. Alignment:    Alignment is anatomic. Bone marrow signal/fracture:  No evidence of pathologic marrow infiltration.   No evidence of prior fracture. There is very mild edema along the anterior superior endplates of L5 and L4 vertebral bodies, more prominent at L5. There is minimal intervertebral disc space height loss at L4-L5. There is increased STIR signal intensity within the right pedicle of T12 vertebral body which appears to extend into the right posterior 12th rib. There is also mild pedicular edema at L3 and L4 levels bilaterally. Conus: The conus is within normal limits of signal intensity and   morphology. Paraspinal soft tissues:   Paraspinal soft tissues are within normal limits. Lower thoracic spine:  Visualized lower thoracic canal and foramina are patent. T12-L1:  There is moderate facet joint arthropathy with causes moderate bilateral neural foraminal stenosis. There is no narrowing of the spinal canal.   L1-L2:    Canal and foramina are patent. Mild facet joint arthropathy is seen. L2-L3:    Canal and foramina are patent. Moderate bilateral facet joint   arthropathy is seen. L3-L4:    There is a central disc protrusion that impresses on the thecal sac and is moderate facet joint arthropathy with slight narrowing of the lateral recesses and mild narrowing of the spinal canal.  There is   moderate bilateral neural foraminal stenosis, slightly worse on the left due to disc bulge and facet joint arthropathy. Mild bilateral facet joint arthropathy is seen. L4-L5:    There is moderate bilateral facet joint arthropathy which causes mild bilateral neural foraminal narrowing. There is no narrowing of the spinal canal.   L5-S1:    Canal and foramina are patent   Sacrum and iliac wings:   Fatty marrow changes are noted along the sacroiliac joints bilaterally. Partially visualized is increased STIR signal intensity within the pelvis as may represent pelvic free fluid. IMPRESSION:   1. There is mild edema along the anterior superior endplates of L4 and L5 vertebra.   This finding could represent early osteitis/Romanus lesions with ankylosing spondylitis. 2.  At L4-L5 there is a disc herniation that causes mild narrowing of the spinal canal.  Moderate bilateral neural foraminal narrowing is also seen at this level. 3.  At T12-L1 there is moderate bilateral neural foraminal stenosis due to facet joint arthropathy. 4.  Pedicular edema at T12, L3, and L4 may relate to stress reaction or inflammatory change. 5.  Small amount of pelvic free fluid. If not already performed, a CT of the abdomen and pelvis can be considered. Anatomic Thoracic/Lumbar Variant: None. L4-5 is considered the level of the iliac crest and assume there are 5 lumbar-type vertebrae. MRI pelvis (10/29/21):  COMPARISON: 09/12/2015   TECHNIQUE: T1 weighted and inversion recovery coronal, T1 and fat suppression T2-weighted axial and T1 weighted sagittal imaging of the pelvis and fat suppression T2-weighted coronal oblique imaging of the sacrum were performed. RESULT:   Bone marrow: Again demonstrated is diffuse marrow signal abnormality about both sacroiliac joints suggesting chronic sacroiliitis. Hip joints: Within normal limits. A physiologic amount of fluid is present within both hip joints. There is no evidence of a labral tear nor paralabral cyst.   Sacroiliac joints: Within normal limits. Pubic symphysis: Within normal limits. Tendons: Within normal limits including the iliopsoas, hamstring, gluteal   and rectus femoris tendons. Muscles: Within normal limits. IMPRESSION:   1. BILATERAL SACROILIITIS WHICH APPEARS CHRONIC. A SERONEGATIVE SPONDYLOARTHROPATHY SUCH AS ANKYLOSING SPONDYLITIS SHOULD BE CONSIDERED.      Procedures:   Note from GI (Dr. Kendy Molina) from 8/17/18:  \"Pt w/ CD, R sided colitis at last c-scope in 2011. Alexander Dwyer has been noncompliant with meds and has not followed up with us until recently. .. Restarted Apriso 1 month back\"     C-scope from 8/17/18:  \"There was erosion, friability, granularity, linear ulcer, loss of vascular marking noted in the colon.  There was stigmata bleeding noted.  Cold forceps biopsies were obtained for the purpose of histology\".  Random colon Bx showed \"focal active colitis, negative for dysplasia\". Above results were discussed w/ the pt in detail during today's visit.

## 2022-06-30 ENCOUNTER — OFFICE VISIT (OUTPATIENT)
Dept: RHEUMATOLOGY | Age: 32
End: 2022-06-30
Payer: MEDICARE

## 2022-06-30 VITALS
BODY MASS INDEX: 21.63 KG/M2 | HEART RATE: 70 BPM | DIASTOLIC BLOOD PRESSURE: 80 MMHG | SYSTOLIC BLOOD PRESSURE: 124 MMHG | WEIGHT: 126 LBS

## 2022-06-30 DIAGNOSIS — M45.0 ANKYLOSING SPONDYLITIS OF MULTIPLE SITES IN SPINE (HCC): Primary | ICD-10-CM

## 2022-06-30 DIAGNOSIS — L93.0 DISCOID LUPUS: ICD-10-CM

## 2022-06-30 DIAGNOSIS — K50.90 CROHN'S DISEASE WITHOUT COMPLICATION, UNSPECIFIED GASTROINTESTINAL TRACT LOCATION (HCC): ICD-10-CM

## 2022-06-30 DIAGNOSIS — Z79.899 HIGH RISK MEDICATION USE: ICD-10-CM

## 2022-06-30 DIAGNOSIS — E55.9 VITAMIN D DEFICIENCY: ICD-10-CM

## 2022-06-30 PROCEDURE — 99215 OFFICE O/P EST HI 40 MIN: CPT | Performed by: INTERNAL MEDICINE

## 2022-06-30 PROCEDURE — 4004F PT TOBACCO SCREEN RCVD TLK: CPT | Performed by: INTERNAL MEDICINE

## 2022-06-30 PROCEDURE — G8427 DOCREV CUR MEDS BY ELIG CLIN: HCPCS | Performed by: INTERNAL MEDICINE

## 2022-06-30 PROCEDURE — G8420 CALC BMI NORM PARAMETERS: HCPCS | Performed by: INTERNAL MEDICINE

## 2022-06-30 RX ORDER — AZATHIOPRINE 50 MG/1
100 TABLET ORAL DAILY
Qty: 180 TABLET | Refills: 0 | Status: SHIPPED | OUTPATIENT
Start: 2022-06-30 | End: 2022-10-06

## 2022-06-30 RX ORDER — PREDNISONE 1 MG/1
TABLET ORAL
Qty: 52 TABLET | Refills: 2 | Status: SHIPPED | OUTPATIENT
Start: 2022-06-30 | End: 2022-07-16

## 2022-06-30 NOTE — ASSESSMENT & PLAN NOTE
- followed by GI, Dr. Rosalinda Bates in [place]. - pt stated IBD Sx improved after switching MTX to AZA. - resume  mg daily and cont Ustekinumab as above #1.

## 2022-06-30 NOTE — ASSESSMENT & PLAN NOTE
- discussed his immunosuppressed state and indications to hold AZA and Ustekinumab, previously discussed immunization.  - he will be due for safety labs for AZA in 3 wks time.

## 2022-06-30 NOTE — ASSESSMENT & PLAN NOTE
- previously improved but remained active on Certolizumab 216 mg SC Q4wk. CRP improved but still remained mildly elevated on Certolizumab. Most recent MRI of L-spine and pelvis studies from 10/29/21 showed active axial disease. There was an issue w/ possible injection error w/ Certolizumab. He was switched to Ustekinumab in 5/2022, pt stated he received his IV induction dose at his GI office. He told me he is scheduled to start 90 mg SC Q8wk next month. - cont Ustekinumab 90 mg SC Q8wk per GI, will try this for at least 6 months. - noncompliant w/ AZA, discussed importance of taking this for his IBD related arthropathy, IBD and discoid lupus. Resume 100 mg daily. - he will take Prednisone tapers PRN for inflammatory back pain and IBD flares until Ustekinumab takes into full effect. - pt following w/ Pain Management in [place]. - pt will call our office for external referral to outside aquatic PT facility.

## 2022-07-01 ENCOUNTER — TELEPHONE (OUTPATIENT)
Dept: RHEUMATOLOGY | Age: 32
End: 2022-07-01

## 2022-07-01 DIAGNOSIS — M45.0 ANKYLOSING SPONDYLITIS OF MULTIPLE SITES IN SPINE (HCC): Primary | ICD-10-CM

## 2022-07-01 NOTE — TELEPHONE ENCOUNTER
Mother calling to give fax number for order to be sent to [facility] for water therapy.   [phone]

## 2022-07-19 PROBLEM — R63.6 UNDERWEIGHT: Status: ACTIVE | Noted: 2018-07-24

## 2022-08-03 ENCOUNTER — OFFICE (OUTPATIENT)
Dept: URBAN - METROPOLITAN AREA CLINIC 69 | Facility: CLINIC | Age: 32
End: 2022-08-03
Payer: MEDICAID

## 2022-08-03 ENCOUNTER — OFFICE (OUTPATIENT)
Dept: URBAN - METROPOLITAN AREA CLINIC 69 | Facility: CLINIC | Age: 32
End: 2022-08-03

## 2022-08-03 DIAGNOSIS — K50.10 CROHN'S DISEASE OF LARGE INTESTINE WITHOUT COMPLICATIONS: ICD-10-CM

## 2022-08-03 PROCEDURE — 96372 THER/PROPH/DIAG INJ SC/IM: CPT | Performed by: INTERNAL MEDICINE

## 2022-08-08 ENCOUNTER — TELEPHONE (OUTPATIENT)
Dept: RHEUMATOLOGY | Age: 32
End: 2022-08-08

## 2022-08-08 RX ORDER — PREDNISONE 1 MG/1
TABLET ORAL
Qty: 52 TABLET | Refills: 1 | Status: SHIPPED | OUTPATIENT
Start: 2022-08-08 | End: 2022-08-24

## 2022-08-08 NOTE — TELEPHONE ENCOUNTER
Spoke with mother and went over message in detail. She advised that she wanted me to call he pt and explain it to him too. Patient.  [phone]    Called patient and advised too,     Appt is already sent for 6 weeks

## 2022-08-08 NOTE — TELEPHONE ENCOUNTER
Sent Prednisone taper to his [employer] [place], start now. Please offer to move up his appt to 4-6 wks - needs to visit to discuss Tx plan if he does not improve.

## 2022-08-08 NOTE — TELEPHONE ENCOUNTER
Pt's mother called regarding patient. The pt has had 2 doses of Stelara with the GI. He has flared up that started 2 days ago. He is unable to get out of bed. He is complaining of his back hurting. She is asking the office to call Jojo Paniagua today. He needs something today. Kimberly Mcneil was out of the office today. Per Jojo Paniagua caller made aware she is not in the office and there is no covering physician. Jojo Paniagua will be in the office Tuesday, Wednesday, and Thursday.

## 2022-08-30 ENCOUNTER — OFFICE (OUTPATIENT)
Dept: URBAN - METROPOLITAN AREA CLINIC 18 | Facility: CLINIC | Age: 32
End: 2022-08-30

## 2022-08-30 VITALS
WEIGHT: 126 LBS | HEART RATE: 87 BPM | SYSTOLIC BLOOD PRESSURE: 116 MMHG | HEIGHT: 65 IN | DIASTOLIC BLOOD PRESSURE: 68 MMHG

## 2022-08-30 DIAGNOSIS — K50.10 CROHN'S DISEASE OF LARGE INTESTINE WITHOUT COMPLICATIONS: ICD-10-CM

## 2022-08-30 PROCEDURE — 99214 OFFICE O/P EST MOD 30 MIN: CPT | Performed by: INTERNAL MEDICINE

## 2022-10-03 NOTE — PROGRESS NOTES
Jaja Lyons MD  [facility]) Physicians - Rheumatology    [] [facility]:  [facility]  [address] [facility] [x] [place] Office:  [address]   Office: [phone]  Fax: [phone]     RHEUMATOLOGY PROGRESS NOTE    ASSESSMENT/PLAN:  Kenny Wolfe is a 34 y.o. male w/ AS (+HLA B27, elevated inflammatory markers, chronic inflammatory back pain, MRI evidence of active enthesopathy b/l sacroiliitis w/ peripheral involvement, Hx of plantar fasciitis), Crohn's disease (follows w/ GI, Dr. Allen Grande) and Bx proven discoid lupus (follows w/ Dermatologist, Dr. Olga Constantino). PMHx pertinent for HTN, PTSD and anxiety. Current rheum meds:  mg daily: started in 11/2021  Ustekinumab 90 mg SC Q8wk: per GI, received induction dose in 5/2022  Gabapentin 400 mg TID and Hydrocodone-Acetaminophen: managed by Pain Management [place])  Calcium citrate 1000 mg daily  Clobetasol 0.05% ointment BID (per Dermatology)     Prior rheum meds:  MTX 20 mg/wk: started in 6/2020, switched to AZA in 11/2021 d/t active IBD  Adalimumab 40 mg SC Q2wk: cleared by Derm and GI, took from 3/15/19 to 11/19, switched to Remicade d/t partial response, elevated CRP  Infliximab 8 mg/kg IV Q6wk, received 1st dose on 12/30/19, developed infusion reaction during 5th infusion on 2/64/76  Certolizumab 121 mg SC Q4wk: took from 9/2020-5/2022, switched to   Mesalamine (GI): pt decided to d/c in 11/19  Meloxicam 7.5 mg daily: prescribed by Pain Management, instructed pt to d/c d/t IBD    1. Ankylosing spondylitis of multiple sites in spine [facility])  Assessment & Plan:  - previously improved but remained active on Certolizumab 391 mg SC Q4wk. CRP improved but still remained mildly elevated on Certolizumab. Most recent MRI of L-spine and pelvis studies from 10/29/21 showed active axial disease. There was an issue w/ possible injection error w/ Certolizumab.  He was switched to Ustekinumab in 5/2022. Pt reported chronic inflammatory LBP w/ costochondritis on Certolizumab. - repeat inflammatory markers now. - repeat MRI L-spine and pelvis to evaluate the activity of his ankylosing spondylitis on Ustekinumab. - will plan on continuing Ustekinumab 90 mg SC [id number] total of 6 months. We will see him back in 8 wks for re-evaluation. - previously noncompliant w/ AZA, I am unclear if he's been taking this consistently. We discussed switching back to MTX for better control of his peripheral arthritis, pt declined. We discussed adding SSZ, pt also declined. He preferred to remain on AZA. - pt following w/ Pain Management in [place]. Orders:  -     C-Reactive Protein; Future  -     Sedimentation Rate; Future  -     azaTHIOprine (IMURAN) 50 MG tablet; Take 2 tablets by mouth daily, Disp-180 tablet, R-0Normal  -     predniSONE (DELTASONE) 5 MG tablet; Take 6 tablets by mouth every morning for 4 days, THEN 4 tablets every morning for 4 days, THEN 2 tablets every morning for 4 days, THEN 1 tablet every morning for 4 days. , Disp-52 tablet, R-2Normal  -     MRI LUMBAR SPINE W WO CONTRAST; Future  -     MRI PELVIS W WO CONTRAST; Future  -     Echocardiogram complete; Future  2. Chest pain of uncertain etiology  Assessment & Plan:  - check baseline echo study given his Dx of ankylosing spondylitis. - avoid NSAIDs d/t IBD. - prescribed Prednisone taper.  - he will call PCP or go to the ER if he develops worsening CP for cardiac w/u. Orders:  -     Echocardiogram complete; Future  3. Crohn's disease without complication, unspecified gastrointestinal tract location [facility])  Assessment & Plan:  - followed by Dr. Addy HUTCHISN in [place]. - pt stated IBD Sx improved after switching MTX to AZA. Currently well controlled on AZA and Ustekinumab. Orders:  -     C-Reactive Protein; Future  -     Sedimentation Rate; Future  -     azaTHIOprine (IMURAN) 50 MG tablet;  Take 2 tablets by mouth daily, Disp-180 tablet, R-0Normal  -     predniSONE (DELTASONE) 5 MG tablet; Take 6 tablets by mouth every morning for 4 days, THEN 4 tablets every morning for 4 days, THEN 2 tablets every morning for 4 days, THEN 1 tablet every morning for 4 days. , Disp-52 tablet, R-2Normal  4. Discoid lupus  Assessment & Plan:  - stable, following w/ Dermatology in [place]. - no evidence of SLE to date. - cont  mg daily. Orders:  -     azaTHIOprine (IMURAN) 50 MG tablet; Take 2 tablets by mouth daily, Disp-180 tablet, R-0Normal  5. High risk medication use  Assessment & Plan:  - discussed his immunosuppressed state and indications to hold AZA and Ustekinumab, previously discussed vaccines. - non-compliant w/ laboratory monitoring, pt agreed to obtain labs today. Orders:  -     CBC with Auto Differential; Future  -     AST; Future  -     ALT; Future  -     Creatinine; Future     Return in about 8 weeks (around 12/1/2022) for lab result discussion and treatment plan, medication monitoring. High complexity case. TIME SPENT TODAY:  I spent over 40 minutes of face-to-face time with the pt (including taking interval history and performing physical exam, review of medical records, independently interpreting results and communicating results to the pt/family/caregiver, counseling and educating the pt/family/caregiver, implementation of treatment plan, coordination of care, documenting clinical information in the EMR) during today visit. At least 50% of this time was spent in counseling, explanation of diagnosis, planning of further management, and coordination of care. The risks and benefits of my recommendations, as well as other treatment options, benefits and side effects were discussed with the patient today. Questions were answered. NOTE: This report is transcribed by using voice recognition software dragon.  Every effort is made to ensure accuracy; however, inadvertent computerized  transcription errors may be present. SUBJECTIVE:  Past medical/surgical history, medications and allergies are reviewed and updated as appropriate. Interval Hx:   Pt reports some improvement in his chronic low back pain and stiffness on Ustekinumab. He cont to feel pain and stiffness in his low back and SI joints, Sx are worse when he sleeps. L elbow hurts and swells intermittently, denies any other peripheral joint involvement. He recently developed episodic pain in his sternoclavicular joints, pain is reproducible and not exacerbated by exertion. Denies SOB or dizziness. He is currently asymptomatic. Pt states most recently he took a Prednisone taper two months ago for his arthritis flare which resolved all of his pain. Pt reports well controlled IBD Sx on Ustekinumab. Denies any diarrhea or bloody stools. He has lost 4 lb since his last visit. No new discoid lesions. Denies new rash, oral or nasal ulcers, sicca, Raynaud's phenomenon. Pt states he ran out of AZA yesterday. Rheumatologic ROS:  Constitutional: denies fever/chills, night sweats, unintentional weight loss  Integumentary: +photosensitivity w/ chronic discoid lupus no new lesions and chronic diffuse alopecia, denies Sx of Raynaud's phenomenon  Eyes: denies dry eyes, redness or pain  Oral cavity: denies recent oral ulcers, dry mouth or thrush  Cardiovascular: denies CP, palpitations, Hx of pericardial effusion or pericarditis  Respiratory: denies SOB, cough, hemoptysis, or pleurisy  Gastrointestinal: IBD Sx as above HPI  Musculoskeletal:  refer to above HPI     Allergies   Allergen Reactions    Remicade [Infliximab] Other (See Comments)       Past Medical History:    History reviewed. No pertinent past medical history. Past Surgical History:    No past surgical history on file.     Medications:    Current Outpatient Medications   Medication Sig Dispense Refill    azaTHIOprine (IMURAN) 50 MG tablet Take 2 tablets by mouth daily 180 tablet 0    predniSONE (DELTASONE) 5 MG tablet Take 6 tablets by mouth every morning for 4 days, THEN 4 tablets every morning for 4 days, THEN 2 tablets every morning for 4 days, THEN 1 tablet every morning for 4 days. 52 tablet 2    gabapentin (NEURONTIN) 300 MG capsule Take 300 mg by mouth 3 times daily. fluocinolone 0.01 % cream Apply topically 2 times daily      hydrocortisone 0.5 % cream Apply topically 2 times daily Apply topically 2 times daily. amLODIPine (NORVASC) 10 MG tablet Take 10 mg by mouth daily      ferrous sulfate 325 (65 Fe) MG tablet Take 325 mg by mouth daily (with breakfast)       No current facility-administered medications for this visit. OBJECTIVE:  Physical Exam:  /80   Pulse 64   Wt 122 lb 3.2 oz (55.4 kg)   BMI 20.98 kg/m²     GEN: AAOx3, in NAD, well-appearing, accompanied by mother  HEAD: normocephalic, atraumatic  EYES: no injection or icterus  CVS: RRR  LUNGS: in no acute respiratory distress, CTAB  MSK:  Upper extremities:              Hands: no active synovitis or dactylitis, joints NTTP, full fist formation w/ strong  strength              Wrist: no synovitis in the wrist joints b/l, FROM   Elbow: L lateral epicondyl w/ mild swelling TTP, no synovitis of the elbow joint, no olecranon bursitis  Lower extremities:              Knees: no warmth or effusion present, FROM              Ankles: no synovitis, FROM              Feet: no toe swelling or pain or warmth on palpation w/ FROM, negative MTP squeeze test  INTEGUMENT: hypopigmented scars and macules on face, inner ear, and arms, prior dry and scaly skin resolved, there is diffuse hair thinning, multiple tattoos, no petechiae, bruises, or palpable purpura    DATA:  Labs:   I personally reviewed interval labs and discussed w/ the pt in detail which showed:    Lab Results   Component Value Date    WBC 6.1 04/21/2022    HGB 11.6 (L) 04/21/2022    HCT 34.3 (L) 04/21/2022    MCV 94.6 04/21/2022     04/21/2022    LYMPHOPCT 43.3 04/21/2022    RBC 3.63 (L) 04/21/2022    MCH 31.9 04/21/2022    MCHC 33.8 04/21/2022    RDW 16.5 (H) 04/21/2022     Lab Results   Component Value Date     10/28/2021    K 4.2 10/28/2021     10/28/2021    CO2 26 10/28/2021    BUN 10 10/28/2021    CREATININE 0.9 04/21/2022    GLUCOSE 82 10/28/2021    CALCIUM 9.0 10/28/2021    PROT 6.2 (L) 04/21/2022    LABALBU 3.9 04/21/2022    BILITOT <0.2 04/21/2022    ALKPHOS 67 04/21/2022    AST 11 (L) 04/21/2022    ALT <5 (L) 04/21/2022    LABGLOM >60 04/21/2022    GFRAA >60 04/21/2022    AGRATIO 1.4 10/28/2021    GLOB 3.1 05/05/2020     Lab Results   Component Value Date    COLORU YELLOW 10/28/2021    CLARITYU CLOUDY (A) 10/28/2021    GLUCOSEU Negative 10/28/2021    BILIRUBINUR Negative 10/28/2021    KETUA Negative 10/28/2021    SPECGRAV 1.022 10/28/2021    BLOODU Negative 10/28/2021    PHUR 6.5 10/28/2021    PROTEINU Negative 10/28/2021    UROBILINOGEN 0.2 10/28/2021    NITRU Negative 10/28/2021    LEUKOCYTESUR Negative 10/28/2021    LABMICR YES 10/28/2021    URINETYPE NotGiven 10/28/2021    HYALCAST 0 10/28/2021    WBCUA 1 10/28/2021    RBCUA 1 10/28/2021    EPIU 0 10/28/2021     Lab Results   Component Value Date    VITD25 96.5 02/17/2022     Lab Results   Component Value Date    C3 131.8 10/28/2021    C3 158.1 07/17/2020    C3 138.4 08/21/2019    C3 150.2 02/20/2019     Lab Results   Component Value Date    C4 25.3 10/28/2021    C4 31.4 07/17/2020    C4 26.9 08/21/2019    C4 33.4 02/20/2019     Lab Results   Component Value Date    ANTIDSDNAIGG <1 10/28/2021    ANTIDSDNAIGG 1 08/21/2019      No results found for: [facility]     Lab Results   Component Value Date    CRP 11.5 (H) 04/21/2022    CRP 17.8 (H) 02/17/2022    CRP 36.2 (H) 10/28/2021    CRP 57.8 (H) 08/26/2021     Lab Results   Component Value Date    SEDRATE 32 (H) 04/21/2022    SEDRATE 38 (H) 02/17/2022    SEDRATE 29 (H) 10/28/2021    SEDRATE 60 (H) 05/05/2020     No results found for: CKTOTAL Positive HLA-B27 (1/16/19)  Negative NENITA (10/9/18, 2/20/19, 7/17/20)  Negative SSA, SSB (1/16/19)  Negative hepatitis B and C serologies (1/16/19)  Negative Quantiferon TB (1/16/19) --> indeterminate (7/17/20, 7/23/20)    Imaging:  I personally reviewed interval imaging and discussed w/ the pt in detail which included:    X-ray L-spine (8/6/18): Severe sclerosis of the iliac side of both SI joints w/ serrated appearance of the SI joints. 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 Otherwise normal exam.     MRI C-spine (9/14/15): No signal abnormality within the visualized cord. Slight reversal of the normal cervical lordosis, nonspecific. No prevertebral soft tissue swelling. Vertebral body heights are well-maintained without acute fracture or osseous STIR signal abnormality. Preservation of the disc space heights. No listhesis. No lateralizing disc herniation or significant central canal, lateral recess, or neuroforaminal stenosis. Anterior and posterior longitudinal ligaments are intact. Ligamentum flavum intact. Occipitocervical ligaments intact. No interspinous widening. MRI C-spine (3/6/19):  1. Extensive edema in the posterior elements of the lower C-spine extending from the level of C4-T1, L>R, as well as in the paraspinal, interspinous, and intercostal soft tissues likely related to active enthesopathy related to the pt's Hx of AS. 2. Mild central spinal canal narrowing at C3-C4, through C6-C7. MRI T-spine (9/14/15): IMPRESSION:   No cord signal abnormality, marrow edema, lateralizing disc herniation, or significant stenosis. MRI L-spine (9/14/15): IMPRESSION:   1. No marrow edema, lateralizing disc herniation, or significant stenosis. Mild stenosis, as detailed above.   2. Sclerosis and erosion involving the visualized portion of the SI joints, L greater than R, new from the 2006 MRI, present and better seen on the CT performed August 13, 2014. DDx includes IBD, inflammatory arthropathy (psoriatic arthritis, reactive arthritis), etc.     MRI L-spine (8/14/19, ordered per Pain Management request):  Mild-to-moderate multilevel facet arthropathy, otherwise unremarkable MRI of the lumbar spine. No significant neural foraminal or spinal canal stenosis. MRI L-spine (10/29/21):  RESULT:   Counting reference:  Lumbosacral junction. For the purposes of this report,  L4-5 is considered the level of the iliac crest and assume there are 5 lumbar-type vertebrae. Anatomic variant:  None. Localizer images:  No additional findings. Alignment:    Alignment is anatomic. Bone marrow signal/fracture:  No evidence of pathologic marrow infiltration. No evidence of prior fracture. There is very mild edema along the anterior superior endplates of L5 and L4 vertebral bodies, more prominent at L5. There is minimal intervertebral disc space height loss at L4-L5. There is increased STIR signal intensity within the right pedicle of T12 vertebral body which appears to extend into the right posterior 12th rib. There is also mild pedicular edema at L3 and L4 levels bilaterally. Conus: The conus is within normal limits of signal intensity and   morphology. Paraspinal soft tissues:   Paraspinal soft tissues are within normal limits. Lower thoracic spine:  Visualized lower thoracic canal and foramina are patent. T12-L1:  There is moderate facet joint arthropathy with causes moderate bilateral neural foraminal stenosis. There is no narrowing of the spinal canal.   L1-L2:    Canal and foramina are patent. Mild facet joint arthropathy is seen. L2-L3:    Canal and foramina are patent. Moderate bilateral facet joint   arthropathy is seen.    L3-L4:    There is a central disc protrusion that impresses on the thecal sac and is moderate facet joint arthropathy with slight narrowing of the lateral recesses and mild narrowing of the spinal canal.  There is   moderate bilateral neural foraminal stenosis, slightly worse on the left due to disc bulge and facet joint arthropathy. Mild bilateral facet joint arthropathy is seen. L4-L5:    There is moderate bilateral facet joint arthropathy which causes mild bilateral neural foraminal narrowing. There is no narrowing of the spinal canal.   L5-S1:    Canal and foramina are patent   Sacrum and iliac wings:   Fatty marrow changes are noted along the sacroiliac joints bilaterally. Partially visualized is increased STIR signal intensity within the pelvis as may represent pelvic free fluid. IMPRESSION:   1. There is mild edema along the anterior superior endplates of L4 and L5 vertebra. This finding could represent early osteitis/Romanus lesions with ankylosing spondylitis. 2.  At L4-L5 there is a disc herniation that causes mild narrowing of the spinal canal.  Moderate bilateral neural foraminal narrowing is also seen at this level. 3.  At T12-L1 there is moderate bilateral neural foraminal stenosis due to facet joint arthropathy. 4.  Pedicular edema at T12, L3, and L4 may relate to stress reaction or inflammatory change. 5.  Small amount of pelvic free fluid. If not already performed, a CT of the abdomen and pelvis can be considered. Anatomic Thoracic/Lumbar Variant: None. L4-5 is considered the level of the iliac crest and assume there are 5 lumbar-type vertebrae. MRI pelvis (10/29/21):  COMPARISON: 09/12/2015   TECHNIQUE: T1 weighted and inversion recovery coronal, T1 and fat suppression T2-weighted axial and T1 weighted sagittal imaging of the pelvis and fat suppression T2-weighted coronal oblique imaging of the sacrum were performed. RESULT:   Bone marrow: Again demonstrated is diffuse marrow signal abnormality about both sacroiliac joints suggesting chronic sacroiliitis. Hip joints: Within normal limits. A physiologic amount of fluid is present within both hip joints.   There is no evidence of a labral tear nor paralabral cyst.   Sacroiliac joints: Within normal limits. Pubic symphysis: Within normal limits. Tendons: Within normal limits including the iliopsoas, hamstring, gluteal   and rectus femoris tendons. Muscles: Within normal limits. IMPRESSION:   1. BILATERAL SACROILIITIS WHICH APPEARS CHRONIC. A SERONEGATIVE SPONDYLOARTHROPATHY SUCH AS ANKYLOSING SPONDYLITIS SHOULD BE CONSIDERED. Procedures:   Note from GI (Dr. Shelia Paniagua) from 8/17/18:  \"Pt w/ CD, R sided colitis at last c-scope in 2011. He has been noncompliant with meds and has not followed up with us until recently. .. Restarted Apriso 1 month back\"     C-scope from 8/17/18:  \"There was erosion, friability, granularity, linear ulcer, loss of vascular marking noted in the colon. There was stigmata bleeding noted. Cold forceps biopsies were obtained for the purpose of histology\". Random colon Bx showed \"focal active colitis, negative for dysplasia\". Above results were discussed w/ the pt in detail during today's visit.

## 2022-10-06 ENCOUNTER — OFFICE VISIT (OUTPATIENT)
Dept: RHEUMATOLOGY | Age: 32
End: 2022-10-06
Payer: MEDICARE

## 2022-10-06 VITALS
SYSTOLIC BLOOD PRESSURE: 120 MMHG | DIASTOLIC BLOOD PRESSURE: 80 MMHG | BODY MASS INDEX: 20.98 KG/M2 | WEIGHT: 122.2 LBS | HEART RATE: 64 BPM

## 2022-10-06 DIAGNOSIS — R07.9 CHEST PAIN OF UNCERTAIN ETIOLOGY: ICD-10-CM

## 2022-10-06 DIAGNOSIS — Z79.899 HIGH RISK MEDICATION USE: ICD-10-CM

## 2022-10-06 DIAGNOSIS — M45.0 ANKYLOSING SPONDYLITIS OF MULTIPLE SITES IN SPINE (HCC): Primary | ICD-10-CM

## 2022-10-06 DIAGNOSIS — K50.90 CROHN'S DISEASE WITHOUT COMPLICATION, UNSPECIFIED GASTROINTESTINAL TRACT LOCATION (HCC): ICD-10-CM

## 2022-10-06 DIAGNOSIS — L93.0 DISCOID LUPUS: ICD-10-CM

## 2022-10-06 DIAGNOSIS — M45.0 ANKYLOSING SPONDYLITIS OF MULTIPLE SITES IN SPINE (HCC): ICD-10-CM

## 2022-10-06 LAB
ALT SERPL-CCNC: <5 U/L (ref 10–40)
AST SERPL-CCNC: 9 U/L (ref 15–37)
BASOPHILS ABSOLUTE: 0 K/UL (ref 0–0.2)
BASOPHILS RELATIVE PERCENT: 0.9 %
C-REACTIVE PROTEIN: 104.3 MG/L (ref 0–5.1)
CREAT SERPL-MCNC: 0.9 MG/DL (ref 0.9–1.3)
EOSINOPHILS ABSOLUTE: 0.1 K/UL (ref 0–0.6)
EOSINOPHILS RELATIVE PERCENT: 3.3 %
GFR AFRICAN AMERICAN: >60
GFR NON-AFRICAN AMERICAN: >60
HCT VFR BLD CALC: 32 % (ref 40.5–52.5)
HEMOGLOBIN: 10.8 G/DL (ref 13.5–17.5)
LYMPHOCYTES ABSOLUTE: 1.3 K/UL (ref 1–5.1)
LYMPHOCYTES RELATIVE PERCENT: 30.3 %
MCH RBC QN AUTO: 31.6 PG (ref 26–34)
MCHC RBC AUTO-ENTMCNC: 33.8 G/DL (ref 31–36)
MCV RBC AUTO: 93.6 FL (ref 80–100)
MONOCYTES ABSOLUTE: 0.3 K/UL (ref 0–1.3)
MONOCYTES RELATIVE PERCENT: 7.1 %
NEUTROPHILS ABSOLUTE: 2.5 K/UL (ref 1.7–7.7)
NEUTROPHILS RELATIVE PERCENT: 58.4 %
PDW BLD-RTO: 15.8 % (ref 12.4–15.4)
PLATELET # BLD: 507 K/UL (ref 135–450)
PMV BLD AUTO: 6.3 FL (ref 5–10.5)
RBC # BLD: 3.42 M/UL (ref 4.2–5.9)
SEDIMENTATION RATE, ERYTHROCYTE: 56 MM/HR (ref 0–15)
WBC # BLD: 4.3 K/UL (ref 4–11)

## 2022-10-06 PROCEDURE — G8484 FLU IMMUNIZE NO ADMIN: HCPCS | Performed by: INTERNAL MEDICINE

## 2022-10-06 PROCEDURE — G8420 CALC BMI NORM PARAMETERS: HCPCS | Performed by: INTERNAL MEDICINE

## 2022-10-06 PROCEDURE — G8427 DOCREV CUR MEDS BY ELIG CLIN: HCPCS | Performed by: INTERNAL MEDICINE

## 2022-10-06 PROCEDURE — 4004F PT TOBACCO SCREEN RCVD TLK: CPT | Performed by: INTERNAL MEDICINE

## 2022-10-06 PROCEDURE — 99215 OFFICE O/P EST HI 40 MIN: CPT | Performed by: INTERNAL MEDICINE

## 2022-10-06 RX ORDER — AZATHIOPRINE 50 MG/1
100 TABLET ORAL DAILY
Qty: 180 TABLET | Refills: 0 | Status: SHIPPED | OUTPATIENT
Start: 2022-10-06 | End: 2022-10-07 | Stop reason: SDUPTHER

## 2022-10-06 RX ORDER — PREDNISONE 1 MG/1
TABLET ORAL
Qty: 52 TABLET | Refills: 2 | Status: SHIPPED | OUTPATIENT
Start: 2022-10-06 | End: 2022-10-07 | Stop reason: SDUPTHER

## 2022-10-06 NOTE — ASSESSMENT & PLAN NOTE
- followed by GI, Dr. Kimberly Bedolla in [place]. - pt stated IBD Sx improved after switching MTX to AZA. Currently well controlled on AZA and Ustekinumab.

## 2022-10-06 NOTE — ASSESSMENT & PLAN NOTE
- discussed his immunosuppressed state and indications to hold AZA and Ustekinumab, previously discussed vaccines. - non-compliant w/ laboratory monitoring, pt agreed to obtain labs today.

## 2022-10-06 NOTE — ASSESSMENT & PLAN NOTE
- check baseline echo study given his Dx of ankylosing spondylitis. - avoid NSAIDs d/t IBD. - prescribed Prednisone taper.  - he will call PCP or go to the ER if he develops worsening CP for cardiac w/u.

## 2022-10-06 NOTE — ASSESSMENT & PLAN NOTE
- previously improved but remained active on Certolizumab 469 mg SC Q4wk. CRP improved but still remained mildly elevated on Certolizumab. Most recent MRI of L-spine and pelvis studies from 10/29/21 showed active axial disease. There was an issue w/ possible injection error w/ Certolizumab. He was switched to Ustekinumab in 5/2022. Pt reported chronic inflammatory LBP w/ costochondritis on Certolizumab. - repeat inflammatory markers now. - repeat MRI L-spine and pelvis to evaluate the activity of his ankylosing spondylitis on Ustekinumab. - will plan on continuing Ustekinumab 90 mg SC [id number] total of 6 months. We will see him back in 8 wks for re-evaluation. - previously noncompliant w/ AZA, I am unclear if he's been taking this consistently. We discussed switching back to MTX for better control of his peripheral arthritis, pt declined. We discussed adding SSZ, pt also declined. He preferred to remain on AZA. - pt following w/ Pain Management in [place].

## 2022-10-07 ENCOUNTER — TELEPHONE (OUTPATIENT)
Dept: RHEUMATOLOGY | Age: 32
End: 2022-10-07

## 2022-10-07 DIAGNOSIS — Z79.899 HIGH RISK MEDICATION USE: Primary | ICD-10-CM

## 2022-10-07 DIAGNOSIS — L93.0 DISCOID LUPUS: ICD-10-CM

## 2022-10-07 DIAGNOSIS — M45.0 ANKYLOSING SPONDYLITIS OF MULTIPLE SITES IN SPINE (HCC): ICD-10-CM

## 2022-10-07 DIAGNOSIS — K50.90 CROHN'S DISEASE WITHOUT COMPLICATION, UNSPECIFIED GASTROINTESTINAL TRACT LOCATION (HCC): ICD-10-CM

## 2022-10-07 RX ORDER — AZATHIOPRINE 50 MG/1
100 TABLET ORAL DAILY
Qty: 180 TABLET | Refills: 0 | Status: SHIPPED | OUTPATIENT
Start: 2022-10-07 | End: 2023-01-05

## 2022-10-07 RX ORDER — SULFASALAZINE 500 MG/1
TABLET ORAL
Qty: 70 TABLET | Refills: 1 | Status: SHIPPED | OUTPATIENT
Start: 2022-10-07

## 2022-10-07 RX ORDER — PREDNISONE 1 MG/1
TABLET ORAL
Qty: 52 TABLET | Refills: 2 | Status: SHIPPED | OUTPATIENT
Start: 2022-10-07 | End: 2022-10-23

## 2022-10-07 NOTE — TELEPHONE ENCOUNTER
Signed Rx. Please inform pt that his inflammatory marker is very elevated. This means his ankylosing spondylitis and/or Crohn's disease is active. He did not want to resume MTX when I talked to him yesterday. I'd like him to try SSZ for better control of his ankylosing spondylitis. Sent Rx to pharmacy, he can start this now. NEEDS TO GET LABS 4 WEEKS AFTER STARTING SSZ, he has standing lab orders. Please mail pt handout on SSZ. Start Pred taper now. Cont same dose of AZA and Stelara. Get the MRI studies I ordered. We'll see him back in 2 months to discuss his Tx plan going forward.

## 2022-10-18 ENCOUNTER — TELEPHONE (OUTPATIENT)
Dept: RHEUMATOLOGY | Age: 32
End: 2022-10-18

## 2022-10-18 NOTE — TELEPHONE ENCOUNTER
Patient called stating that the pharmacy did not get the \"new scripts\" Luz Fuentes was supposed to send to the pharmacy. He reports they didn't get the scripts. Called and spoke with pharmacy. He picked up the scripts except the sulfasalazine. Gave verbal orders to the pharmacy.

## 2022-11-17 RX ORDER — METHYLPREDNISOLONE 4 MG/1
TABLET ORAL
Qty: 1 KIT | Refills: 0 | Status: SHIPPED | OUTPATIENT
Start: 2022-11-17 | End: 2022-11-22 | Stop reason: SDUPTHER

## 2022-11-18 NOTE — PROGRESS NOTES
Fabrice Mchugh MD  [facility]) Physicians - Rheumatology    [x] [facility]:  [facility] Dr. Madrigal [address] [place] [address] [] [address]:  [address], [address]   Office: [phone]  Fax: [phone]     RHEUMATOLOGY PROGRESS NOTE    ASSESSMENT/PLAN:  Courtney Spence is a 34 y.o. male w/ AS (+HLA B27, elevated inflammatory markers, chronic inflammatory back pain, MRI evidence of active enthesopathy b/l sacroiliitis w/ peripheral involvement, Hx of plantar fasciitis), Crohn's disease (follows w/ GI, Dr. Emmanuel Lopez) and Bx proven discoid lupus (follows w/ Dermatologist, Dr. Pacheco Rivera). PMHx pertinent for HTN, PTSD and anxiety. Current rheum meds:  mg daily: started in 11/2021  Ustekinumab 90 mg SC Q8wk: per GI, received induction dose in 5/2022  Gabapentin 400 mg TID and Hydrocodone-Acetaminophen: managed by Pain Management Pauly Dukes  Calcium citrate 1000 mg daily  Clobetasol 0.05% ointment BID (per Dermatology)     Prior rheum meds:  MTX 20 mg/wk: started in 6/2020, switched to AZA in 11/2021 d/t active IBD  SSZ 1000 mg BID: tried on 10/2022, took for one month, developed allergic reaction  Adalimumab 40 mg SC Q2wk: cleared by Derm and GI, took from 3/15/19 to 11/19, switched to Remicade d/t partial response, elevated CRP  Infliximab 8 mg/kg IV Q6wk, received 1st dose on 12/30/19, developed infusion reaction during 5th infusion on 0/66/11  Certolizumab 805 mg SC Q4wk: took from 9/2020-5/2022, switched to Ustekinumab   Mesalamine (GI): pt decided to d/c in 11/19  Meloxicam 7.5 mg daily: prescribed by Pain Management, instructed pt to d/c d/t IBD    1. Ankylosing spondylitis of multiple sites in spine [facility])  Assessment & Plan:  - previously improved but remained active on Certolizumab 063 mg SC Q4wk. Pt reported chronic inflammatory LBP w/ costochondritis on Certolizumab, his CRP improved but still remained mildly elevated on Certolizumab.  Most recent MRI of L-spine and pelvis studies from 10/29/21 showed active axial disease. There was an issue w/ possible injection error w/ Certolizumab. He was switched to Ustekinumab in 5/2022 and reports uncontrolled axial Sx on Ustekinumab. CRP elevated to 104.3 on 10/6/22.  - repeat MRI L-spine and pelvis have been ordered to evaluate the activity of his ankylosing spondylitis on Ustekinumab. - he developed an allergic reaction to SSZ. - will plan on switching Ustekinumab to Golimumab start 2 mg/kg/dose IV x 1 on wk 0 and 4 followed by 2 mg/kg/dose IV Q8wk. Placed therapy plan, pt agreed to come to [facility] for infusions. - start steroid taper now. - previously noncompliant w/ AZA, I am unclear if he's been taking this consistently. We discussed switching back to MTX for better control of his peripheral arthritis, pt declined. Refilled AZA. - pt following w/ Pain Management in [place]. Orders:  -     C-Reactive Protein; Future  -     Sedimentation Rate; Future  2. Crohn's disease without complication, unspecified gastrointestinal tract location [facility])  Assessment & Plan:  - followed by GI, Dr. Brayan Gao in [place]. - pt stated IBD Sx improved after switching MTX to AZA. Currently well controlled on AZA and Ustekinumab. - switch Ustekinumab to Golimumab for better control of his ankylosing spondylitis. D/w pt and his mother that Golimumab is not FDA approved for the Tx of Crohn's disease. He will carefully monitor his GI Sx on Golimumab and AZA. Discussed plan w/ GI, Dr. Brayan Gao who is in agreement. Orders:  -     C-Reactive Protein; Future  -     Sedimentation Rate; Future  3. Discoid lupus  Assessment & Plan:  - stable, following w/ Dermatology in [place]. - no evidence of SLE to date. - cont  mg daily.   4. High risk medication use  Assessment & Plan:  - I d/w the pt that TNF-INHIBITORS can cause increased risk of TB reactivation, oppurtunistic infections, lupus like illness, drug induced lupus, rash, hypersensitivity reaction, infusion reactions, demyelinating disease and possible risk of malignancies and lung diseases. I answered all of the pt's questions to the best of my knowledge and provided the pt a detailed handout on Golimumab. - previously had indeterminate Quantiferon TB test x 2 in 7/2020. He was evaluated by Lashay Quinteros, Dr. Clay Santos and cleared to start TNFi on 8/27/20.   - labs for AZA Q3mo. - the risks, benefits, and alternatives to the use of Prednisone were discussed w/ the pt. Side effects included, but were not limited development of hypertension, hyperglycemia or diabetes mellitus, peptic ulcer disease, cataracts and glaucoma, osteoporosis and risk for fractures, weight gain, cushingoid appearance, avascular necrosis, mood changes including depression and euphoria, the development of a decreased ability to fight off infections. Rare side effects like AVN are seen with long term use of steroids as also, are complications associated with withdrawing from the drug abruptly. Orders:  -     ALT; Future  -     AST; Future  -     CBC with Auto Differential; Future  -     Creatinine; Future  -     Quantiferon, Incubated; Future     Return in about 2 months (around 1/22/2023) for lab result discussion and treatment plan, medication monitoring. High complexity case. TIME SPENT TODAY:  I spent over 40 minutes of face-to-face time with the pt (including taking interval history and performing physical exam, review of medical records, independently interpreting results and communicating results to the pt/family/caregiver, counseling and educating the pt/family/caregiver, implementation of treatment plan, coordination of care including discussion of case w/ GI specialist, documenting clinical information in the EMR) during today visit. At least 50% of this time was spent in counseling, explanation of diagnosis, planning of further management, and coordination of care.     The risks and benefits of my recommendations, as well as other treatment options, benefits and side effects were discussed with the patient today. Questions were answered. NOTE: This report is transcribed by using voice recognition software dragon. Every effort is made to ensure accuracy; however, inadvertent computerized  transcription errors may be present. SUBJECTIVE:  Past medical/surgical history, medications and allergies are reviewed and updated as appropriate. Interval Hx:   Pt states he developed itchy hives on his trunk, extremities and ears after increasing his SSZ dose to 1000 mg BID. He also recalls having lip swelling. Denies SOB or wheezing. He was seen at outside ED on 11/15/22 and received Diphenhydramine for possible allergic reaction to SSZ. He was prescribed a Medrol Dosepak by us last wk but did not fill this Rx. He was prescribed Hydroxyzine by his PCP 5 days ago. Pt states most of his hives and lip swelling have completely resolved. Pt reports chronic inflammatory LBP. He does not think Ustekinumab is working. He reports compliance w/ AZA. He denies any active Sx of IBD. Rheumatologic ROS:  Constitutional: denies fever/chills, night sweats, unintentional weight loss  Integumentary: +photosensitivity w/ chronic discoid lupus no new lesions and chronic diffuse alopecia, denies Sx of Raynaud's phenomenon  Eyes: denies dry eyes, redness or pain  Oral cavity: denies recent oral ulcers, dry mouth or thrush  Cardiovascular: denies CP, palpitations, Hx of pericardial effusion or pericarditis  Respiratory: denies SOB, cough, hemoptysis, or pleurisy  Gastrointestinal: IBD Sx as above HPI  Musculoskeletal:  refer to above HPI     Allergies   Allergen Reactions    Sulfa Antibiotics Anaphylaxis, Hives and Swelling     Sulfasalazine    Remicade [Infliximab] Other (See Comments)       Past Medical History:    History reviewed. No pertinent past medical history.     Past Surgical History:    No past surgical history on file. Medications:    Current Outpatient Medications   Medication Sig Dispense Refill    gabapentin (NEURONTIN) 600 MG tablet       HYDROcodone-acetaminophen (NORCO) 7.5-325 MG per tablet       hydrOXYzine HCl (ATARAX) 25 MG tablet       mometasone (ELOCON) 0.1 % cream       tiZANidine (ZANAFLEX) 4 MG tablet       STELARA 90 MG/ML SOSY prefilled syringe       azaTHIOprine (IMURAN) 50 MG tablet Take 2 tablets by mouth daily 180 tablet 0    methylPREDNISolone (MEDROL, PAN,) 4 MG tablet Take as directed 1 kit 0    fluocinolone 0.01 % cream Apply topically 2 times daily      hydrocortisone 0.5 % cream Apply topically 2 times daily Apply topically 2 times daily. amLODIPine (NORVASC) 10 MG tablet Take 10 mg by mouth daily       No current facility-administered medications for this visit. OBJECTIVE:  Physical Exam:  /78   Pulse 90   Wt 121 lb (54.9 kg)   BMI 20.77 kg/m²     GEN: AAOx3, in NAD, well-appearing, accompanied by mother  HEAD: normocephalic, atraumatic  EYES: no injection or icterus  CVS: RRR  LUNGS: in no acute respiratory distress, CTAB  MSK:  Upper extremities:              Hands: no active synovitis or dactylitis, joints NTTP, full fist formation w/ strong  strength              Wrist: no synovitis in the wrist joints b/l, FROM   Elbow: no synovitis of the elbow joints or olecranon bursitis  Lower extremities:              Knees: no warmth or effusion present, FROM              Ankles: no synovitis, FROM              Feet: no toe swelling or pain or warmth on palpation w/ FROM, negative MTP squeeze test  INTEGUMENT: no visible urticaria or rash, hypopigmented scars and macules on face, inner ear, and arms, prior dry and scaly skin resolved, there is diffuse hair thinning, multiple tattoos, no petechiae, bruises, or palpable purpura    DATA:  Labs:   I personally reviewed interval labs and discussed w/ the pt in detail which showed:    Lab Results   Component Value Date    WBC 4.3 10/06/2022    HGB 10.8 (L) 10/06/2022    HCT 32.0 (L) 10/06/2022    MCV 93.6 10/06/2022     (H) 10/06/2022    LYMPHOPCT 30.3 10/06/2022    RBC 3.42 (L) 10/06/2022    MCH 31.6 10/06/2022    MCHC 33.8 10/06/2022    RDW 15.8 (H) 10/06/2022     Lab Results   Component Value Date     10/28/2021    K 4.2 10/28/2021     10/28/2021    CO2 26 10/28/2021    BUN 10 10/28/2021    CREATININE 0.9 10/06/2022    GLUCOSE 82 10/28/2021    CALCIUM 9.0 10/28/2021    PROT 6.2 (L) 04/21/2022    LABALBU 3.9 04/21/2022    BILITOT <0.2 04/21/2022    ALKPHOS 67 04/21/2022    AST 9 (L) 10/06/2022    ALT <5 (L) 10/06/2022    LABGLOM >60 10/06/2022    GFRAA >60 10/06/2022    AGRATIO 1.4 10/28/2021    GLOB 3.1 05/05/2020     Lab Results   Component Value Date    COLORU YELLOW 10/28/2021    CLARITYU CLOUDY (A) 10/28/2021    GLUCOSEU Negative 10/28/2021    BILIRUBINUR Negative 10/28/2021    KETUA Negative 10/28/2021    SPECGRAV 1.022 10/28/2021    BLOODU Negative 10/28/2021    PHUR 6.5 10/28/2021    PROTEINU Negative 10/28/2021    UROBILINOGEN 0.2 10/28/2021    NITRU Negative 10/28/2021    LEUKOCYTESUR Negative 10/28/2021    LABMICR YES 10/28/2021    URINETYPE NotGiven 10/28/2021    HYALCAST 0 10/28/2021    WBCUA 1 10/28/2021    RBCUA 1 10/28/2021    EPIU 0 10/28/2021     Lab Results   Component Value Date    VITD25 96.5 02/17/2022     Lab Results   Component Value Date    C3 131.8 10/28/2021    C3 158.1 07/17/2020    C3 138.4 08/21/2019    C3 150.2 02/20/2019     Lab Results   Component Value Date    C4 25.3 10/28/2021    C4 31.4 07/17/2020    C4 26.9 08/21/2019    C4 33.4 02/20/2019     Lab Results   Component Value Date    ANTIDSDNAIGG <1 10/28/2021    ANTIDSDNAIGG 1 08/21/2019      No results found for: [facility]     Lab Results   Component Value Date    .3 (H) 10/06/2022    CRP 11.5 (H) 04/21/2022    CRP 17.8 (H) 02/17/2022    CRP 36.2 (H) 10/28/2021     Lab Results   Component Value Date SEDRATE 56 (H) 10/06/2022    SEDRATE 32 (H) 04/21/2022    SEDRATE 38 (H) 02/17/2022    SEDRATE 29 (H) 10/28/2021     No results found for: CKTOTAL     Positive HLA-B27 (1/16/19)  Negative NENITA (10/9/18, 2/20/19, 7/17/20)  Negative SSA, SSB (1/16/19)  Negative hepatitis B and C serologies (1/16/19)  Negative Quantiferon TB (1/16/19) --> indeterminate (7/17/20, 7/23/20)    Imaging:  I personally reviewed interval imaging and discussed w/ the pt in detail which included:    X-ray L-spine (8/6/18): Severe sclerosis of the iliac side of both SI joints w/ serrated appearance of the SI joints. 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 Otherwise normal exam.     MRI C-spine (9/14/15): No signal abnormality within the visualized cord. Slight reversal of the normal cervical lordosis, nonspecific. No prevertebral soft tissue swelling. Vertebral body heights are well-maintained without acute fracture or osseous STIR signal abnormality. Preservation of the disc space heights. No listhesis. No lateralizing disc herniation or significant central canal, lateral recess, or neuroforaminal stenosis. Anterior and posterior longitudinal ligaments are intact. Ligamentum flavum intact. Occipitocervical ligaments intact. No interspinous widening. MRI C-spine (3/6/19):  1. Extensive edema in the posterior elements of the lower C-spine extending from the level of C4-T1, L>R, as well as in the paraspinal, interspinous, and intercostal soft tissues likely related to active enthesopathy related to the pt's Hx of AS. 2. Mild central spinal canal narrowing at C3-C4, through C6-C7. MRI T-spine (9/14/15): IMPRESSION:   No cord signal abnormality, marrow edema, lateralizing disc herniation, or significant stenosis. MRI L-spine (9/14/15): IMPRESSION:   1. No marrow edema, lateralizing disc herniation, or significant stenosis. Mild stenosis, as detailed above.   2. Sclerosis and erosion involving the visualized portion of the SI joints, L greater than R, new from the 2006 MRI, present and better seen on the CT performed August 13, 2014. DDx includes IBD, inflammatory arthropathy (psoriatic arthritis, reactive arthritis), etc.     MRI L-spine (8/14/19, ordered per Pain Management request):  Mild-to-moderate multilevel facet arthropathy, otherwise unremarkable MRI of the lumbar spine. No significant neural foraminal or spinal canal stenosis. MRI L-spine (10/29/21):  RESULT:   Counting reference:  Lumbosacral junction. For the purposes of this report,  L4-5 is considered the level of the iliac crest and assume there are 5 lumbar-type vertebrae. Anatomic variant:  None. Localizer images:  No additional findings. Alignment:    Alignment is anatomic. Bone marrow signal/fracture:  No evidence of pathologic marrow infiltration. No evidence of prior fracture. There is very mild edema along the anterior superior endplates of L5 and L4 vertebral bodies, more prominent at L5. There is minimal intervertebral disc space height loss at L4-L5. There is increased STIR signal intensity within the right pedicle of T12 vertebral body which appears to extend into the right posterior 12th rib. There is also mild pedicular edema at L3 and L4 levels bilaterally. Conus: The conus is within normal limits of signal intensity and   morphology. Paraspinal soft tissues:   Paraspinal soft tissues are within normal limits. Lower thoracic spine:  Visualized lower thoracic canal and foramina are patent. T12-L1:  There is moderate facet joint arthropathy with causes moderate bilateral neural foraminal stenosis. There is no narrowing of the spinal canal.   L1-L2:    Canal and foramina are patent. Mild facet joint arthropathy is seen. L2-L3:    Canal and foramina are patent. Moderate bilateral facet joint   arthropathy is seen.    L3-L4:    There is a central disc protrusion that impresses on the thecal sac and is moderate facet joint arthropathy with slight narrowing of the lateral recesses and mild narrowing of the spinal canal.  There is   moderate bilateral neural foraminal stenosis, slightly worse on the left due to disc bulge and facet joint arthropathy. Mild bilateral facet joint arthropathy is seen. L4-L5:    There is moderate bilateral facet joint arthropathy which causes mild bilateral neural foraminal narrowing. There is no narrowing of the spinal canal.   L5-S1:    Canal and foramina are patent   Sacrum and iliac wings:   Fatty marrow changes are noted along the sacroiliac joints bilaterally. Partially visualized is increased STIR signal intensity within the pelvis as may represent pelvic free fluid. IMPRESSION:   1. There is mild edema along the anterior superior endplates of L4 and L5 vertebra. This finding could represent early osteitis/Romanus lesions with ankylosing spondylitis. 2.  At L4-L5 there is a disc herniation that causes mild narrowing of the spinal canal.  Moderate bilateral neural foraminal narrowing is also seen at this level. 3.  At T12-L1 there is moderate bilateral neural foraminal stenosis due to facet joint arthropathy. 4.  Pedicular edema at T12, L3, and L4 may relate to stress reaction or inflammatory change. 5.  Small amount of pelvic free fluid. If not already performed, a CT of the abdomen and pelvis can be considered. Anatomic Thoracic/Lumbar Variant: None. L4-5 is considered the level of the iliac crest and assume there are 5 lumbar-type vertebrae. MRI pelvis (10/29/21):  COMPARISON: 09/12/2015   TECHNIQUE: T1 weighted and inversion recovery coronal, T1 and fat suppression T2-weighted axial and T1 weighted sagittal imaging of the pelvis and fat suppression T2-weighted coronal oblique imaging of the sacrum were performed.    RESULT:   Bone marrow: Again demonstrated is diffuse marrow signal abnormality about both sacroiliac joints suggesting chronic sacroiliitis. Hip joints: Within normal limits. A physiologic amount of fluid is present within both hip joints. There is no evidence of a labral tear nor paralabral cyst.   Sacroiliac joints: Within normal limits. Pubic symphysis: Within normal limits. Tendons: Within normal limits including the iliopsoas, hamstring, gluteal   and rectus femoris tendons. Muscles: Within normal limits. IMPRESSION:   1. BILATERAL SACROILIITIS WHICH APPEARS CHRONIC. A SERONEGATIVE SPONDYLOARTHROPATHY SUCH AS ANKYLOSING SPONDYLITIS SHOULD BE CONSIDERED. Procedures:   Note from GI (Dr. Nurys Gutierrez) from 8/17/18:  \"Pt w/ CD, R sided colitis at last c-scope in 2011. He has been noncompliant with meds and has not followed up with us until recently. .. Restarted Apriso 1 month back\"     C-scope from 8/17/18:  \"There was erosion, friability, granularity, linear ulcer, loss of vascular marking noted in the colon. There was stigmata bleeding noted. Cold forceps biopsies were obtained for the purpose of histology\". Random colon Bx showed \"focal active colitis, negative for dysplasia\". Above results were discussed w/ the pt in detail during today's visit.

## 2022-11-22 ENCOUNTER — OFFICE VISIT (OUTPATIENT)
Dept: RHEUMATOLOGY | Age: 32
End: 2022-11-22
Payer: MEDICARE

## 2022-11-22 VITALS
DIASTOLIC BLOOD PRESSURE: 78 MMHG | WEIGHT: 121 LBS | SYSTOLIC BLOOD PRESSURE: 120 MMHG | BODY MASS INDEX: 20.77 KG/M2 | HEART RATE: 90 BPM

## 2022-11-22 DIAGNOSIS — K50.90 CROHN'S DISEASE WITHOUT COMPLICATION, UNSPECIFIED GASTROINTESTINAL TRACT LOCATION (HCC): ICD-10-CM

## 2022-11-22 DIAGNOSIS — M45.0 ANKYLOSING SPONDYLITIS OF MULTIPLE SITES IN SPINE (HCC): Primary | ICD-10-CM

## 2022-11-22 DIAGNOSIS — Z79.899 HIGH RISK MEDICATION USE: ICD-10-CM

## 2022-11-22 DIAGNOSIS — L93.0 DISCOID LUPUS: ICD-10-CM

## 2022-11-22 PROCEDURE — G8484 FLU IMMUNIZE NO ADMIN: HCPCS | Performed by: INTERNAL MEDICINE

## 2022-11-22 PROCEDURE — G8420 CALC BMI NORM PARAMETERS: HCPCS | Performed by: INTERNAL MEDICINE

## 2022-11-22 PROCEDURE — G8427 DOCREV CUR MEDS BY ELIG CLIN: HCPCS | Performed by: INTERNAL MEDICINE

## 2022-11-22 PROCEDURE — 99215 OFFICE O/P EST HI 40 MIN: CPT | Performed by: INTERNAL MEDICINE

## 2022-11-22 PROCEDURE — 4004F PT TOBACCO SCREEN RCVD TLK: CPT | Performed by: INTERNAL MEDICINE

## 2022-11-22 RX ORDER — SODIUM CHLORIDE 9 MG/ML
5-250 INJECTION, SOLUTION INTRAVENOUS PRN
OUTPATIENT
Start: 2023-01-16

## 2022-11-22 RX ORDER — FAMOTIDINE 10 MG/ML
20 INJECTION, SOLUTION INTRAVENOUS
OUTPATIENT
Start: 2023-01-16

## 2022-11-22 RX ORDER — METHYLPREDNISOLONE 4 MG/1
TABLET ORAL
Qty: 1 KIT | Refills: 0 | Status: SHIPPED | OUTPATIENT
Start: 2022-11-22 | End: 2022-11-28

## 2022-11-22 RX ORDER — DIPHENHYDRAMINE HYDROCHLORIDE 50 MG/ML
50 INJECTION INTRAMUSCULAR; INTRAVENOUS
OUTPATIENT
Start: 2023-01-16

## 2022-11-22 RX ORDER — TIZANIDINE 4 MG/1
TABLET ORAL
COMMUNITY
Start: 2022-11-07

## 2022-11-22 RX ORDER — SODIUM CHLORIDE 0.9 % (FLUSH) 0.9 %
5-40 SYRINGE (ML) INJECTION PRN
OUTPATIENT
Start: 2023-01-16

## 2022-11-22 RX ORDER — SODIUM CHLORIDE 9 MG/ML
INJECTION, SOLUTION INTRAVENOUS ONCE
OUTPATIENT
Start: 2023-01-16 | End: 2023-01-16

## 2022-11-22 RX ORDER — ONDANSETRON 2 MG/ML
8 INJECTION INTRAMUSCULAR; INTRAVENOUS
OUTPATIENT
Start: 2023-01-16

## 2022-11-22 RX ORDER — AZATHIOPRINE 50 MG/1
100 TABLET ORAL DAILY
Qty: 180 TABLET | Refills: 0 | Status: SHIPPED | OUTPATIENT
Start: 2022-11-22 | End: 2023-02-20

## 2022-11-22 RX ORDER — PREDNISONE 1 MG/1
TABLET ORAL
COMMUNITY
End: 2022-11-22

## 2022-11-22 RX ORDER — MOMETASONE FUROATE 1 MG/G
CREAM TOPICAL
COMMUNITY
Start: 2022-11-02

## 2022-11-22 RX ORDER — SODIUM CHLORIDE 9 MG/ML
INJECTION, SOLUTION INTRAVENOUS CONTINUOUS
OUTPATIENT
Start: 2023-01-16

## 2022-11-22 RX ORDER — GABAPENTIN 600 MG/1
TABLET ORAL
COMMUNITY
Start: 2022-11-03

## 2022-11-22 RX ORDER — HEPARIN SODIUM (PORCINE) LOCK FLUSH IV SOLN 100 UNIT/ML 100 UNIT/ML
500 SOLUTION INTRAVENOUS PRN
OUTPATIENT
Start: 2023-01-16

## 2022-11-22 RX ORDER — ACETAMINOPHEN 325 MG/1
650 TABLET ORAL
OUTPATIENT
Start: 2023-01-16

## 2022-11-22 RX ORDER — ALBUTEROL SULFATE 90 UG/1
4 AEROSOL, METERED RESPIRATORY (INHALATION) PRN
OUTPATIENT
Start: 2023-01-16

## 2022-11-22 RX ORDER — EPINEPHRINE 1 MG/ML
0.3 INJECTION, SOLUTION, CONCENTRATE INTRAVENOUS PRN
OUTPATIENT
Start: 2023-01-16

## 2022-11-22 RX ORDER — HYDROXYZINE HYDROCHLORIDE 25 MG/1
TABLET, FILM COATED ORAL
COMMUNITY
Start: 2022-11-16

## 2022-11-22 RX ORDER — USTEKINUMAB 90 MG/ML
INJECTION, SOLUTION SUBCUTANEOUS
COMMUNITY
Start: 2022-10-20

## 2022-11-22 RX ORDER — HYDROCODONE BITARTRATE AND ACETAMINOPHEN 7.5; 325 MG/1; MG/1
TABLET ORAL
COMMUNITY
Start: 2022-11-07

## 2022-11-22 NOTE — ASSESSMENT & PLAN NOTE
- followed by GI, Dr. Crista Cano in [facility]. - pt stated IBD Sx improved after switching MTX to AZA. Currently well controlled on AZA and Ustekinumab. - switch Ustekinumab to Golimumab for better control of his ankylosing spondylitis. D/w pt and his mother that Golimumab is not FDA approved for the Tx of Crohn's disease. He will carefully monitor his GI Sx on Golimumab and AZA. Discussed plan w/ GI, Dr. Crista Cano who is in agreement.

## 2022-11-22 NOTE — ASSESSMENT & PLAN NOTE
- stable[email or web address] - no evidence of SLE to date. - cont  mg daily.

## 2022-11-22 NOTE — ASSESSMENT & PLAN NOTE
- I d/w the pt that TNF-INHIBITORS can cause increased risk of TB reactivation, oppurtunistic infections, lupus like illness, drug induced lupus, rash, hypersensitivity reaction, infusion reactions, demyelinating disease and possible risk of malignancies and lung diseases. I answered all of the pt's questions to the best of my knowledge and provided the pt a detailed handout on Golimumab. - previously had indeterminate Quantiferon TB test x 2 in 7/2020. He was evaluated by Alessandra Wells, Dr. Odalys Villa and cleared to start TNFi on 8/27/20.   - labs for AZA Q3mo. - the risks, benefits, and alternatives to the use of Prednisone were discussed w/ the pt. Side effects included, but were not limited development of hypertension, hyperglycemia or diabetes mellitus, peptic ulcer disease, cataracts and glaucoma, osteoporosis and risk for fractures, weight gain, cushingoid appearance, avascular necrosis, mood changes including depression and euphoria, the development of a decreased ability to fight off infections. Rare side effects like AVN are seen with long term use of steroids as also, are complications associated with withdrawing from the drug abruptly.

## 2022-11-22 NOTE — ASSESSMENT & PLAN NOTE
- previously improved but remained active on Certolizumab 647 mg SC Q4wk. Pt reported chronic inflammatory LBP w/ costochondritis on Certolizumab, his CRP improved but still remained mildly elevated on Certolizumab. Most recent MRI of L-spine and pelvis studies from 10/29/21 showed active axial disease. There was an issue w/ possible injection error w/ Certolizumab. He was switched to Ustekinumab in 5/2022 and reports uncontrolled axial Sx on Ustekinumab. CRP elevated to 104.3 on 10/6/22.  - repeat MRI L-spine and pelvis have been ordered to evaluate the activity of his ankylosing spondylitis on Ustekinumab. - he developed an allergic reaction to SSZ. - will plan on switching Ustekinumab to Golimumab start 2 mg/kg/dose IV x 1 on wk 0 and 4 followed by 2 mg/kg/dose IV Q8wk. Placed therapy plan, pt agreed to come to [facility] for infusions. - start steroid taper now. - previously noncompliant w/ AZA, I am unclear if he's been taking this consistently. We discussed switching back to MTX for better control of his peripheral arthritis, pt declined. Refilled AZA. - pt following w/ Pain Management in [place].

## 2022-12-06 ENCOUNTER — HOSPITAL ENCOUNTER (OUTPATIENT)
Dept: MRI IMAGING | Age: 32
Discharge: HOME | End: 2022-12-06
Payer: MEDICARE

## 2022-12-06 DIAGNOSIS — K50.90 CROHN'S DISEASE WITHOUT COMPLICATION, UNSPECIFIED GASTROINTESTINAL TRACT LOCATION (HCC): ICD-10-CM

## 2022-12-06 DIAGNOSIS — Z79.899 HIGH RISK MEDICATION USE: ICD-10-CM

## 2022-12-06 DIAGNOSIS — M45.0 ANKYLOSING SPONDYLITIS OF MULTIPLE SITES IN SPINE (HCC): ICD-10-CM

## 2022-12-06 LAB
ALT SERPL-CCNC: <5 U/L (ref 10–40)
AST SERPL-CCNC: 12 U/L (ref 15–37)
BASOPHILS ABSOLUTE: 0 K/UL (ref 0–0.2)
BASOPHILS RELATIVE PERCENT: 0.9 %
CREAT SERPL-MCNC: 0.7 MG/DL (ref 0.9–1.3)
EOSINOPHILS ABSOLUTE: 0.2 K/UL (ref 0–0.6)
EOSINOPHILS RELATIVE PERCENT: 3.7 %
GFR SERPL CREATININE-BSD FRML MDRD: >60 ML/MIN/{1.73_M2}
HCT VFR BLD CALC: 33.5 % (ref 40.5–52.5)
HEMOGLOBIN: 10.9 G/DL (ref 13.5–17.5)
LYMPHOCYTES ABSOLUTE: 1.4 K/UL (ref 1–5.1)
LYMPHOCYTES RELATIVE PERCENT: 27.6 %
MCH RBC QN AUTO: 30.1 PG (ref 26–34)
MCHC RBC AUTO-ENTMCNC: 32.5 G/DL (ref 31–36)
MCV RBC AUTO: 92.6 FL (ref 80–100)
MONOCYTES ABSOLUTE: 0.3 K/UL (ref 0–1.3)
MONOCYTES RELATIVE PERCENT: 6 %
NEUTROPHILS ABSOLUTE: 3.2 K/UL (ref 1.7–7.7)
NEUTROPHILS RELATIVE PERCENT: 61.8 %
PDW BLD-RTO: 15.8 % (ref 12.4–15.4)
PLATELET # BLD: 333 K/UL (ref 135–450)
PMV BLD AUTO: 7.4 FL (ref 5–10.5)
RBC # BLD: 3.62 M/UL (ref 4.2–5.9)
SEDIMENTATION RATE, ERYTHROCYTE: 88 MM/HR (ref 0–15)
WBC # BLD: 5.2 K/UL (ref 4–11)

## 2022-12-06 PROCEDURE — 6360000004 HC RX CONTRAST MEDICATION: Performed by: INTERNAL MEDICINE

## 2022-12-06 PROCEDURE — 72197 MRI PELVIS W/O & W/DYE: CPT

## 2022-12-06 PROCEDURE — 72158 MRI LUMBAR SPINE W/O & W/DYE: CPT

## 2022-12-06 PROCEDURE — A9579 GAD-BASE MR CONTRAST NOS,1ML: HCPCS | Performed by: INTERNAL MEDICINE

## 2022-12-06 RX ADMIN — GADOTERIDOL 12 ML: 279.3 INJECTION, SOLUTION INTRAVENOUS at 13:38

## 2022-12-07 LAB — C-REACTIVE PROTEIN: 57.6 MG/L (ref 0–5.1)

## 2022-12-08 LAB
QUANTI TB GOLD PLUS: NEGATIVE
QUANTI TB1 MINUS NIL: 0 IU/ML (ref 0–0.34)
QUANTI TB2 MINUS NIL: 0 IU/ML (ref 0–0.34)
QUANTIFERON MITOGEN: 0.52 IU/ML
QUANTIFERON NIL: 0.02 IU/ML

## 2022-12-08 NOTE — RESULT ENCOUNTER NOTE
MRI studies and labs indicate active ankylosing spondylitis on Stelara. I placed therapy plan for Yoly Ognoreen Ren - please f/u on PA, needs to get started on this asap.

## 2022-12-09 NOTE — RESULT ENCOUNTER NOTE
Please inform pt - he should wait 2 months after his last Stelara injection before starting the new Simponia aria infusion.

## 2023-01-09 NOTE — PROGRESS NOTES
Manuel Damon MD  [address] - Rheumatology    [] [facility]:  [facility] Dr. Madrigal [address] [facility] [x] [place] Office:  [address]   Office: [phone]  Fax: [phone]     RHEUMATOLOGY PROGRESS NOTE    ASSESSMENT/PLAN:  Vera Coe is a 34 y.o. male w/ AS (+HLA B27, elevated inflammatory markers, chronic inflammatory back pain, MRI evidence of active enthesopathy b/l sacroiliitis w/ peripheral involvement, Hx of plantar fasciitis), Crohn's disease (follows w/ GI, Dr. Alesia Dillon) and Bx proven discoid lupus (follows w/ Dermatologist, Dr. Prieto Solomon). PMHx pertinent for HTN, PTSD and anxiety. Current rheum meds:  mg daily: started in 11/2021  Ustekinumab 90 mg SC Q8wk: per GI, received induction dose in 5/2022, received most recent dose in 11/2022  Gabapentin 400 mg TID and Hydrocodone-Acetaminophen: managed by Pain Management [facility])  Calcium citrate 1000 mg daily  Clobetasol 0.05% ointment BID (per Dermatology)     Prior rheum meds:  MTX 20 mg/wk: started in 6/2020, switched to AZA in 11/2021 d/t active IBD  SSZ 1000 mg BID: tried on 10/2022, took for one month, developed allergic reaction  Adalimumab 40 mg SC Q2wk: cleared by Derm and GI, took from 3/15/19 to 11/19, switched to Remicade d/t partial response, elevated CRP  Infliximab 8 mg/kg IV Q6wk, received 1st dose on 12/30/19, developed infusion reaction during 5th infusion on 3/56/27  Certolizumab 751 mg SC Q4wk: took from 9/2020-5/2022, switched to Ustekinumab   Mesalamine (GI): pt decided to d/c in 11/19  Meloxicam 7.5 mg daily: prescribed by Pain Management, instructed pt to d/c d/t IBD    1. Ankylosing spondylitis of multiple sites in spine [facility])  Assessment & Plan:  - previously improved but remained active on Certolizumab 826 mg SC Q4wk.  Pt reported chronic inflammatory LBP w/ costochondritis on Certolizumab, his CRP improved but still remained mildly elevated on Certolizumab. Most recent MRI of L-spine and pelvis studies from 10/29/21 showed active axial disease. There was an issue w/ possible injection error w/ Certolizumab. He was switched to Ustekinumab in 5/2022 and reports uncontrolled axial Sx on Ustekinumab. CRP elevated to 104.3 on 10/6/22.  - most recent MRI L-spine and pelvis from 10/6/22 showed active axial disease.  - plan was to switch Ustekinumab to Golimumab start 2 mg/kg/dose IV x 1 on wk 0 and 4 followed by 2 mg/kg/dose IV Q8wk. We obtained approval for Golimumab to be infused at [address] however our office will be closing. As such pt agreed to resume an injectable TNF-I until he establishes care w/ me at my new office location. Pt preferred to resume Adalimumab 40 mg SC [id number]. - cont AZA. - prescribed Prednisone tapers. - pt following w/ Pain Management in [place]. Orders:  -     C-Reactive Protein; Future  -     Sedimentation Rate; Future  2. Crohn's disease without complication, unspecified gastrointestinal tract location [facility])  Assessment & Plan:  - followed by GI, Dr. Varinder Castelan in [place]. - pt stated IBD Sx improved after switching MTX to AZA. Currently well controlled on AZA and Ustekinumab. - switch Ustekinumab to Adalimumab as above #1. Orders:  -     C-Reactive Protein; Future  -     Sedimentation Rate; Future  3. Discoid lupus  Assessment & Plan:  - stable, following w/ Dermatology in [place]. - no evidence of SLE to date. - cont  mg daily. 4. High risk medication use  Assessment & Plan:  - I d/w the pt that TNF-INHIBITORS can cause increased risk of TB reactivation, oppurtunistic infections, lupus like illness, drug induced lupus, rash, hypersensitivity reaction, infusion reactions, demyelinating disease and possible risk of malignancies and lung diseases. I answered all of the pt's questions to the best of my knowledge and provided the pt a detailed handout on Adalimumab.   - previously had indeterminate Quantiferon TB test x 2 in 7/2020. He was evaluated by Dr. Susan Turner and cleared to start TNFi on 8/27/20. Most recent Quantiferon TB test was negative on 12/6/22.  - labs for AZA Q3mo. - the risks, benefits, and alternatives to the use of Prednisone were discussed w/ the pt. Side effects included, but were not limited development of hypertension, hyperglycemia or diabetes mellitus, peptic ulcer disease, cataracts and glaucoma, osteoporosis and risk for fractures, weight gain, cushingoid appearance, avascular necrosis, mood changes including depression and euphoria, the development of a decreased ability to fight off infections. Rare side effects like AVN are seen with long term use of steroids as also, are complications associated with withdrawing from the drug abruptly. Orders:  -     ALT; Future  -     AST; Future  -     Creatinine; Future  -     CBC with Auto Differential; Future     Return in about 3 months (around 4/12/2023) for lab result discussion and treatment plan, medication monitoring. High complexity case. TIME SPENT TODAY:  I spent over 40 minutes of face-to-face time with the pt (including taking interval history and performing physical exam, review of medical records, independently interpreting results and communicating results to the pt/family/caregiver, counseling and educating the pt/family/caregiver, implementation of treatment plan, coordination of care including discussion of case w/ GI specialist, documenting clinical information in the EMR) during today visit. At least 50% of this time was spent in counseling, explanation of diagnosis, planning of further management, and coordination of care. The risks and benefits of my recommendations, as well as other treatment options, benefits and side effects were discussed with the patient today. Questions were answered. NOTE: This report is transcribed by using voice recognition software dragon.  Every effort is made to ensure accuracy; however, inadvertent computerized  transcription errors may be present. SUBJECTIVE:  Past medical/surgical history, medications and allergies are reviewed and updated as appropriate. Interval Hx:   Pt reports chronic pain and stiffness in his neck and low back. Morning stiffness lasts several hrs. He reports positional numbness in the lateral aspect of his thighs - Sx resolve when he turns over in bed. Denies subjective leg weakness, bowel or bladder incontinence. He denies any active Sx of IBD. He reports compliance w/ AZA. Pt states he developed itchy hives on his trunk, extremities and ears after increasing his SSZ dose to 1000 mg BID. He also recalls having lip swelling. Denies SOB or wheezing. He was seen at outside ED on 11/15/22 and received Diphenhydramine for possible allergic reaction to SSZ. He was prescribed a Medrol Dosepak by us last wk but did not fill this Rx. He was prescribed Hydroxyzine by his PCP 5 days ago. Pt states most of his hives and lip swelling have completely resolved. Pt reports chronic inflammatory LBP. He does not think Ustekinumab is working. We have received approval for Golimumab. Pt states he took his most recent Ustekinumab injection in 11/2022.     Rheumatologic ROS:  Constitutional: denies fever/chills, night sweats, unintentional weight loss  Integumentary: +photosensitivity w/ chronic discoid lupus no new lesions and chronic diffuse alopecia, denies Sx of Raynaud's phenomenon  Eyes: denies dry eyes, redness or pain  Oral cavity: denies recent oral ulcers, dry mouth or thrush  Cardiovascular: denies CP, palpitations, Hx of pericardial effusion or pericarditis  Respiratory: denies SOB, cough, hemoptysis, or pleurisy  Gastrointestinal: IBD Sx as above HPI  Musculoskeletal:  refer to above HPI     Allergies   Allergen Reactions    Sulfa Antibiotics Anaphylaxis, Hives and Swelling     Sulfasalazine    Remicade [Infliximab] Other (See Comments)       Past Medical History:    History reviewed. No pertinent past medical history. Past Surgical History:    No past surgical history on file. Medications:    Current Outpatient Medications   Medication Sig Dispense Refill    azaTHIOprine (IMURAN) 50 MG tablet Take 2 tablets by mouth daily 180 tablet 1    Adalimumab (HUMIRA PEN) 40 MG/0.4ML PNKT Inject 40 mg into the skin every 14 days CITRATE FREE. 6 each 1    predniSONE (DELTASONE) 5 MG tablet Take 6 tablets by mouth every morning for 4 days, THEN 4 tablets every morning for 4 days, THEN 2 tablets every morning for 4 days, THEN 1 tablet every morning for 4 days. 52 tablet 3    gabapentin (NEURONTIN) 600 MG tablet       HYDROcodone-acetaminophen (NORCO) 7.5-325 MG per tablet       hydrOXYzine HCl (ATARAX) 25 MG tablet       mometasone (ELOCON) 0.1 % cream       tiZANidine (ZANAFLEX) 4 MG tablet       STELARA 90 MG/ML SOSY prefilled syringe       fluocinolone 0.01 % cream Apply topically 2 times daily      hydrocortisone 0.5 % cream Apply topically 2 times daily Apply topically 2 times daily. amLODIPine (NORVASC) 10 MG tablet Take 10 mg by mouth daily       No current facility-administered medications for this visit.         OBJECTIVE:  Physical Exam:  /80   Pulse 74   Wt 125 lb (56.7 kg)   BMI 21.46 kg/m²     GEN: AAOx3, in NAD, well-appearing, accompanied by mother  HEAD: normocephalic, atraumatic  EYES: no injection or icterus  CVS: RRR  LUNGS: in no acute respiratory distress  MSK:  Upper extremities:              Hands: no active synovitis or dactylitis, joints NTTP, full fist formation w/ strong  strength              Wrist: no synovitis in the wrist joints b/l, FROM   Elbow: no synovitis of the elbow joints or olecranon bursitis  Lower extremities:              Knees: no warmth or effusion present, FROM              Ankles: no synovitis, FROM              Feet: no toe swelling or pain or warmth on palpation w/ FROM, negative MTP squeeze test  INTEGUMENT: no visible urticaria or rash, hypopigmented scars and macules on face, inner ear, and arms, there is diffuse hair thinning, no petechiae, bruises, or palpable purpura    DATA:  Labs:  I personally reviewed interval labs and discussed w/ the pt in detail which showed:    Lab Results   Component Value Date    WBC 5.2 12/06/2022    HGB 10.9 (L) 12/06/2022    HCT 33.5 (L) 12/06/2022    MCV 92.6 12/06/2022     12/06/2022    LYMPHOPCT 27.6 12/06/2022    RBC 3.62 (L) 12/06/2022    MCH 30.1 12/06/2022    MCHC 32.5 12/06/2022    RDW 15.8 (H) 12/06/2022     Lab Results   Component Value Date     10/28/2021    K 4.2 10/28/2021     10/28/2021    CO2 26 10/28/2021    BUN 10 10/28/2021    CREATININE 0.7 (L) 12/06/2022    GLUCOSE 82 10/28/2021    CALCIUM 9.0 10/28/2021    PROT 6.2 (L) 04/21/2022    LABALBU 3.9 04/21/2022    BILITOT <0.2 04/21/2022    ALKPHOS 67 04/21/2022    AST 12 (L) 12/06/2022    ALT <5 (L) 12/06/2022    LABGLOM >60 12/06/2022    GFRAA >60 10/06/2022    AGRATIO 1.4 10/28/2021    GLOB 3.1 05/05/2020     Lab Results   Component Value Date    COLORU YELLOW 10/28/2021    CLARITYU CLOUDY (A) 10/28/2021    GLUCOSEU Negative 10/28/2021    BILIRUBINUR Negative 10/28/2021    KETUA Negative 10/28/2021    SPECGRAV 1.022 10/28/2021    BLOODU Negative 10/28/2021    PHUR 6.5 10/28/2021    PROTEINU Negative 10/28/2021    UROBILINOGEN 0.2 10/28/2021    NITRU Negative 10/28/2021    LEUKOCYTESUR Negative 10/28/2021    LABMICR YES 10/28/2021    URINETYPE NotGiven 10/28/2021    HYALCAST 0 10/28/2021    WBCUA 1 10/28/2021    RBCUA 1 10/28/2021    EPIU 0 10/28/2021     Lab Results   Component Value Date    VITD25 96.5 02/17/2022     Lab Results   Component Value Date    C3 131.8 10/28/2021    C3 158.1 07/17/2020    C3 138.4 08/21/2019    C3 150.2 02/20/2019     Lab Results   Component Value Date    C4 25.3 10/28/2021    C4 31.4 07/17/2020    C4 26.9 08/21/2019    C4 33.4 02/20/2019  Lab Results   Component Value Date    ANTIDSDNAIGG <1 10/28/2021    ANTIDSDNAIGG 1 08/21/2019      No results found for: [facility]     Lab Results   Component Value Date    CRP 57.6 (H) 12/06/2022    .3 (H) 10/06/2022    CRP 11.5 (H) 04/21/2022    CRP 17.8 (H) 02/17/2022     Lab Results   Component Value Date    SEDRATE 88 (H) 12/06/2022    SEDRATE 56 (H) 10/06/2022    SEDRATE 32 (H) 04/21/2022    SEDRATE 38 (H) 02/17/2022     No results found for: CKTOTAL     Positive HLA-B27 (1/16/19)  Negative NENITA (10/9/18, 2/20/19, 7/17/20)  Negative SSA, SSB (1/16/19)  Negative hepatitis B and C serologies (1/16/19)  Negative Quantiferon TB (12/6/22)    Imaging:  I personally reviewed interval imaging and discussed w/ the pt in detail which included:    X-ray L-spine (8/6/18): Severe sclerosis of the iliac side of both SI joints w/ serrated appearance of the SI joints. 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 Otherwise normal exam.     MRI C-spine (9/14/15): No signal abnormality within the visualized cord. Slight reversal of the normal cervical lordosis, nonspecific. No prevertebral soft tissue swelling. Vertebral body heights are well-maintained without acute fracture or osseous STIR signal abnormality. Preservation of the disc space heights. No listhesis. No lateralizing disc herniation or significant central canal, lateral recess, or neuroforaminal stenosis. Anterior and posterior longitudinal ligaments are intact. Ligamentum flavum intact. Occipitocervical ligaments intact. No interspinous widening. MRI C-spine (3/6/19):  1. Extensive edema in the posterior elements of the lower C-spine extending from the level of C4-T1, L>R, as well as in the paraspinal, interspinous, and intercostal soft tissues likely related to active enthesopathy related to the pt's Hx of AS. 2. Mild central spinal canal narrowing at C3-C4, through C6-C7.       MRI T-spine (9/14/15): IMPRESSION:   No cord signal abnormality, marrow edema, lateralizing disc herniation, or significant stenosis. MRI L-spine (9/14/15): IMPRESSION:   1. No marrow edema, lateralizing disc herniation, or significant stenosis. Mild stenosis, as detailed above. 2. Sclerosis and erosion involving the visualized portion of the SI joints, L greater than R, new from the 2006 MRI, present and better seen on the CT performed August 13, 2014. DDx includes IBD, inflammatory arthropathy (psoriatic arthritis, reactive arthritis), etc.     MRI L-spine (8/14/19, ordered per Pain Management request):  Mild-to-moderate multilevel facet arthropathy, otherwise unremarkable MRI of the lumbar spine. No significant neural foraminal or spinal canal stenosis. MRI L-spine (10/29/21):  RESULT:   Counting reference:  Lumbosacral junction. For the purposes of this report,  L4-5 is considered the level of the iliac crest and assume there are 5 lumbar-type vertebrae. Anatomic variant:  None. Localizer images:  No additional findings. Alignment:    Alignment is anatomic. Bone marrow signal/fracture:  No evidence of pathologic marrow infiltration. No evidence of prior fracture. There is very mild edema along the anterior superior endplates of L5 and L4 vertebral bodies, more prominent at L5. There is minimal intervertebral disc space height loss at L4-L5. There is increased STIR signal intensity within the right pedicle of T12 vertebral body which appears to extend into the right posterior 12th rib. There is also mild pedicular edema at L3 and L4 levels bilaterally. Conus: The conus is within normal limits of signal intensity and   morphology. Paraspinal soft tissues:   Paraspinal soft tissues are within normal limits. Lower thoracic spine:  Visualized lower thoracic canal and foramina are patent.    T12-L1:  There is moderate facet joint arthropathy with causes moderate bilateral neural foraminal stenosis. There is no narrowing of the spinal canal.   L1-L2:    Canal and foramina are patent. Mild facet joint arthropathy is seen. L2-L3:    Canal and foramina are patent. Moderate bilateral facet joint   arthropathy is seen. L3-L4:    There is a central disc protrusion that impresses on the thecal sac and is moderate facet joint arthropathy with slight narrowing of the lateral recesses and mild narrowing of the spinal canal.  There is   moderate bilateral neural foraminal stenosis, slightly worse on the left due to disc bulge and facet joint arthropathy. Mild bilateral facet joint arthropathy is seen. L4-L5:    There is moderate bilateral facet joint arthropathy which causes mild bilateral neural foraminal narrowing. There is no narrowing of the spinal canal.   L5-S1:    Canal and foramina are patent   Sacrum and iliac wings:   Fatty marrow changes are noted along the sacroiliac joints bilaterally. Partially visualized is increased STIR signal intensity within the pelvis as may represent pelvic free fluid. IMPRESSION:   1. There is mild edema along the anterior superior endplates of L4 and L5 vertebra. This finding could represent early osteitis/Romanus lesions with ankylosing spondylitis. 2.  At L4-L5 there is a disc herniation that causes mild narrowing of the spinal canal.  Moderate bilateral neural foraminal narrowing is also seen at this level. 3.  At T12-L1 there is moderate bilateral neural foraminal stenosis due to facet joint arthropathy. 4.  Pedicular edema at T12, L3, and L4 may relate to stress reaction or inflammatory change. 5.  Small amount of pelvic free fluid. If not already performed, a CT of the abdomen and pelvis can be considered. Anatomic Thoracic/Lumbar Variant: None. L4-5 is considered the level of the iliac crest and assume there are 5 lumbar-type vertebrae.      MRI pelvis (10/29/21):  COMPARISON: 09/12/2015   TECHNIQUE: T1 weighted and inversion recovery coronal, T1 and fat suppression T2-weighted axial and T1 weighted sagittal imaging of the pelvis and fat suppression T2-weighted coronal oblique imaging of the sacrum were performed. RESULT:   Bone marrow: Again demonstrated is diffuse marrow signal abnormality about both sacroiliac joints suggesting chronic sacroiliitis. Hip joints: Within normal limits. A physiologic amount of fluid is present within both hip joints. There is no evidence of a labral tear nor paralabral cyst.   Sacroiliac joints: Within normal limits. Pubic symphysis: Within normal limits. Tendons: Within normal limits including the iliopsoas, hamstring, gluteal   and rectus femoris tendons. Muscles: Within normal limits. IMPRESSION:   1. BILATERAL SACROILIITIS WHICH APPEARS CHRONIC. A SERONEGATIVE SPONDYLOARTHROPATHY SUCH AS ANKYLOSING SPONDYLITIS SHOULD BE CONSIDERED. MRI L spine (10/6/22): FINDINGS:     CONUS: Conus is normal signal and terminates at L1-L2 disc level. ALIGNMENT: Normal.     BONES: Vertebral body heights are normal. There is mild edema and enhancement involving anterior corners of lumbar vertebral bodies. There is also edema and enhancement involving posterior elements, including pedicles, articular pillars and spinous processes the varying degrees at T12-L4. Pelvic bones and sacroiliac joints reported separately. RETROPERITONEUM: Normal.     PARASPINAL: Normal paraspinal muscle signal for age. L1-L2: Normal intervertebral disc. Facet osteophytosis, no spinal stenosis     L2-L3: Normal intervertebral discs. Moderate bilateral facet arthritis. No spinal stenosis. L3-L4: No compressive disc abnormality. Moderate to severe facet arthritis. Mild bilateral foraminal stenosis, no central stenosis     L4-L5: Mild intervertebral disc desiccation. Small central disc protrusion mildly compresses thecal sac. No nerve root impingement. Moderate to severe facet osteoarthritis.  Mild bilateral foraminal stenosis. L5-S1: No compressive abnormality or spinal stenosis. L5 is sacralized. IMPRESSION:  1. Bone marrow edema and enhancement involving the anterior corners of lumbar vertebral bodies as well as variable involvement of posterior elements of lumbar vertebrae compatible with active inflammatory spondyloarthropathy and consistent with patient's  clinical history of ankylosing spondylitis. 2. Multilevel lumbar facet osteoarthritis   3. MRI pelvis reported separately     MRI pelvis (10/6/22): FINDINGS:   ARTICULATIONS   Hips: No discrete cartilage loss. No joint effusion or synovitis. Pubic symphysis: Well maintained. No joint effusion or synovitis. Sacroiliac joints: There is subchondral T2 hyperintense marrow edema which is asymmetrically involving the left greater than right SI joints. There is also some subchondral surface irregularity involving the bilateral sacroiliac joints as well as some fatty marrow signal changes consistent with chronic involvement. No significant joint effusions. No definite bony ankylosis identified. Osseous structures: Marrow edema in the sacroiliac joints as detailed above. There is no evidence of acute fracture. No suspicious marrow signal abnormality. Tendons: Intact. Musculature: Normal volume and signal characteristics. Soft tissues, Bursa: No significant abnormality. Neurovascular, lymph nodes: No significant abnormality. Intrapelvic included structures, viscera: Trace free fluid in the pelvis. Limited evaluation of bowel with no dilated bowel loops identified. There is suspected wall thickening involving the distal ileum seen on series 12 image 20. IMPRESSION    1. Findings of bilateral sacroiliitis with marrow edema as well as fatty marrow signal changes suggesting chronic component. Marrow edema involves the left greater than right sacroiliac joints.  This may represent inflammatory bowel disease related sacroiliitis given history and additional findings. 2.  Circumferential small bowel wall thickening involving the terminal ileum likely relates to underlying history of Crohn's disease. 3.  Trace ascites. Procedures:   Note from GI (Dr. Odalys Costello) from 8/17/18:  \"Pt w/ CD, R sided colitis at last c-scope in 2011. He has been noncompliant with meds and has not followed up with us until recently. .. Restarted Apriso 1 month back\"     C-scope from 8/17/18:  \"There was erosion, friability, granularity, linear ulcer, loss of vascular marking noted in the colon. There was stigmata bleeding noted. Cold forceps biopsies were obtained for the purpose of histology\". Random colon Bx showed \"focal active colitis, negative for dysplasia\". Above results were discussed w/ the pt in detail during today's visit.

## 2023-01-12 ENCOUNTER — OFFICE VISIT (OUTPATIENT)
Dept: RHEUMATOLOGY | Age: 33
End: 2023-01-12
Payer: COMMERCIAL

## 2023-01-12 ENCOUNTER — TELEPHONE (OUTPATIENT)
Dept: RHEUMATOLOGY | Age: 33
End: 2023-01-12

## 2023-01-12 VITALS
BODY MASS INDEX: 21.46 KG/M2 | SYSTOLIC BLOOD PRESSURE: 112 MMHG | HEART RATE: 74 BPM | DIASTOLIC BLOOD PRESSURE: 80 MMHG | WEIGHT: 125 LBS

## 2023-01-12 DIAGNOSIS — Z79.899 HIGH RISK MEDICATION USE: ICD-10-CM

## 2023-01-12 DIAGNOSIS — K50.90 CROHN'S DISEASE WITHOUT COMPLICATION, UNSPECIFIED GASTROINTESTINAL TRACT LOCATION (HCC): ICD-10-CM

## 2023-01-12 DIAGNOSIS — L93.0 DISCOID LUPUS: ICD-10-CM

## 2023-01-12 DIAGNOSIS — M45.0 ANKYLOSING SPONDYLITIS OF MULTIPLE SITES IN SPINE (HCC): Primary | ICD-10-CM

## 2023-01-12 PROCEDURE — G8427 DOCREV CUR MEDS BY ELIG CLIN: HCPCS | Performed by: INTERNAL MEDICINE

## 2023-01-12 PROCEDURE — G8484 FLU IMMUNIZE NO ADMIN: HCPCS | Performed by: INTERNAL MEDICINE

## 2023-01-12 PROCEDURE — 99215 OFFICE O/P EST HI 40 MIN: CPT | Performed by: INTERNAL MEDICINE

## 2023-01-12 PROCEDURE — G8420 CALC BMI NORM PARAMETERS: HCPCS | Performed by: INTERNAL MEDICINE

## 2023-01-12 PROCEDURE — 4004F PT TOBACCO SCREEN RCVD TLK: CPT | Performed by: INTERNAL MEDICINE

## 2023-01-12 RX ORDER — PREDNISONE 1 MG/1
TABLET ORAL
Qty: 52 TABLET | Refills: 3 | Status: SHIPPED | OUTPATIENT
Start: 2023-01-12 | End: 2023-01-28

## 2023-01-12 RX ORDER — ADALIMUMAB 40MG/0.4ML
40 KIT SUBCUTANEOUS
Qty: 6 EACH | Refills: 1 | Status: SHIPPED | OUTPATIENT
Start: 2023-01-12 | End: 2023-04-12

## 2023-01-12 RX ORDER — AZATHIOPRINE 50 MG/1
100 TABLET ORAL DAILY
Qty: 180 TABLET | Refills: 1 | Status: SHIPPED | OUTPATIENT
Start: 2023-01-12 | End: 2023-04-12

## 2023-01-12 NOTE — ASSESSMENT & PLAN NOTE
- followed by GI, Dr. Uday Acevedo in [place]. - pt stated IBD Sx improved after switching MTX to AZA. Currently well controlled on AZA and Ustekinumab. - switch Ustekinumab to Adalimumab as above #1.

## 2023-01-12 NOTE — ASSESSMENT & PLAN NOTE
- I d/w the pt that TNF-INHIBITORS can cause increased risk of TB reactivation, oppurtunistic infections, lupus like illness, drug induced lupus, rash, hypersensitivity reaction, infusion reactions, demyelinating disease and possible risk of malignancies and lung diseases. I answered all of the pt's questions to the best of my knowledge and provided the pt a detailed handout on Adalimumab. - previously had indeterminate Quantiferon TB test x 2 in 7/2020. He was evaluated by [facility], Dr. María Garcia and cleared to start TNFi on 8/27/20. Most recent Quantiferon TB test was negative on 12/6/22.  - labs for AZA Q3mo. - the risks, benefits, and alternatives to the use of Prednisone were discussed w/ the pt. Side effects included, but were not limited development of hypertension, hyperglycemia or diabetes mellitus, peptic ulcer disease, cataracts and glaucoma, osteoporosis and risk for fractures, weight gain, cushingoid appearance, avascular necrosis, mood changes including depression and euphoria, the development of a decreased ability to fight off infections. Rare side effects like AVN are seen with long term use of steroids as also, are complications associated with withdrawing from the drug abruptly.

## 2023-01-24 ENCOUNTER — CLINICAL DOCUMENTATION (OUTPATIENT)
Dept: ONCOLOGY | Age: 33
End: 2023-01-24

## 2023-01-24 NOTE — CASE COMMUNICATION
Pt did not show for appointment today. RN called pt who states Dr. Melissa Rondon told him not to come for his infusion today; that they would start treatment after she is established with her new office.

## 2023-01-25 ENCOUNTER — TELEPHONE (OUTPATIENT)
Dept: RHEUMATOLOGY | Age: 33
End: 2023-01-25

## 2023-01-25 NOTE — TELEPHONE ENCOUNTER
Yes, I am switching him to Humira. I discontinued his therapy plan for his Golimumab infusion. Please inform pt that I have extended my stay with [facility] through March and I would like to see him back for f/u at the end of March. Please f/u on Humira PA.

## 2023-01-25 NOTE — TELEPHONE ENCOUNTER
Spoke with Liz Wild, Patients mother. He was scheduled for an appt today and was just seen 1/12/23. That visit advised to follow up in 3 months. Appt cancelled for today. He No showed an infusion for 1/24/23. She reports that the MD wanted to stop those infusions and place him on Humira. Please advise. Infusion center will need to be notified and plan should be discontinued to be discontinued.      Infusion Center ph# [phone]

## 2023-01-25 NOTE — TELEPHONE ENCOUNTER
Spoke To infusion Department and cancelled plan. LM for patients mother Confirming cancellation of infusions and starting Humira.

## 2023-02-14 ENCOUNTER — OFFICE (OUTPATIENT)
Dept: URBAN - METROPOLITAN AREA CLINIC 18 | Facility: CLINIC | Age: 33
End: 2023-02-14

## 2023-02-14 VITALS
HEIGHT: 65 IN | HEART RATE: 70 BPM | WEIGHT: 125 LBS | DIASTOLIC BLOOD PRESSURE: 82 MMHG | SYSTOLIC BLOOD PRESSURE: 140 MMHG

## 2023-02-14 DIAGNOSIS — M45.7 ANKYLOSING SPONDYLITIS OF LUMBOSACRAL REGION: ICD-10-CM

## 2023-02-14 DIAGNOSIS — K50.111 CROHN'S DISEASE OF LARGE INTESTINE WITH RECTAL BLEEDING: ICD-10-CM

## 2023-02-14 PROCEDURE — 99214 OFFICE O/P EST MOD 30 MIN: CPT | Performed by: INTERNAL MEDICINE

## 2023-03-29 ENCOUNTER — OFFICE VISIT (OUTPATIENT)
Dept: RHEUMATOLOGY | Age: 33
End: 2023-03-29
Payer: COMMERCIAL

## 2023-03-29 VITALS
SYSTOLIC BLOOD PRESSURE: 102 MMHG | WEIGHT: 128 LBS | BODY MASS INDEX: 21.97 KG/M2 | DIASTOLIC BLOOD PRESSURE: 66 MMHG | HEART RATE: 60 BPM

## 2023-03-29 DIAGNOSIS — Z79.899 HIGH RISK MEDICATION USE: ICD-10-CM

## 2023-03-29 DIAGNOSIS — K50.90 CROHN'S DISEASE WITHOUT COMPLICATION, UNSPECIFIED GASTROINTESTINAL TRACT LOCATION (HCC): ICD-10-CM

## 2023-03-29 DIAGNOSIS — M45.0 ANKYLOSING SPONDYLITIS OF MULTIPLE SITES IN SPINE (HCC): Primary | ICD-10-CM

## 2023-03-29 DIAGNOSIS — M45.0 ANKYLOSING SPONDYLITIS OF MULTIPLE SITES IN SPINE (HCC): ICD-10-CM

## 2023-03-29 DIAGNOSIS — L93.0 DISCOID LUPUS: ICD-10-CM

## 2023-03-29 LAB
ALT SERPL-CCNC: 36 U/L (ref 10–40)
ANISOCYTOSIS BLD QL SMEAR: ABNORMAL
AST SERPL-CCNC: 12 U/L (ref 15–37)
BASOPHILS # BLD: 0.2 K/UL (ref 0–0.2)
BASOPHILS NFR BLD: 3 %
BURR CELLS BLD QL SMEAR: ABNORMAL
CREAT SERPL-MCNC: 0.9 MG/DL (ref 0.9–1.3)
CRP SERPL-MCNC: 11.4 MG/L (ref 0–5.1)
DEPRECATED RDW RBC AUTO: 15.8 % (ref 12.4–15.4)
EOSINOPHIL # BLD: 0.2 K/UL (ref 0–0.6)
EOSINOPHIL NFR BLD: 3 %
ERYTHROCYTE [SEDIMENTATION RATE] IN BLOOD BY WESTERGREN METHOD: 21 MM/HR (ref 0–15)
GFR SERPLBLD CREATININE-BSD FMLA CKD-EPI: >60 ML/MIN/{1.73_M2}
HCT VFR BLD AUTO: 33.2 % (ref 40.5–52.5)
HGB BLD-MCNC: 11.1 G/DL (ref 13.5–17.5)
LYMPHOCYTES # BLD: 2.7 K/UL (ref 1–5.1)
LYMPHOCYTES NFR BLD: 51 %
MCH RBC QN AUTO: 31.4 PG (ref 26–34)
MCHC RBC AUTO-ENTMCNC: 33.4 G/DL (ref 31–36)
MCV RBC AUTO: 94.2 FL (ref 80–100)
MONOCYTES # BLD: 0.2 K/UL (ref 0–1.3)
MONOCYTES NFR BLD: 3 %
NEUTROPHILS # BLD: 1.9 K/UL (ref 1.7–7.7)
NEUTROPHILS NFR BLD: 38 %
OVALOCYTES BLD QL SMEAR: ABNORMAL
PLATELET # BLD AUTO: 296 K/UL (ref 135–450)
PLATELET BLD QL SMEAR: ADEQUATE
PMV BLD AUTO: 8.1 FL (ref 5–10.5)
RBC # BLD AUTO: 3.52 M/UL (ref 4.2–5.9)
SLIDE REVIEW: ABNORMAL
VARIANT LYMPHS NFR BLD MANUAL: 2 % (ref 0–6)
WBC # BLD AUTO: 5.1 K/UL (ref 4–11)

## 2023-03-29 PROCEDURE — 4004F PT TOBACCO SCREEN RCVD TLK: CPT | Performed by: INTERNAL MEDICINE

## 2023-03-29 PROCEDURE — G8420 CALC BMI NORM PARAMETERS: HCPCS | Performed by: INTERNAL MEDICINE

## 2023-03-29 PROCEDURE — G8427 DOCREV CUR MEDS BY ELIG CLIN: HCPCS | Performed by: INTERNAL MEDICINE

## 2023-03-29 PROCEDURE — G8484 FLU IMMUNIZE NO ADMIN: HCPCS | Performed by: INTERNAL MEDICINE

## 2023-03-29 PROCEDURE — 99215 OFFICE O/P EST HI 40 MIN: CPT | Performed by: INTERNAL MEDICINE

## 2023-03-29 RX ORDER — PREDNISONE 10 MG/1
TABLET ORAL
Qty: 105 TABLET | Refills: 0 | Status: SHIPPED | OUTPATIENT
Start: 2023-03-29 | End: 2023-05-18

## 2023-03-29 RX ORDER — ADALIMUMAB 40MG/0.4ML
40 KIT SUBCUTANEOUS
Qty: 6 EACH | Refills: 0 | Status: SHIPPED | OUTPATIENT
Start: 2023-03-29 | End: 2023-06-27

## 2023-03-29 RX ORDER — AZATHIOPRINE 50 MG/1
100 TABLET ORAL DAILY
Qty: 90 TABLET | Refills: 0 | Status: SHIPPED | OUTPATIENT
Start: 2023-03-29 | End: 2023-06-27

## 2023-03-29 NOTE — ASSESSMENT & PLAN NOTE
- I d/w the pt that TNF-INHIBITORS can cause increased risk of TB reactivation, oppurtunistic infections, lupus like illness, drug induced lupus, rash, hypersensitivity reaction, infusion reactions, demyelinating disease and possible risk of malignancies and lung diseases. I answered all of the pt's questions to the best of my knowledge and provided the pt a detailed handout on Adalimumab. - previously had indeterminate Quantiferon TB test x 2 in 7/2020. He was evaluated by Deidra Kelly, Dr. Viviana Fregoso and cleared to start TNFi on 8/27/20. Most recent Quantiferon TB test was negative on 12/6/22.  - labs for AZA Q3mo. - the risks, benefits, and alternatives to the use of Prednisone were discussed w/ the pt. Side effects included, but were not limited development of hypertension, hyperglycemia or diabetes mellitus, peptic ulcer disease, cataracts and glaucoma, osteoporosis and risk for fractures, weight gain, cushingoid appearance, avascular necrosis, mood changes including depression and euphoria, the development of a decreased ability to fight off infections. Rare side effects like AVN are seen with long term use of steroids as also, are complications associated with withdrawing from the drug abruptly. - discussed his immunocompromised state on AZA and Adalimumab and indications to hold these medications.

## 2023-03-29 NOTE — PROGRESS NOTES
azaTHIOprine (IMURAN) 50 MG tablet; Take 2 tablets by mouth daily, Disp-90 tablet, R-0Normal  -     Adalimumab (HUMIRA PEN) 40 MG/0.4ML PNKT; Inject 40 mg into the skin every 14 days CITRATE FREE., Disp-6 each, R-0Normal  -     predniSONE (DELTASONE) 10 MG tablet; Take 4 tablets by mouth every morning for 10 days, THEN 3 tablets every morning for 10 days, THEN 2 tablets every morning for 10 days, THEN 1 tablet every morning for 10 days, THEN 0.5 tablets every morning for 10 days. , Disp-105 tablet, R-0Normal  -     C-Reactive Protein; Future  -     Sedimentation Rate; Future  3. Discoid lupus  Assessment & Plan:  - stable, following w/ Dermatology in [place]. - no evidence of SLE to date. - cont  mg daily. - will closely monitor while on TNF-I. Orders:  -     azaTHIOprine (IMURAN) 50 MG tablet; Take 2 tablets by mouth daily, Disp-90 tablet, R-0Normal  4. High risk medication use  Assessment & Plan:  - I d/w the pt that TNF-INHIBITORS can cause increased risk of TB reactivation, oppurtunistic infections, lupus like illness, drug induced lupus, rash, hypersensitivity reaction, infusion reactions, demyelinating disease and possible risk of malignancies and lung diseases. I answered all of the pt's questions to the best of my knowledge and provided the pt a detailed handout on Adalimumab. - previously had indeterminate Quantiferon TB test x 2 in 7/2020. He was evaluated by Dr. Gricel Elizondo and cleared to start TNFi on 8/27/20. Most recent Quantiferon TB test was negative on 12/6/22.  - labs for AZA Q3mo. - the risks, benefits, and alternatives to the use of Prednisone were discussed w/ the pt.  Side effects included, but were not limited development of hypertension, hyperglycemia or diabetes mellitus, peptic ulcer disease, cataracts and glaucoma, osteoporosis and risk for fractures, weight gain, cushingoid appearance, avascular necrosis, mood changes including depression and euphoria, the development of a

## 2023-03-29 NOTE — ASSESSMENT & PLAN NOTE
- followed by GI, Dr. Krys Espana in [place]. - pt stated IBD Sx improved after switching MTX to AZA. Currently well controlled on AZA and  Adalimumab as above #1.

## 2023-03-29 NOTE — ASSESSMENT & PLAN NOTE
- stable, following w/ Dermatology in [place]. - no evidence of SLE to date. - cont  mg daily.   - will closely monitor while on TNF-I.

## 2023-03-30 DIAGNOSIS — D64.9 CHRONIC ANEMIA: Primary | ICD-10-CM

## 2023-05-04 ENCOUNTER — HOSPITAL ENCOUNTER (OUTPATIENT)
Dept: INFUSION THERAPY | Age: 33
Discharge: HOME | End: 2023-05-04
Payer: COMMERCIAL

## 2023-05-04 ENCOUNTER — INITIAL CONSULT (OUTPATIENT)
Dept: ONCOLOGY | Age: 33
End: 2023-05-04
Payer: COMMERCIAL

## 2023-05-04 VITALS
RESPIRATION RATE: 14 BRPM | BODY MASS INDEX: 22.74 KG/M2 | SYSTOLIC BLOOD PRESSURE: 126 MMHG | WEIGHT: 133.2 LBS | DIASTOLIC BLOOD PRESSURE: 87 MMHG | OXYGEN SATURATION: 98 % | TEMPERATURE: 97.9 F | HEIGHT: 64 IN | HEART RATE: 68 BPM

## 2023-05-04 DIAGNOSIS — D64.9 ANEMIA, UNSPECIFIED TYPE: Primary | ICD-10-CM

## 2023-05-04 DIAGNOSIS — D64.9 ANEMIA, UNSPECIFIED TYPE: ICD-10-CM

## 2023-05-04 LAB
ALBUMIN SERPL-MCNC: 3.9 GM/DL (ref 3.4–5)
ALP BLD-CCNC: 59 IU/L (ref 40–129)
ALT SERPL-CCNC: 5 U/L (ref 10–40)
ANION GAP SERPL CALCULATED.3IONS-SCNC: 11 MMOL/L (ref 4–16)
AST SERPL-CCNC: 12 IU/L (ref 15–37)
BASOPHILS ABSOLUTE: 0 K/CU MM
BASOPHILS RELATIVE PERCENT: 0.2 % (ref 0–1)
BILIRUB SERPL-MCNC: 0.1 MG/DL (ref 0–1)
BUN SERPL-MCNC: 14 MG/DL (ref 6–23)
CALCIUM SERPL-MCNC: 8.8 MG/DL (ref 8.3–10.6)
CHLORIDE BLD-SCNC: 106 MMOL/L (ref 99–110)
CO2: 27 MMOL/L (ref 21–32)
CREAT SERPL-MCNC: 1 MG/DL (ref 0.9–1.3)
DIFFERENTIAL TYPE: ABNORMAL
EOSINOPHILS ABSOLUTE: 0.1 K/CU MM
EOSINOPHILS RELATIVE PERCENT: 0.5 % (ref 0–3)
ERYTHROCYTE SEDIMENTATION RATE: 13 MM/HR (ref 0–15)
FERRITIN: 45 NG/ML (ref 30–400)
FOLATE SERPL-MCNC: 7.7 NG/ML (ref 3.1–17.5)
GFR SERPL CREATININE-BSD FRML MDRD: >60 ML/MIN/1.73M2
GLUCOSE SERPL-MCNC: 83 MG/DL (ref 70–99)
HCT VFR BLD CALC: 39.5 % (ref 42–52)
HEMOGLOBIN: 12.8 GM/DL (ref 13.5–18)
IRON: 37 UG/DL (ref 59–158)
LACTATE DEHYDROGENASE: 148 IU/L (ref 120–246)
LYMPHOCYTES ABSOLUTE: 4.4 K/CU MM
LYMPHOCYTES RELATIVE PERCENT: 46 % (ref 24–44)
MCH RBC QN AUTO: 32.1 PG (ref 27–31)
MCHC RBC AUTO-ENTMCNC: 32.4 % (ref 32–36)
MCV RBC AUTO: 99 FL (ref 78–100)
MONOCYTES ABSOLUTE: 0.6 K/CU MM
MONOCYTES RELATIVE PERCENT: 6.2 % (ref 0–4)
PCT TRANSFERRIN: 14 % (ref 10–44)
PDW BLD-RTO: 16.1 % (ref 11.7–14.9)
PLATELET # BLD: 246 K/CU MM (ref 140–440)
PMV BLD AUTO: 9.3 FL (ref 7.5–11.1)
POTASSIUM SERPL-SCNC: 3.9 MMOL/L (ref 3.5–5.1)
RBC # BLD: 3.99 M/CU MM (ref 4.6–6.2)
RETICULOCYTE COUNT PCT: 1.1 % (ref 0.2–2.2)
SEGMENTED NEUTROPHILS ABSOLUTE COUNT: 4.5 K/CU MM
SEGMENTED NEUTROPHILS RELATIVE PERCENT: 47.1 % (ref 36–66)
SODIUM BLD-SCNC: 144 MMOL/L (ref 135–145)
TOTAL IRON BINDING CAPACITY: 258 UG/DL (ref 250–450)
TOTAL PROTEIN: 6.1 GM/DL (ref 6.4–8.2)
TOTAL PROTEIN: 6.1 GM/DL (ref 6.4–8.2)
TSH SERPL DL<=0.005 MIU/L-ACNC: 2.38 UIU/ML (ref 0.27–4.2)
UNSATURATED IRON BINDING CAPACITY: 221 UG/DL (ref 110–370)
VITAMIN B-12: 242.9 PG/ML (ref 211–911)
WBC # BLD: 9.6 K/CU MM (ref 4–10.5)

## 2023-05-04 PROCEDURE — 85025 COMPLETE CBC W/AUTO DIFF WBC: CPT

## 2023-05-04 PROCEDURE — 84165 PROTEIN E-PHORESIS SERUM: CPT

## 2023-05-04 PROCEDURE — 99204 OFFICE O/P NEW MOD 45 MIN: CPT | Performed by: INTERNAL MEDICINE

## 2023-05-04 PROCEDURE — 82728 ASSAY OF FERRITIN: CPT

## 2023-05-04 PROCEDURE — 84443 ASSAY THYROID STIM HORMONE: CPT

## 2023-05-04 PROCEDURE — 83010 ASSAY OF HAPTOGLOBIN QUANT: CPT

## 2023-05-04 PROCEDURE — 85045 AUTOMATED RETICULOCYTE COUNT: CPT

## 2023-05-04 PROCEDURE — 4004F PT TOBACCO SCREEN RCVD TLK: CPT | Performed by: INTERNAL MEDICINE

## 2023-05-04 PROCEDURE — 84155 ASSAY OF PROTEIN SERUM: CPT

## 2023-05-04 PROCEDURE — 82607 VITAMIN B-12: CPT

## 2023-05-04 PROCEDURE — G8427 DOCREV CUR MEDS BY ELIG CLIN: HCPCS | Performed by: INTERNAL MEDICINE

## 2023-05-04 PROCEDURE — 83550 IRON BINDING TEST: CPT

## 2023-05-04 PROCEDURE — 99211 OFF/OP EST MAY X REQ PHY/QHP: CPT

## 2023-05-04 PROCEDURE — 36415 COLL VENOUS BLD VENIPUNCTURE: CPT

## 2023-05-04 PROCEDURE — 80053 COMPREHEN METABOLIC PANEL: CPT

## 2023-05-04 PROCEDURE — 83615 LACTATE (LD) (LDH) ENZYME: CPT

## 2023-05-04 PROCEDURE — 83540 ASSAY OF IRON: CPT

## 2023-05-04 PROCEDURE — 82746 ASSAY OF FOLIC ACID SERUM: CPT

## 2023-05-04 PROCEDURE — 85652 RBC SED RATE AUTOMATED: CPT

## 2023-05-04 PROCEDURE — G8420 CALC BMI NORM PARAMETERS: HCPCS | Performed by: INTERNAL MEDICINE

## 2023-05-04 ASSESSMENT — PATIENT HEALTH QUESTIONNAIRE - PHQ9
SUM OF ALL RESPONSES TO PHQ QUESTIONS 1-9: 6
SUM OF ALL RESPONSES TO PHQ9 QUESTIONS 1 & 2: 6
1. LITTLE INTEREST OR PLEASURE IN DOING THINGS: 3
SUM OF ALL RESPONSES TO PHQ QUESTIONS 1-9: 6
2. FEELING DOWN, DEPRESSED OR HOPELESS: 3

## 2023-05-04 NOTE — PROGRESS NOTES
MA Rooming Questions  Patient: Kerline Hay  MRN: [id number]    Date: 5/4/2023      New patient    5. Did the patient have a depression screening completed today?  Yes    No data recorded     PHQ-9 Given to (if applicable):               PHQ-9 Score (if applicable):                     [] Positive     []  Negative              Does question #9 need addressed (if applicable)                     [] Yes    []  No               Santina Gosselin, MA
facility-administered medications on file prior to visit. Review of Systems:    Constitutional:  No weight loss, No fever, No chills, No night sweats. Energy level fair  Eyes:  No diplopia, No transient or permanent loss of vision, No scotomata. ENT / Mouth:  No epistaxis, No dysphagia, No hoarseness, No oral ulcers, No gingival bleeding. No sore throat, No postnasal drip, No nasal drip, No mouth pain, No sinus pain, No tinnitus, Normal hearing. Cardiovascular:  No chest pain, No palpitations, No syncope, No upper extremity edema, No lower extremity edema, No calf discomfort. Respiratory:  No cough. No hemoptysis, No pleurisy, No wheezing, No dyspnea. Gastrointestinal: As in HPI  Musculoskeletal:  No muscle pain, No swollen joints, No joint redness, No bone pain, No spine tenderness. Skin:  No rash, No nodules, No pruritus, No lesions. Neurologic:  No confusion, No seizures, No syncope, No tremor, No speech change, No headache, No hiccups, No abnormal gait, No sensory changes, No weakness. Psychiatric:  No depression, No anxiety, Concentration normal.  Endocrine:  No polyuria, No polydipsia, No hot flashes, No thyroid symptoms. Hematologic:  No epistaxis, No gingival bleeding, No petechiae, No ecchymosis. Lymphatic:  No lymphadenopathy, No lymphedema. Allergy / Immunologic:  No eczema, No frequent mucous infections, No frequent respiratory infections, No recurrent urticarial, No frequent skin infections.      Vital Signs: /87 (Site: Left Upper Arm, Position: Sitting, Cuff Size: Medium Adult)   Pulse 68   Temp 97.9 °F (36.6 °C) (Infrared)   Resp 14   Ht 5' 4\" (1.626 m)   Wt 133 lb 3.2 oz (60.4 kg)   SpO2 98%   BMI 22.86 kg/m²      CONSTITUTIONAL: awake, alert, little tired appearing and in mild distress  EYES: DEVENDRA, No pallor or any icterus  ENT: ATNC  NECK: No JVD  HEMATOLOGIC/LYMPHATIC: no cervical, supraclavicular or axillary lymphadenopathy   LUNGS: CTAB  CARDIOVASCULAR: s1s2 rrr

## 2023-05-07 LAB — HAPTOGLOB SERPL-MCNC: 241 MG/DL (ref 30–200)

## 2023-05-08 RX ORDER — FERROUS SULFATE 325(65) MG
325 TABLET ORAL EVERY OTHER DAY
Qty: 15 TABLET | Refills: 5 | Status: SHIPPED | OUTPATIENT
Start: 2023-05-08

## 2023-05-08 RX ORDER — LANOLIN ALCOHOL/MO/W.PET/CERES
1000 CREAM (GRAM) TOPICAL DAILY
Qty: 30 TABLET | Refills: 3 | Status: SHIPPED | OUTPATIENT
Start: 2023-05-08

## 2023-05-08 NOTE — PROGRESS NOTES
This nurse called the patient @ [phone] to review lab results. Patient was notified that Dr Nicolas Barron recommends for him to take ferrous sulfate 325 mg every other day and that she would also like for him to receive a B12 injection monthly. The patient is requesting to wait and try oral B12 supplements first. This nurse will advise the doctor of the patient's request. Patient verbalized understanding and denies further needs at this time.

## 2023-05-10 LAB
ALBUMIN SERPL ELPH-MCNC: 3.4 GM/DL (ref 3.2–5.6)
ALPHA-1-GLOBULIN: 0.3 GM/DL (ref 0.1–0.4)
ALPHA-2-GLOBULIN: 0.8 GM/DL (ref 0.4–1.2)
BETA GLOBULIN: 0.9 GM/DL (ref 0.5–1.3)
GAMMA GLOBULIN: 0.7 GM/DL (ref 0.5–1.6)
SPEP INTERPRETATION: ABNORMAL
TOTAL PROTEIN: 6.1 GM/DL (ref 6.4–8.2)

## 2023-05-17 ENCOUNTER — OFFICE (OUTPATIENT)
Dept: URBAN - METROPOLITAN AREA CLINIC 18 | Facility: CLINIC | Age: 33
End: 2023-05-17

## 2023-05-17 VITALS
RESPIRATION RATE: 16 BRPM | WEIGHT: 132 LBS | HEART RATE: 80 BPM | OXYGEN SATURATION: 99 % | SYSTOLIC BLOOD PRESSURE: 117 MMHG | HEIGHT: 65 IN

## 2023-05-17 DIAGNOSIS — K50.10 CROHN'S DISEASE OF LARGE INTESTINE WITHOUT COMPLICATIONS: ICD-10-CM

## 2023-05-17 DIAGNOSIS — D50.9 IRON DEFICIENCY ANEMIA, UNSPECIFIED: ICD-10-CM

## 2023-05-17 DIAGNOSIS — Z72.0 TOBACCO USE: ICD-10-CM

## 2023-05-17 DIAGNOSIS — M45.7 ANKYLOSING SPONDYLITIS OF LUMBOSACRAL REGION: ICD-10-CM

## 2023-05-17 PROCEDURE — 99213 OFFICE O/P EST LOW 20 MIN: CPT | Mod: UD | Performed by: PHYSICIAN ASSISTANT

## 2023-06-29 LAB
C-REACTIVE PROTEIN: 1.86 MG/DL — HIGH
CBC, PLATELET CT  AND  DIFF: ABS BASOPHIL: 0 K/UL
CBC, PLATELET CT  AND  DIFF: ABS EOSINOPHIL: 0.2 K/UL
CBC, PLATELET CT  AND  DIFF: ABS IMMATURE GRANS: 0 K/UL
CBC, PLATELET CT  AND  DIFF: ABS LYMPHOCYTE: 2.5 K/UL
CBC, PLATELET CT  AND  DIFF: ABS MONOCYTE: 0.3 K/UL
CBC, PLATELET CT  AND  DIFF: ABS NEUTROPHIL: 2.7 K/UL
CBC, PLATELET CT  AND  DIFF: BASOPHIL: 0.7 %
CBC, PLATELET CT  AND  DIFF: DIFFERENTIAL: (no result)
CBC, PLATELET CT  AND  DIFF: EOSINOPHIL: 2.7 %
CBC, PLATELET CT  AND  DIFF: HEMATOCRIT: 35.4 % — LOW
CBC, PLATELET CT  AND  DIFF: HEMOGLOBIN: 12.6 G/DL — LOW
CBC, PLATELET CT  AND  DIFF: IMMATURE GRANULOCYTES: 0 %
CBC, PLATELET CT  AND  DIFF: LYMPHOCYTE: 43.7 %
CBC, PLATELET CT  AND  DIFF: MCH: 32.8 PG
CBC, PLATELET CT  AND  DIFF: MCHC: 35.6 G/DL — HIGH
CBC, PLATELET CT  AND  DIFF: MCV: 92.2 FL
CBC, PLATELET CT  AND  DIFF: MONOCYTE: 4.6 %
CBC, PLATELET CT  AND  DIFF: MPV: 10.3 FL
CBC, PLATELET CT  AND  DIFF: NEUTROPHIL: 48.3 %
CBC, PLATELET CT  AND  DIFF: NRBCS: 0 /100 WBC
CBC, PLATELET CT  AND  DIFF: PLATELET COUNT: 276 K/UL
CBC, PLATELET CT  AND  DIFF: RBC: 3.84 M/UL — LOW
CBC, PLATELET CT  AND  DIFF: RDW: 13.5 %
CBC, PLATELET CT  AND  DIFF: WBC COUNT: 5.7 K/UL
COMPREHENSIVE METABOLIC PANEL: A/G RATIO: 2 RATIO
COMPREHENSIVE METABOLIC PANEL: ALBUMIN: 4.3 G/DL
COMPREHENSIVE METABOLIC PANEL: ALK PHOSPHATASE: 69 U/L
COMPREHENSIVE METABOLIC PANEL: ALT: 6 U/L
COMPREHENSIVE METABOLIC PANEL: AST: 18 U/L
COMPREHENSIVE METABOLIC PANEL: BILIRUBIN,TOTAL: 0.2 MG/DL
COMPREHENSIVE METABOLIC PANEL: BLOOD UREA NITROGEN: 8 MG/DL
COMPREHENSIVE METABOLIC PANEL: BUN/CREAT RATIO: 9
COMPREHENSIVE METABOLIC PANEL: CALCIUM: 9.4 MG/DL
COMPREHENSIVE METABOLIC PANEL: CHLORIDE: 106 MEQ/L
COMPREHENSIVE METABOLIC PANEL: CO2: 27 MEQ/L
COMPREHENSIVE METABOLIC PANEL: CREATININE: 0.9 MG/DL
COMPREHENSIVE METABOLIC PANEL: FASTING STATUS: (no result)
COMPREHENSIVE METABOLIC PANEL: GLOBULIN: 2.2 G/DL
COMPREHENSIVE METABOLIC PANEL: GLOMERULAR FILTRATION RATE (GFR): 115 MLS/MIN/1.73M2
COMPREHENSIVE METABOLIC PANEL: GLUCOSE,RANDOM: 134 MG/DL — HIGH
COMPREHENSIVE METABOLIC PANEL: POTASSIUM: 3.9 MEQ/L
COMPREHENSIVE METABOLIC PANEL: SODIUM: 143 MEQ/L
COMPREHENSIVE METABOLIC PANEL: TOTAL PROTEIN: 6.5 G/DL

## 2023-08-07 ENCOUNTER — HOSPITAL ENCOUNTER (OUTPATIENT)
Dept: INFUSION THERAPY | Age: 33
Discharge: HOME | End: 2023-08-07
Payer: COMMERCIAL

## 2023-08-07 DIAGNOSIS — D64.9 ANEMIA, UNSPECIFIED TYPE: ICD-10-CM

## 2023-08-07 LAB
BASOPHILS ABSOLUTE: 0 K/CU MM
BASOPHILS RELATIVE PERCENT: 0.4 % (ref 0–1)
DIFFERENTIAL TYPE: ABNORMAL
EOSINOPHILS ABSOLUTE: 0.1 K/CU MM
EOSINOPHILS RELATIVE PERCENT: 1.8 % (ref 0–3)
HCT VFR BLD CALC: 39.1 % (ref 42–52)
HEMOGLOBIN: 13.2 GM/DL (ref 13.5–18)
LYMPHOCYTES ABSOLUTE: 1.7 K/CU MM
LYMPHOCYTES RELATIVE PERCENT: 34.9 % (ref 24–44)
MCH RBC QN AUTO: 32.2 PG (ref 27–31)
MCHC RBC AUTO-ENTMCNC: 33.8 % (ref 32–36)
MCV RBC AUTO: 95.4 FL (ref 78–100)
MONOCYTES ABSOLUTE: 0.4 K/CU MM
MONOCYTES RELATIVE PERCENT: 7.8 % (ref 0–4)
PDW BLD-RTO: 13 % (ref 11.7–14.9)
PLATELET # BLD: 251 K/CU MM (ref 140–440)
PMV BLD AUTO: 9.2 FL (ref 7.5–11.1)
RBC # BLD: 4.1 M/CU MM (ref 4.6–6.2)
SEGMENTED NEUTROPHILS ABSOLUTE COUNT: 2.7 K/CU MM
SEGMENTED NEUTROPHILS RELATIVE PERCENT: 55.1 % (ref 36–66)
WBC # BLD: 5 K/CU MM (ref 4–10.5)

## 2023-08-07 PROCEDURE — 82728 ASSAY OF FERRITIN: CPT

## 2023-08-07 PROCEDURE — 82746 ASSAY OF FOLIC ACID SERUM: CPT

## 2023-08-07 PROCEDURE — 36415 COLL VENOUS BLD VENIPUNCTURE: CPT

## 2023-08-07 PROCEDURE — 83550 IRON BINDING TEST: CPT

## 2023-08-07 PROCEDURE — 85025 COMPLETE CBC W/AUTO DIFF WBC: CPT

## 2023-08-07 PROCEDURE — 82607 VITAMIN B-12: CPT

## 2023-08-07 PROCEDURE — 80053 COMPREHEN METABOLIC PANEL: CPT

## 2023-08-07 PROCEDURE — 83540 ASSAY OF IRON: CPT

## 2023-08-08 LAB
ALBUMIN SERPL-MCNC: 4.4 GM/DL (ref 3.4–5)
ALP BLD-CCNC: 73 IU/L (ref 40–129)
ALT SERPL-CCNC: <5 U/L (ref 10–40)
ANION GAP SERPL CALCULATED.3IONS-SCNC: 7 MMOL/L (ref 4–16)
AST SERPL-CCNC: 14 IU/L (ref 15–37)
BILIRUB SERPL-MCNC: 0.2 MG/DL (ref 0–1)
BUN SERPL-MCNC: 8 MG/DL (ref 6–23)
CALCIUM SERPL-MCNC: 9.4 MG/DL (ref 8.3–10.6)
CHLORIDE BLD-SCNC: 107 MMOL/L (ref 99–110)
CO2: 28 MMOL/L (ref 21–32)
CREAT SERPL-MCNC: 1 MG/DL (ref 0.9–1.3)
FERRITIN: 63 NG/ML (ref 30–400)
FOLATE SERPL-MCNC: 9.5 NG/ML (ref 3.1–17.5)
GFR SERPL CREATININE-BSD FRML MDRD: >60 ML/MIN/1.73M2
GLUCOSE SERPL-MCNC: 84 MG/DL (ref 70–99)
IRON: 62 UG/DL (ref 59–158)
PCT TRANSFERRIN: 27 % (ref 10–44)
POTASSIUM SERPL-SCNC: 5.1 MMOL/L (ref 3.5–5.1)
SODIUM BLD-SCNC: 142 MMOL/L (ref 135–145)
TOTAL IRON BINDING CAPACITY: 226 UG/DL (ref 250–450)
TOTAL PROTEIN: 6.6 GM/DL (ref 6.4–8.2)
UNSATURATED IRON BINDING CAPACITY: 164 UG/DL (ref 110–370)
VITAMIN B-12: 484.9 PG/ML (ref 211–911)

## 2023-08-17 ENCOUNTER — OFFICE (OUTPATIENT)
Dept: URBAN - METROPOLITAN AREA CLINIC 18 | Facility: CLINIC | Age: 33
End: 2023-08-17

## 2023-08-17 VITALS
WEIGHT: 133 LBS | HEART RATE: 88 BPM | HEIGHT: 65 IN | DIASTOLIC BLOOD PRESSURE: 84 MMHG | SYSTOLIC BLOOD PRESSURE: 136 MMHG

## 2023-08-17 DIAGNOSIS — M45.7 ANKYLOSING SPONDYLITIS OF LUMBOSACRAL REGION: ICD-10-CM

## 2023-08-17 DIAGNOSIS — D50.9 IRON DEFICIENCY ANEMIA, UNSPECIFIED: ICD-10-CM

## 2023-08-17 DIAGNOSIS — K50.10 CROHN'S DISEASE OF LARGE INTESTINE WITHOUT COMPLICATIONS: ICD-10-CM

## 2023-08-17 LAB
CALPROTECTIN, STOOL: 778 MCG/G — HIGH
REPORT COMMENT: (no result)

## 2023-08-17 PROCEDURE — 99214 OFFICE O/P EST MOD 30 MIN: CPT | Performed by: INTERNAL MEDICINE

## 2023-10-02 ENCOUNTER — HOSPITAL ENCOUNTER (OUTPATIENT)
Dept: INFUSION THERAPY | Age: 33
Discharge: HOME | End: 2023-10-02
Payer: COMMERCIAL

## 2023-10-02 ENCOUNTER — OFFICE VISIT (OUTPATIENT)
Dept: ONCOLOGY | Age: 33
End: 2023-10-02
Payer: COMMERCIAL

## 2023-10-02 VITALS
WEIGHT: 133 LBS | BODY MASS INDEX: 22.71 KG/M2 | HEIGHT: 64 IN | SYSTOLIC BLOOD PRESSURE: 116 MMHG | HEART RATE: 63 BPM | OXYGEN SATURATION: 99 % | DIASTOLIC BLOOD PRESSURE: 81 MMHG

## 2023-10-02 DIAGNOSIS — L93.0 DISCOID LUPUS: Chronic | ICD-10-CM

## 2023-10-02 DIAGNOSIS — M45.0 ANKYLOSING SPONDYLITIS OF MULTIPLE SITES IN SPINE (HCC): Chronic | ICD-10-CM

## 2023-10-02 DIAGNOSIS — D64.9 ANEMIA, UNSPECIFIED TYPE: Primary | ICD-10-CM

## 2023-10-02 DIAGNOSIS — K50.919 CROHN'S DISEASE WITH COMPLICATION, UNSPECIFIED GASTROINTESTINAL TRACT LOCATION (HCC): Chronic | ICD-10-CM

## 2023-10-02 PROCEDURE — G8484 FLU IMMUNIZE NO ADMIN: HCPCS | Performed by: INTERNAL MEDICINE

## 2023-10-02 PROCEDURE — G8427 DOCREV CUR MEDS BY ELIG CLIN: HCPCS | Performed by: INTERNAL MEDICINE

## 2023-10-02 PROCEDURE — 4004F PT TOBACCO SCREEN RCVD TLK: CPT | Performed by: INTERNAL MEDICINE

## 2023-10-02 PROCEDURE — 99211 OFF/OP EST MAY X REQ PHY/QHP: CPT

## 2023-10-02 PROCEDURE — G8420 CALC BMI NORM PARAMETERS: HCPCS | Performed by: INTERNAL MEDICINE

## 2023-10-02 PROCEDURE — 99213 OFFICE O/P EST LOW 20 MIN: CPT | Performed by: INTERNAL MEDICINE

## 2023-10-02 NOTE — PROGRESS NOTES
Patient Name:  Santa Esquivel  Patient :  1990  Patient MRN:  [id number]     Primary Oncologist: Nuria Deutsch MD  Referring Provider: Hernandez Snyder MD     Date of Service: 10/2/2023      Reason for Consult:  Chronic Anemia      Chief Complaint:    Chief Complaint   Patient presents with    Follow-up       Encounter Diagnoses   Name Primary? Anemia, unspecified type Yes    Crohn's disease with complication, unspecified gastrointestinal tract location [facility])     Discoid lupus     Ankylosing spondylitis of multiple sites in spine [facility])         HPI: 23: He arrived alone to the clinic today. Reported that he has been using Humira for Crohn's disease. Just completed a course of steroids as well. Also reported chronic back pain. No bleeding. No fever. Weight has been stable lately. Currently denies any chest pain, abdominal pain. Urinary symptoms. 20 CT Chest  IMPRESSION:  1. No acute abnormality. 2021 CBC WBC 5.5 hemoglobin 12.8 hematocrit 38.2 MCV 90.8 platelets 771 ANC 4349  ALT 8  AST 13  Creatinine 1.1  CRP 57.8  Vitamin D 25: 13.1    10/28/2021 anti-DNA antibody less than 1  C4 25.3  C3 131.8  Sed rate 29  CRP 36.2  CMP WNL  CBC WBC 3.9 hemoglobin 12.2 hematocrit 37.4 MCV 90.6 platelets 609 ANC 4160  Thiopurine Methyltransferase 26.7  Urinalysis Cloudy, otherwise WNL    10/29/21 MRI Pelvis  IMPRESSION:     1. BILATERAL SACROILIITIS WHICH APPEARS CHRONIC. A SERONEGATIVE   SPONDYLOARTHROPATHY SUCH AS ANKYLOSING SPONDYLITIS SHOULD BE CONSIDERED. MRI Lumbar Spine  IMPRESSION:     1. There is mild edema along the anterior superior endplates of L4 and   L5 vertebra. This finding could represent early osteitis/Romanus lesions   with ankylosing spondylitis. 2.  At L4-L5 there is a disc herniation that causes mild narrowing of the   spinal canal.  Moderate bilateral neural foraminal narrowing is also seen   at this level.    3.  At T12-L1 there is moderate bilateral

## 2023-10-02 NOTE — PROGRESS NOTES
MA Rooming Questions  Patient: Michela Brownlee  MRN: [id number]    Date: 10/2/2023        1. Do you have any new issues?   no         2. Do you need any refills on medications?    no    3. Have you had any imaging done since your last visit? yes - MRI through another doctor     4. Have you been hospitalized or seen in the emergency room since your last visit here?   no    5. Did the patient have a depression screening completed today?  No    No data recorded     PHQ-9 Given to (if applicable):               PHQ-9 Score (if applicable):                     [] Positive     []  Negative              Does question #9 need addressed (if applicable)                     [] Yes    []  No               Stalin Banks MA

## 2023-11-16 RX ORDER — LANOLIN ALCOHOL/MO/W.PET/CERES
1000 CREAM (GRAM) TOPICAL DAILY
Qty: 30 TABLET | Refills: 3 | Status: SHIPPED | OUTPATIENT
Start: 2023-11-16

## 2024-01-05 ENCOUNTER — OFFICE VISIT (OUTPATIENT)
Dept: ONCOLOGY | Age: 34
End: 2024-01-05
Payer: COMMERCIAL

## 2024-01-05 ENCOUNTER — HOSPITAL ENCOUNTER (OUTPATIENT)
Dept: INFUSION THERAPY | Age: 34
Discharge: HOME | End: 2024-01-05
Payer: COMMERCIAL

## 2024-01-05 VITALS
HEIGHT: 64 IN | SYSTOLIC BLOOD PRESSURE: 108 MMHG | TEMPERATURE: 97.5 F | HEART RATE: 79 BPM | WEIGHT: 128.4 LBS | DIASTOLIC BLOOD PRESSURE: 74 MMHG | BODY MASS INDEX: 21.92 KG/M2 | OXYGEN SATURATION: 99 %

## 2024-01-05 DIAGNOSIS — D64.9 ANEMIA, UNSPECIFIED TYPE: ICD-10-CM

## 2024-01-05 DIAGNOSIS — D64.9 ANEMIA, UNSPECIFIED TYPE: Primary | ICD-10-CM

## 2024-01-05 LAB
ALBUMIN SERPL-MCNC: 4.2 GM/DL (ref 3.4–5)
ALP BLD-CCNC: 65 IU/L (ref 40–129)
ALT SERPL-CCNC: <5 U/L (ref 10–40)
ANION GAP SERPL CALCULATED.3IONS-SCNC: 12 MMOL/L (ref 7–16)
AST SERPL-CCNC: 12 IU/L (ref 15–37)
BASOPHILS ABSOLUTE: 0 K/CU MM
BASOPHILS RELATIVE PERCENT: 0.2 % (ref 0–1)
BILIRUB SERPL-MCNC: 0.5 MG/DL (ref 0–1)
BUN SERPL-MCNC: 9 MG/DL (ref 6–23)
CALCIUM SERPL-MCNC: 8.9 MG/DL (ref 8.3–10.6)
CHLORIDE BLD-SCNC: 105 MMOL/L (ref 99–110)
CO2: 24 MMOL/L (ref 21–32)
CREAT SERPL-MCNC: 0.9 MG/DL (ref 0.9–1.3)
DIFFERENTIAL TYPE: ABNORMAL
EOSINOPHILS ABSOLUTE: 0.1 K/CU MM
EOSINOPHILS RELATIVE PERCENT: 1.8 % (ref 0–3)
FERRITIN: 166 NG/ML (ref 30–400)
FOLATE SERPL-MCNC: 5.4 NG/ML (ref 3.1–17.5)
GFR SERPL CREATININE-BSD FRML MDRD: >60 ML/MIN/1.73M2
GLUCOSE SERPL-MCNC: 76 MG/DL (ref 70–99)
HCT VFR BLD CALC: 33.5 % (ref 42–52)
HEMOGLOBIN: 11.1 GM/DL (ref 13.5–18)
IRON: 124 UG/DL (ref 59–158)
LYMPHOCYTES ABSOLUTE: 2 K/CU MM
LYMPHOCYTES RELATIVE PERCENT: 36.8 % (ref 24–44)
MCH RBC QN AUTO: 32 PG (ref 27–31)
MCHC RBC AUTO-ENTMCNC: 33.1 % (ref 32–36)
MCV RBC AUTO: 96.5 FL (ref 78–100)
MONOCYTES ABSOLUTE: 0.4 K/CU MM
MONOCYTES RELATIVE PERCENT: 7.2 % (ref 0–4)
PCT TRANSFERRIN: 64 % (ref 10–44)
PDW BLD-RTO: 15 % (ref 11.7–14.9)
PLATELET # BLD: 432 K/CU MM (ref 140–440)
PMV BLD AUTO: 8.5 FL (ref 7.5–11.1)
POTASSIUM SERPL-SCNC: 4 MMOL/L (ref 3.5–5.1)
RBC # BLD: 3.47 M/CU MM (ref 4.6–6.2)
SEGMENTED NEUTROPHILS ABSOLUTE COUNT: 3 K/CU MM
SEGMENTED NEUTROPHILS RELATIVE PERCENT: 54 % (ref 36–66)
SODIUM BLD-SCNC: 141 MMOL/L (ref 135–145)
TOTAL IRON BINDING CAPACITY: 194 UG/DL (ref 250–450)
TOTAL PROTEIN: 6.9 GM/DL (ref 6.4–8.2)
TSH SERPL DL<=0.005 MIU/L-ACNC: 1.52 UIU/ML (ref 0.27–4.2)
UNSATURATED IRON BINDING CAPACITY: 70 UG/DL (ref 110–370)
VITAMIN B-12: 522.2 PG/ML (ref 211–911)
WBC # BLD: 5.5 K/CU MM (ref 4–10.5)

## 2024-01-05 PROCEDURE — 4004F PT TOBACCO SCREEN RCVD TLK: CPT | Performed by: INTERNAL MEDICINE

## 2024-01-05 PROCEDURE — 80053 COMPREHEN METABOLIC PANEL: CPT

## 2024-01-05 PROCEDURE — 84443 ASSAY THYROID STIM HORMONE: CPT

## 2024-01-05 PROCEDURE — 99213 OFFICE O/P EST LOW 20 MIN: CPT | Performed by: INTERNAL MEDICINE

## 2024-01-05 PROCEDURE — G8484 FLU IMMUNIZE NO ADMIN: HCPCS | Performed by: INTERNAL MEDICINE

## 2024-01-05 PROCEDURE — 82746 ASSAY OF FOLIC ACID SERUM: CPT

## 2024-01-05 PROCEDURE — G8427 DOCREV CUR MEDS BY ELIG CLIN: HCPCS | Performed by: INTERNAL MEDICINE

## 2024-01-05 PROCEDURE — 83550 IRON BINDING TEST: CPT

## 2024-01-05 PROCEDURE — 82728 ASSAY OF FERRITIN: CPT

## 2024-01-05 PROCEDURE — 85025 COMPLETE CBC W/AUTO DIFF WBC: CPT

## 2024-01-05 PROCEDURE — 83540 ASSAY OF IRON: CPT

## 2024-01-05 PROCEDURE — G8420 CALC BMI NORM PARAMETERS: HCPCS | Performed by: INTERNAL MEDICINE

## 2024-01-05 PROCEDURE — 99211 OFF/OP EST MAY X REQ PHY/QHP: CPT

## 2024-01-05 PROCEDURE — 82607 VITAMIN B-12: CPT

## 2024-01-05 PROCEDURE — 36415 COLL VENOUS BLD VENIPUNCTURE: CPT

## 2024-01-05 RX ORDER — FERROUS SULFATE 325(65) MG
325 TABLET ORAL EVERY OTHER DAY
Qty: 15 TABLET | Refills: 5 | Status: SHIPPED | OUTPATIENT
Start: 2024-01-05

## 2024-01-05 RX ORDER — LANOLIN ALCOHOL/MO/W.PET/CERES
1000 CREAM (GRAM) TOPICAL DAILY
Qty: 30 TABLET | Refills: 3 | Status: SHIPPED | OUTPATIENT
Start: 2024-01-05

## 2024-01-05 NOTE — PROGRESS NOTES
Patient Name:  Blaise Mcfadden  Patient :  1990  Patient MRN:  [id number]     Primary Oncologist: Brittani Zabala MD  Referring Provider: Devin Hansen MD     Date of Service: 2024      Reason for Consult:  Chronic Anemia      Chief Complaint:    Chief Complaint   Patient presents with    Follow-up       Encounter Diagnosis   Name Primary?    Anemia, unspecified type Yes          HPI: 23: He arrived alone to the clinic today.  Reported that he has been using Humira for Crohn's disease.  Just completed a course of steroids as well.  Also reported chronic back pain.  No bleeding.  No fever.  Weight has been stable lately.  Currently denies any chest pain, abdominal pain.  Urinary symptoms.    20 CT Chest  IMPRESSION:  1. No acute abnormality.    2021 CBC WBC 5.5 hemoglobin 12.8 hematocrit 38.2 MCV 90.8 platelets 231 ANC 2900  ALT 8  AST 13  Creatinine 1.1  CRP 57.8  Vitamin D 25: 13.1    10/28/2021 anti-DNA antibody less than 1  C4 25.3  C3 131.8  Sed rate 29  CRP 36.2  CMP WNL  CBC WBC 3.9 hemoglobin 12.2 hematocrit 37.4 MCV 90.6 platelets 244 ANC 1400  Thiopurine Methyltransferase 26.7  Urinalysis Cloudy, otherwise WNL    10/29/21 MRI Pelvis  IMPRESSION:     1.  BILATERAL SACROILIITIS WHICH APPEARS CHRONIC.  A SERONEGATIVE   SPONDYLOARTHROPATHY SUCH AS ANKYLOSING SPONDYLITIS SHOULD BE CONSIDERED.   MRI Lumbar Spine  IMPRESSION:     1.  There is mild edema along the anterior superior endplates of L4 and   L5 vertebra.  This finding could represent early osteitis/Romanus lesions   with ankylosing spondylitis.   2.  At L4-L5 there is a disc herniation that causes mild narrowing of the   spinal canal.  Moderate bilateral neural foraminal narrowing is also seen   at this level.   3.  At T12-L1 there is moderate bilateral neural foraminal stenosis due   to facet joint arthropathy.   4.  Pedicular edema at T12, L3, and L4 may relate to stress reaction or   inflammatory change.   5.  Small

## 2024-01-05 NOTE — PROGRESS NOTES
MA Rooming Questions  Patient: Blaise Mcfadden  MRN: [id number]    Date: 1/5/2024        1. Do you have any new issues?   no         2. Do you need any refills on medications?    yes - B-12 & Iron     3. Have you had any imaging done since your last visit?   no    4. Have you been hospitalized or seen in the emergency room since your last visit here?   no    5. Did the patient have a depression screening completed today? No    No data recorded     PHQ-9 Given to (if applicable):               PHQ-9 Score (if applicable):                     [] Positive     []  Negative              Does question #9 need addressed (if applicable)                     [] Yes    []  No               Katharina Waller MA

## 2024-01-09 ENCOUNTER — CLINICAL DOCUMENTATION (OUTPATIENT)
Dept: ONCOLOGY | Age: 34
End: 2024-01-09

## 2024-01-09 NOTE — PROGRESS NOTES
Lab results from 01/05/2024 reviewed. Physician recommending to stop taking iron and B12 supplements. Called patient @ [phone] to update. Voices understanding. No further needs addressed at this time.

## 2024-01-16 ENCOUNTER — OFFICE (OUTPATIENT)
Dept: URBAN - METROPOLITAN AREA CLINIC 18 | Facility: CLINIC | Age: 34
End: 2024-01-16

## 2024-01-16 VITALS
SYSTOLIC BLOOD PRESSURE: 126 MMHG | DIASTOLIC BLOOD PRESSURE: 88 MMHG | HEIGHT: 65 IN | WEIGHT: 131 LBS | HEART RATE: 74 BPM

## 2024-01-16 DIAGNOSIS — M45.7 ANKYLOSING SPONDYLITIS OF LUMBOSACRAL REGION: ICD-10-CM

## 2024-01-16 DIAGNOSIS — D50.9 IRON DEFICIENCY ANEMIA, UNSPECIFIED: ICD-10-CM

## 2024-01-16 DIAGNOSIS — K50.10 CROHN'S DISEASE OF LARGE INTESTINE WITHOUT COMPLICATIONS: ICD-10-CM

## 2024-01-16 PROCEDURE — 99214 OFFICE O/P EST MOD 30 MIN: CPT | Performed by: INTERNAL MEDICINE

## 2024-04-03 ENCOUNTER — OFFICE VISIT (OUTPATIENT)
Dept: ONCOLOGY | Age: 34
End: 2024-04-03
Payer: COMMERCIAL

## 2024-04-03 ENCOUNTER — HOSPITAL ENCOUNTER (OUTPATIENT)
Dept: INFUSION THERAPY | Age: 34
Discharge: HOME | End: 2024-04-03
Payer: MEDICARE

## 2024-04-03 VITALS
TEMPERATURE: 97.8 F | HEIGHT: 64 IN | OXYGEN SATURATION: 96 % | RESPIRATION RATE: 18 BRPM | SYSTOLIC BLOOD PRESSURE: 124 MMHG | WEIGHT: 132.6 LBS | HEART RATE: 62 BPM | BODY MASS INDEX: 22.64 KG/M2 | DIASTOLIC BLOOD PRESSURE: 88 MMHG

## 2024-04-03 DIAGNOSIS — D64.9 ANEMIA, UNSPECIFIED TYPE: Primary | ICD-10-CM

## 2024-04-03 DIAGNOSIS — D64.9 ANEMIA, UNSPECIFIED TYPE: ICD-10-CM

## 2024-04-03 DIAGNOSIS — M45.0 ANKYLOSING SPONDYLITIS OF MULTIPLE SITES IN SPINE (HCC): ICD-10-CM

## 2024-04-03 DIAGNOSIS — L93.0 DISCOID LUPUS: ICD-10-CM

## 2024-04-03 DIAGNOSIS — K50.919 CROHN'S DISEASE WITH COMPLICATION, UNSPECIFIED GASTROINTESTINAL TRACT LOCATION (HCC): ICD-10-CM

## 2024-04-03 LAB
ALBUMIN SERPL-MCNC: 4.2 GM/DL (ref 3.4–5)
ALP BLD-CCNC: 74 IU/L (ref 40–129)
ALT SERPL-CCNC: <5 U/L (ref 10–40)
ANION GAP SERPL CALCULATED.3IONS-SCNC: 10 MMOL/L (ref 7–16)
AST SERPL-CCNC: 13 IU/L (ref 15–37)
BASOPHILS ABSOLUTE: 0 K/CU MM
BASOPHILS RELATIVE PERCENT: 0.5 % (ref 0–1)
BILIRUB SERPL-MCNC: 0.2 MG/DL (ref 0–1)
BUN SERPL-MCNC: 9 MG/DL (ref 6–23)
CALCIUM SERPL-MCNC: 9 MG/DL (ref 8.3–10.6)
CHLORIDE BLD-SCNC: 109 MMOL/L (ref 99–110)
CO2: 22 MMOL/L (ref 21–32)
CREAT SERPL-MCNC: 0.8 MG/DL (ref 0.9–1.3)
DIFFERENTIAL TYPE: ABNORMAL
EOSINOPHILS ABSOLUTE: 0.1 K/CU MM
EOSINOPHILS RELATIVE PERCENT: 2.1 % (ref 0–3)
ERYTHROCYTE SEDIMENTATION RATE: 52 MM/HR (ref 0–15)
FERRITIN: 92 NG/ML (ref 30–400)
FOLATE SERPL-MCNC: 5 NG/ML (ref 3.1–17.5)
GFR SERPL CREATININE-BSD FRML MDRD: >90 ML/MIN/1.73M2
GLUCOSE SERPL-MCNC: 82 MG/DL (ref 70–99)
HCT VFR BLD CALC: 37.4 % (ref 42–52)
HEMOGLOBIN: 11.8 GM/DL (ref 13.5–18)
IMMATURE NEUTROPHIL %: 0.2 % (ref 0–0.43)
IRON: 85 UG/DL (ref 59–158)
LYMPHOCYTES ABSOLUTE: 1.3 K/CU MM
LYMPHOCYTES RELATIVE PERCENT: 30.6 % (ref 24–44)
MCH RBC QN AUTO: 30.3 PG (ref 27–31)
MCHC RBC AUTO-ENTMCNC: 31.6 % (ref 32–36)
MCV RBC AUTO: 95.9 FL (ref 78–100)
MONOCYTES ABSOLUTE: 0.2 K/CU MM
MONOCYTES RELATIVE PERCENT: 3.7 % (ref 0–4)
NUCLEATED RBC %: 0 %
PCT TRANSFERRIN: 36 % (ref 10–44)
PDW BLD-RTO: 12.4 % (ref 11.7–14.9)
PLATELET # BLD: 313 K/CU MM (ref 140–440)
PMV BLD AUTO: 9.6 FL (ref 7.5–11.1)
POTASSIUM SERPL-SCNC: 4.1 MMOL/L (ref 3.5–5.1)
RBC # BLD: 3.9 M/CU MM (ref 4.6–6.2)
RETICULOCYTE COUNT PCT: 0.8 % (ref 0.2–2.2)
SEGMENTED NEUTROPHILS ABSOLUTE COUNT: 2.8 K/CU MM
SEGMENTED NEUTROPHILS RELATIVE PERCENT: 62.9 % (ref 36–66)
SODIUM BLD-SCNC: 141 MMOL/L (ref 135–145)
TOTAL IMMATURE NEUTOROPHIL: 0.01 K/CU MM
TOTAL IRON BINDING CAPACITY: 237 UG/DL (ref 250–450)
TOTAL NUCLEATED RBC: 0 K/CU MM
TOTAL PROTEIN: 6.9 GM/DL (ref 6.4–8.2)
UNSATURATED IRON BINDING CAPACITY: 152 UG/DL (ref 110–370)
VITAMIN B-12: 426.3 PG/ML (ref 211–911)
WBC # BLD: 4.4 K/CU MM (ref 4–10.5)

## 2024-04-03 PROCEDURE — 82746 ASSAY OF FOLIC ACID SERUM: CPT

## 2024-04-03 PROCEDURE — 85652 RBC SED RATE AUTOMATED: CPT

## 2024-04-03 PROCEDURE — 36415 COLL VENOUS BLD VENIPUNCTURE: CPT

## 2024-04-03 PROCEDURE — 82607 VITAMIN B-12: CPT

## 2024-04-03 PROCEDURE — 99213 OFFICE O/P EST LOW 20 MIN: CPT | Performed by: INTERNAL MEDICINE

## 2024-04-03 PROCEDURE — G8427 DOCREV CUR MEDS BY ELIG CLIN: HCPCS | Performed by: INTERNAL MEDICINE

## 2024-04-03 PROCEDURE — 80053 COMPREHEN METABOLIC PANEL: CPT

## 2024-04-03 PROCEDURE — 83540 ASSAY OF IRON: CPT

## 2024-04-03 PROCEDURE — 85025 COMPLETE CBC W/AUTO DIFF WBC: CPT

## 2024-04-03 PROCEDURE — 83550 IRON BINDING TEST: CPT

## 2024-04-03 PROCEDURE — 99211 OFF/OP EST MAY X REQ PHY/QHP: CPT

## 2024-04-03 PROCEDURE — 82728 ASSAY OF FERRITIN: CPT

## 2024-04-03 PROCEDURE — G8420 CALC BMI NORM PARAMETERS: HCPCS | Performed by: INTERNAL MEDICINE

## 2024-04-03 PROCEDURE — 4004F PT TOBACCO SCREEN RCVD TLK: CPT | Performed by: INTERNAL MEDICINE

## 2024-04-03 PROCEDURE — 85045 AUTOMATED RETICULOCYTE COUNT: CPT

## 2024-04-03 NOTE — PROGRESS NOTES
MA Rooming Questions  Patient: Blaise Mcfadden  MRN: [id number]    Date: 4/3/2024        1. Do you have any new issues?   no         2. Do you need any refills on medications?    no    3. Have you had any imaging done since your last visit?   no    4. Have you been hospitalized or seen in the emergency room since your last visit here?   no    5. Did the patient have a depression screening completed today? No    No data recorded     PHQ-9 Given to (if applicable):               PHQ-9 Score (if applicable):                     [] Positive     []  Negative              Does question #9 need addressed (if applicable)                     [] Yes    []  No               Sunshine Mera CMA    
tablet       STELARA 90 MG/ML SOSY prefilled syringe       fluocinolone 0.01 % cream Apply topically 2 times daily      hydrocortisone 0.5 % cream Apply topically 2 times daily Apply topically 2 times daily.      amLODIPine (NORVASC) 10 MG tablet Take 1 tablet by mouth daily      azaTHIOprine (IMURAN) 50 MG tablet Take 2 tablets by mouth daily 90 tablet 0     No current facility-administered medications on file prior to visit.   Interval history,4/3/24 he arrived with his mother to the clinic today. Gained weight. No crohn's flare. No chest pain, increased sob. No abdominal pain. No fever. Energy levels are good. Has been active.     Review of Systems:  As per interval history     Vital Signs: /88 (Site: Right Upper Arm, Position: Sitting, Cuff Size: Medium Adult)   Pulse 62   Temp 97.8 °F (36.6 °C) (Temporal)   Resp 18   Ht 1.626 m (5' 4.02\")   Wt 60.1 kg (132 lb 9.6 oz)   SpO2 96%   BMI 22.75 kg/m²      CONSTITUTIONAL: awake, alert, appears sleepy  EYES: DEVENDRA, No pallor or any icterus  ENT: ATNC  NECK: No JVD  HEMATOLOGIC/LYMPHATIC: no cervical, supraclavicular or axillary lymphadenopathy   LUNGS: CTAB  CARDIOVASCULAR: s1s2 rrr no murmurs  ABDOMEN: soft ntnd bs pos  NEUROLOGIC: GI  SKIN: No rash  EXTREMITIES: no LE edema bilaterally     Labs:  Hematology:  Lab Results   Component Value Date    WBC 5.5 01/05/2024    RBC 3.47 (L) 01/05/2024    HGB 11.1 (L) 01/05/2024    HCT 33.5 (L) 01/05/2024    MCV 96.5 01/05/2024    MCH 32.0 (H) 01/05/2024    MCHC 33.1 01/05/2024    RDW 15.0 (H) 01/05/2024     01/05/2024    MPV 8.5 01/05/2024    SEGSPCT 54.0 01/05/2024    EOSRELPCT 1.8 01/05/2024    BASOPCT 0.2 01/05/2024    LYMPHOPCT 36.8 01/05/2024    MONOPCT 7.2 (H) 01/05/2024    SEGSABS 3.0 01/05/2024    EOSABS 0.1 01/05/2024    BASOSABS 0.0 01/05/2024    LYMPHSABS 2.0 01/05/2024    MONOSABS 0.4 01/05/2024    DIFFTYPE AUTOMATED DIFFERENTIAL 01/05/2024    ANISOCYTOSIS 1+ (A) 03/29/2023     Lab Results

## 2024-07-16 ENCOUNTER — HOSPITAL ENCOUNTER (OUTPATIENT)
Dept: INFUSION THERAPY | Age: 34
Discharge: HOME | End: 2024-07-16
Payer: MEDICARE

## 2024-07-16 ENCOUNTER — OFFICE (OUTPATIENT)
Dept: URBAN - METROPOLITAN AREA CLINIC 18 | Facility: CLINIC | Age: 34
End: 2024-07-16

## 2024-07-16 VITALS
DIASTOLIC BLOOD PRESSURE: 84 MMHG | HEART RATE: 72 BPM | HEIGHT: 65 IN | SYSTOLIC BLOOD PRESSURE: 128 MMHG | WEIGHT: 133 LBS

## 2024-07-16 DIAGNOSIS — K50.111 CROHN'S DISEASE OF LARGE INTESTINE WITH RECTAL BLEEDING: ICD-10-CM

## 2024-07-16 DIAGNOSIS — L93.0 DISCOID LUPUS: ICD-10-CM

## 2024-07-16 DIAGNOSIS — D50.9 IRON DEFICIENCY ANEMIA, UNSPECIFIED: ICD-10-CM

## 2024-07-16 DIAGNOSIS — K50.919 CROHN'S DISEASE WITH COMPLICATION, UNSPECIFIED GASTROINTESTINAL TRACT LOCATION (HCC): ICD-10-CM

## 2024-07-16 DIAGNOSIS — M45.7 ANKYLOSING SPONDYLITIS OF LUMBOSACRAL REGION: ICD-10-CM

## 2024-07-16 DIAGNOSIS — D64.9 ANEMIA, UNSPECIFIED TYPE: ICD-10-CM

## 2024-07-16 DIAGNOSIS — M45.0 ANKYLOSING SPONDYLITIS OF MULTIPLE SITES IN SPINE (HCC): ICD-10-CM

## 2024-07-16 LAB
ALBUMIN SERPL-MCNC: 4.2 GM/DL (ref 3.4–5)
ALP BLD-CCNC: 69 IU/L (ref 40–129)
ALT SERPL-CCNC: <5 U/L (ref 10–40)
ANION GAP SERPL CALCULATED.3IONS-SCNC: 10 MMOL/L (ref 7–16)
AST SERPL-CCNC: 16 IU/L (ref 15–37)
BASOPHILS ABSOLUTE: 0 K/CU MM
BASOPHILS RELATIVE PERCENT: 0.2 % (ref 0–1)
BILIRUB SERPL-MCNC: 0.2 MG/DL (ref 0–1)
BUN SERPL-MCNC: 10 MG/DL (ref 6–23)
CALCIUM SERPL-MCNC: 9.2 MG/DL (ref 8.3–10.6)
CHLORIDE BLD-SCNC: 103 MMOL/L (ref 99–110)
CO2: 27 MMOL/L (ref 21–32)
CREAT SERPL-MCNC: 0.8 MG/DL (ref 0.9–1.3)
DIFFERENTIAL TYPE: ABNORMAL
EOSINOPHILS ABSOLUTE: 0.1 K/CU MM
EOSINOPHILS RELATIVE PERCENT: 0.9 % (ref 0–3)
FERRITIN: 75 NG/ML (ref 30–400)
FOLATE SERPL-MCNC: 3.5 NG/ML (ref 3.1–17.5)
GFR, ESTIMATED: >90 ML/MIN/1.73M2
GLUCOSE SERPL-MCNC: 93 MG/DL (ref 70–99)
HCT VFR BLD CALC: 36.4 % (ref 42–52)
HEMOGLOBIN: 12.2 GM/DL (ref 13.5–18)
IRON: 80 UG/DL (ref 59–158)
LACTATE DEHYDROGENASE: 178 IU/L (ref 120–246)
LYMPHOCYTES ABSOLUTE: 1.3 K/CU MM
LYMPHOCYTES RELATIVE PERCENT: 21.8 % (ref 24–44)
MCH RBC QN AUTO: 31.5 PG (ref 27–31)
MCHC RBC AUTO-ENTMCNC: 33.5 % (ref 32–36)
MCV RBC AUTO: 94.1 FL (ref 78–100)
MONOCYTES ABSOLUTE: 0.4 K/CU MM
MONOCYTES RELATIVE PERCENT: 6.1 % (ref 0–4)
NEUTROPHILS ABSOLUTE: 4.2 K/CU MM
NEUTROPHILS RELATIVE PERCENT: 71 % (ref 36–66)
PCT TRANSFERRIN: 34 % (ref 10–44)
PDW BLD-RTO: 14.9 % (ref 11.7–14.9)
PLATELET # BLD: 362 K/CU MM (ref 140–440)
PMV BLD AUTO: 8.7 FL (ref 7.5–11.1)
POTASSIUM SERPL-SCNC: 4.3 MMOL/L (ref 3.5–5.1)
RBC # BLD: 3.87 M/CU MM (ref 4.6–6.2)
SODIUM BLD-SCNC: 140 MMOL/L (ref 135–145)
TOTAL IRON BINDING CAPACITY: 236 UG/DL (ref 250–450)
TOTAL PROTEIN: 6.7 GM/DL (ref 6.4–8.2)
UNSATURATED IRON BINDING CAPACITY: 156 UG/DL (ref 110–370)
VITAMIN B-12: 421.4 PG/ML (ref 211–911)
WBC # BLD: 5.9 K/CU MM (ref 4–10.5)

## 2024-07-16 PROCEDURE — 80053 COMPREHEN METABOLIC PANEL: CPT

## 2024-07-16 PROCEDURE — 99214 OFFICE O/P EST MOD 30 MIN: CPT | Performed by: INTERNAL MEDICINE

## 2024-07-16 PROCEDURE — 82607 VITAMIN B-12: CPT

## 2024-07-16 PROCEDURE — 83550 IRON BINDING TEST: CPT

## 2024-07-16 PROCEDURE — 83540 ASSAY OF IRON: CPT

## 2024-07-16 PROCEDURE — 85025 COMPLETE CBC W/AUTO DIFF WBC: CPT

## 2024-07-16 PROCEDURE — 82728 ASSAY OF FERRITIN: CPT

## 2024-07-16 PROCEDURE — 83615 LACTATE (LD) (LDH) ENZYME: CPT

## 2024-07-16 PROCEDURE — 82746 ASSAY OF FOLIC ACID SERUM: CPT

## 2024-07-17 ENCOUNTER — OFFICE VISIT (OUTPATIENT)
Dept: ONCOLOGY | Age: 34
End: 2024-07-17
Payer: MEDICARE

## 2024-07-17 ENCOUNTER — HOSPITAL ENCOUNTER (OUTPATIENT)
Dept: INFUSION THERAPY | Age: 34
Discharge: HOME | End: 2024-07-17
Payer: MEDICARE

## 2024-07-17 VITALS
DIASTOLIC BLOOD PRESSURE: 83 MMHG | TEMPERATURE: 98.3 F | BODY MASS INDEX: 22.81 KG/M2 | WEIGHT: 133.6 LBS | HEIGHT: 64 IN | OXYGEN SATURATION: 99 % | SYSTOLIC BLOOD PRESSURE: 128 MMHG | HEART RATE: 69 BPM

## 2024-07-17 DIAGNOSIS — K50.919 CROHN'S DISEASE WITH COMPLICATION, UNSPECIFIED GASTROINTESTINAL TRACT LOCATION (HCC): ICD-10-CM

## 2024-07-17 DIAGNOSIS — L93.0 DISCOID LUPUS: ICD-10-CM

## 2024-07-17 DIAGNOSIS — D64.9 ANEMIA, UNSPECIFIED TYPE: Primary | ICD-10-CM

## 2024-07-17 DIAGNOSIS — M45.0 ANKYLOSING SPONDYLITIS OF MULTIPLE SITES IN SPINE (HCC): ICD-10-CM

## 2024-07-17 PROCEDURE — G8420 CALC BMI NORM PARAMETERS: HCPCS | Performed by: INTERNAL MEDICINE

## 2024-07-17 PROCEDURE — 99211 OFF/OP EST MAY X REQ PHY/QHP: CPT

## 2024-07-17 PROCEDURE — 99213 OFFICE O/P EST LOW 20 MIN: CPT | Performed by: INTERNAL MEDICINE

## 2024-07-17 PROCEDURE — G8427 DOCREV CUR MEDS BY ELIG CLIN: HCPCS | Performed by: INTERNAL MEDICINE

## 2024-07-17 PROCEDURE — 4004F PT TOBACCO SCREEN RCVD TLK: CPT | Performed by: INTERNAL MEDICINE

## 2024-07-17 RX ORDER — UPADACITINIB 15 MG/1
TABLET, EXTENDED RELEASE ORAL
COMMUNITY
Start: 2024-05-01

## 2024-07-17 NOTE — PROGRESS NOTES
Patient Name:  Blaise Mcfadden  Patient :  1990  Patient MRN:  [id number]     Primary Oncologist: Brittani Zabala MD  Referring Provider: Devin Hansen MD     Date of Service: 2024      Reason for Consult:  Chronic Anemia      Chief Complaint:    Chief Complaint   Patient presents with    Follow-up    Results       Encounter Diagnoses   Name Primary?    Anemia, unspecified type Yes    Crohn's disease with complication, unspecified gastrointestinal tract location (HCC)     Discoid lupus     Ankylosing spondylitis of multiple sites in spine (HCC)           HPI: 23: He arrived alone to the clinic today.  Reported that he has been using Humira for Crohn's disease.  Just completed a course of steroids as well.  Also reported chronic back pain.  No bleeding.  No fever.  Weight has been stable lately.  Currently denies any chest pain, abdominal pain.  Urinary symptoms.    20 CT Chest  IMPRESSION:  1. No acute abnormality.    2021 CBC WBC 5.5 hemoglobin 12.8 hematocrit 38.2 MCV 90.8 platelets 231 ANC 2900  ALT 8  AST 13  Creatinine 1.1  CRP 57.8  Vitamin D 25: 13.1    10/28/2021 anti-DNA antibody less than 1  C4 25.3  C3 131.8  Sed rate 29  CRP 36.2  CMP WNL  CBC WBC 3.9 hemoglobin 12.2 hematocrit 37.4 MCV 90.6 platelets 244 ANC 1400  Thiopurine Methyltransferase 26.7  Urinalysis Cloudy, otherwise WNL    10/29/21 MRI Pelvis  IMPRESSION:     1.  BILATERAL SACROILIITIS WHICH APPEARS CHRONIC.  A SERONEGATIVE   SPONDYLOARTHROPATHY SUCH AS ANKYLOSING SPONDYLITIS SHOULD BE CONSIDERED.   MRI Lumbar Spine  IMPRESSION:     1.  There is mild edema along the anterior superior endplates of L4 and   L5 vertebra.  This finding could represent early osteitis/Romanus lesions   with ankylosing spondylitis.   2.  At L4-L5 there is a disc herniation that causes mild narrowing of the   spinal canal.  Moderate bilateral neural foraminal narrowing is also seen   at this level.   3.  At T12-L1 there is moderate

## 2024-07-17 NOTE — PROGRESS NOTES
MA Rooming Questions  Patient: Blaise Mcfadden  MRN: [id number]    Date: 7/17/2024        1. Do you have any new issues?   no         2. Do you need any refills on medications?    no    3. Have you had any imaging done since your last visit?   yes - labs 7/16    4. Have you been hospitalized or seen in the emergency room since your last visit here?   no    5. Did the patient have a depression screening completed today? No    No data recorded     PHQ-9 Given to (if applicable):               PHQ-9 Score (if applicable):                     [] Positive     []  Negative              Does question #9 need addressed (if applicable)                     [] Yes    []  No               Sharlene Mcfadden CMA

## 2024-09-04 NOTE — ASSESSMENT & PLAN NOTE
- stable, following w/ Dermatology in [place] [id number] same dose MTX Troponin 51.96 (lab called)  PA notified

## 2025-02-25 ENCOUNTER — OFFICE (OUTPATIENT)
Dept: URBAN - METROPOLITAN AREA CLINIC 18 | Age: 35
End: 2025-02-25

## 2025-02-25 VITALS
OXYGEN SATURATION: 98 % | HEIGHT: 65 IN | SYSTOLIC BLOOD PRESSURE: 120 MMHG | HEART RATE: 76 BPM | DIASTOLIC BLOOD PRESSURE: 70 MMHG | WEIGHT: 135 LBS

## 2025-02-25 DIAGNOSIS — K50.111 CROHN'S DISEASE OF LARGE INTESTINE WITH RECTAL BLEEDING: ICD-10-CM

## 2025-02-25 DIAGNOSIS — M45.7 ANKYLOSING SPONDYLITIS OF LUMBOSACRAL REGION: ICD-10-CM

## 2025-02-25 PROCEDURE — 99214 OFFICE O/P EST MOD 30 MIN: CPT | Performed by: INTERNAL MEDICINE

## 2025-03-12 ENCOUNTER — CLINICAL DOCUMENTATION (OUTPATIENT)
Dept: ONCOLOGY | Age: 35
End: 2025-03-12

## 2025-03-12 NOTE — PROGRESS NOTES
Called patient to reschedule their No Show appointment on 3/12/25 with . Spoke with patient and rescheduled appointment to 4/2/25 with . Pt voiced understanding. .

## 2025-04-03 ENCOUNTER — TELEPHONE (OUTPATIENT)
Dept: ONCOLOGY | Age: 35
End: 2025-04-03

## 2025-04-03 NOTE — TELEPHONE ENCOUNTER
Called patient to reschedule their No Show appointment on 04/02/25 with . Spoke with patient and patient will call back and schedule appt .

## 2025-04-11 ENCOUNTER — AMBULATORY SURGICAL CENTER (OUTPATIENT)
Dept: URBAN - METROPOLITAN AREA SURGERY 7 | Facility: SURGERY | Age: 35
End: 2025-04-11
Payer: MEDICARE

## 2025-04-11 VITALS
SYSTOLIC BLOOD PRESSURE: 149 MMHG | HEART RATE: 107 BPM | HEART RATE: 70 BPM | SYSTOLIC BLOOD PRESSURE: 172 MMHG | SYSTOLIC BLOOD PRESSURE: 131 MMHG | SYSTOLIC BLOOD PRESSURE: 165 MMHG | RESPIRATION RATE: 15 BRPM | HEIGHT: 65 IN | SYSTOLIC BLOOD PRESSURE: 189 MMHG | RESPIRATION RATE: 9 BRPM | SYSTOLIC BLOOD PRESSURE: 194 MMHG | DIASTOLIC BLOOD PRESSURE: 108 MMHG | TEMPERATURE: 96.8 F | SYSTOLIC BLOOD PRESSURE: 168 MMHG | SYSTOLIC BLOOD PRESSURE: 184 MMHG | HEART RATE: 74 BPM | HEART RATE: 80 BPM | OXYGEN SATURATION: 96 % | WEIGHT: 132 LBS | DIASTOLIC BLOOD PRESSURE: 108 MMHG | SYSTOLIC BLOOD PRESSURE: 172 MMHG | DIASTOLIC BLOOD PRESSURE: 98 MMHG | HEART RATE: 74 BPM | DIASTOLIC BLOOD PRESSURE: 111 MMHG | HEART RATE: 80 BPM | HEART RATE: 76 BPM | DIASTOLIC BLOOD PRESSURE: 97 MMHG | HEART RATE: 72 BPM | SYSTOLIC BLOOD PRESSURE: 165 MMHG | HEART RATE: 69 BPM | DIASTOLIC BLOOD PRESSURE: 120 MMHG | HEART RATE: 107 BPM | SYSTOLIC BLOOD PRESSURE: 167 MMHG | RESPIRATION RATE: 14 BRPM | SYSTOLIC BLOOD PRESSURE: 168 MMHG | RESPIRATION RATE: 16 BRPM | HEART RATE: 88 BPM | DIASTOLIC BLOOD PRESSURE: 105 MMHG | HEIGHT: 65 IN | OXYGEN SATURATION: 99 % | DIASTOLIC BLOOD PRESSURE: 109 MMHG | RESPIRATION RATE: 9 BRPM | HEART RATE: 100 BPM | HEART RATE: 88 BPM | HEART RATE: 69 BPM | OXYGEN SATURATION: 100 % | OXYGEN SATURATION: 96 % | RESPIRATION RATE: 8 BRPM | SYSTOLIC BLOOD PRESSURE: 139 MMHG | OXYGEN SATURATION: 99 % | TEMPERATURE: 96.8 F | WEIGHT: 132 LBS | SYSTOLIC BLOOD PRESSURE: 131 MMHG | SYSTOLIC BLOOD PRESSURE: 184 MMHG | DIASTOLIC BLOOD PRESSURE: 98 MMHG | SYSTOLIC BLOOD PRESSURE: 189 MMHG | HEART RATE: 76 BPM | SYSTOLIC BLOOD PRESSURE: 149 MMHG | SYSTOLIC BLOOD PRESSURE: 139 MMHG | RESPIRATION RATE: 8 BRPM | DIASTOLIC BLOOD PRESSURE: 84 MMHG | DIASTOLIC BLOOD PRESSURE: 116 MMHG | DIASTOLIC BLOOD PRESSURE: 105 MMHG | DIASTOLIC BLOOD PRESSURE: 111 MMHG | OXYGEN SATURATION: 100 % | SYSTOLIC BLOOD PRESSURE: 194 MMHG | DIASTOLIC BLOOD PRESSURE: 120 MMHG | SYSTOLIC BLOOD PRESSURE: 167 MMHG | HEART RATE: 72 BPM | SYSTOLIC BLOOD PRESSURE: 174 MMHG | RESPIRATION RATE: 16 BRPM | DIASTOLIC BLOOD PRESSURE: 97 MMHG | HEART RATE: 100 BPM | DIASTOLIC BLOOD PRESSURE: 116 MMHG | DIASTOLIC BLOOD PRESSURE: 84 MMHG | SYSTOLIC BLOOD PRESSURE: 174 MMHG | DIASTOLIC BLOOD PRESSURE: 109 MMHG | HEART RATE: 70 BPM | RESPIRATION RATE: 14 BRPM | RESPIRATION RATE: 15 BRPM

## 2025-04-11 DIAGNOSIS — K50.10 CROHN'S DISEASE OF LARGE INTESTINE WITHOUT COMPLICATIONS: ICD-10-CM

## 2025-04-11 PROCEDURE — 45330 DIAGNOSTIC SIGMOIDOSCOPY: CPT | Performed by: INTERNAL MEDICINE

## 2025-05-13 ENCOUNTER — OFFICE (OUTPATIENT)
Dept: URBAN - METROPOLITAN AREA PATHOLOGY 1 | Facility: PATHOLOGY | Age: 35
End: 2025-05-13
Payer: MEDICARE

## 2025-05-13 ENCOUNTER — AMBULATORY SURGICAL CENTER (OUTPATIENT)
Dept: URBAN - METROPOLITAN AREA SURGERY 7 | Facility: SURGERY | Age: 35
End: 2025-05-13
Payer: MEDICARE

## 2025-05-13 VITALS
HEIGHT: 65 IN | DIASTOLIC BLOOD PRESSURE: 90 MMHG | DIASTOLIC BLOOD PRESSURE: 76 MMHG | HEART RATE: 65 BPM | SYSTOLIC BLOOD PRESSURE: 167 MMHG | SYSTOLIC BLOOD PRESSURE: 120 MMHG | HEART RATE: 68 BPM | OXYGEN SATURATION: 100 % | SYSTOLIC BLOOD PRESSURE: 118 MMHG | RESPIRATION RATE: 11 BRPM | RESPIRATION RATE: 9 BRPM | RESPIRATION RATE: 25 BRPM | SYSTOLIC BLOOD PRESSURE: 161 MMHG | RESPIRATION RATE: 20 BRPM | SYSTOLIC BLOOD PRESSURE: 165 MMHG | DIASTOLIC BLOOD PRESSURE: 94 MMHG | HEART RATE: 69 BPM | SYSTOLIC BLOOD PRESSURE: 123 MMHG | SYSTOLIC BLOOD PRESSURE: 115 MMHG | OXYGEN SATURATION: 99 % | HEART RATE: 76 BPM | DIASTOLIC BLOOD PRESSURE: 77 MMHG | RESPIRATION RATE: 16 BRPM | HEART RATE: 66 BPM | HEART RATE: 114 BPM | TEMPERATURE: 97.3 F | HEART RATE: 67 BPM | DIASTOLIC BLOOD PRESSURE: 73 MMHG | DIASTOLIC BLOOD PRESSURE: 80 MMHG | DIASTOLIC BLOOD PRESSURE: 71 MMHG | SYSTOLIC BLOOD PRESSURE: 138 MMHG | DIASTOLIC BLOOD PRESSURE: 95 MMHG | RESPIRATION RATE: 19 BRPM | SYSTOLIC BLOOD PRESSURE: 112 MMHG | DIASTOLIC BLOOD PRESSURE: 105 MMHG | RESPIRATION RATE: 18 BRPM | WEIGHT: 130 LBS

## 2025-05-13 DIAGNOSIS — K63.89 OTHER SPECIFIED DISEASES OF INTESTINE: ICD-10-CM

## 2025-05-13 DIAGNOSIS — D12.0 BENIGN NEOPLASM OF CECUM: ICD-10-CM

## 2025-05-13 DIAGNOSIS — K50.10 CROHN'S DISEASE OF LARGE INTESTINE WITHOUT COMPLICATIONS: ICD-10-CM

## 2025-05-13 PROCEDURE — 45380 COLONOSCOPY AND BIOPSY: CPT | Performed by: INTERNAL MEDICINE

## 2025-05-13 PROCEDURE — 88305 TISSUE EXAM BY PATHOLOGIST: CPT | Performed by: PATHOLOGY

## 2025-05-13 NOTE — SERVICEHPINOTES
Patient has a history of Crohn's colitis since 2011. Last colonoscopy 7 years ago. Surveillance colonoscopy is planned. He is asymptomatic.

## 2025-05-13 NOTE — SERVICENOTES
No definite evidence of active Crohn's disease anywhere on this study.  If polypoid ileocecal valve  proves to be adenomatous it will need to be removed.

## 2025-05-21 LAB — PDF: PDF REPORT: (no result)

## 2025-08-04 ENCOUNTER — OFFICE (OUTPATIENT)
Dept: URBAN - METROPOLITAN AREA PATHOLOGY 1 | Facility: PATHOLOGY | Age: 35
End: 2025-08-04
Payer: MEDICARE

## 2025-08-04 ENCOUNTER — AMBULATORY SURGICAL CENTER (OUTPATIENT)
Dept: URBAN - METROPOLITAN AREA SURGERY 7 | Facility: SURGERY | Age: 35
End: 2025-08-04
Payer: MEDICARE

## 2025-08-04 VITALS
DIASTOLIC BLOOD PRESSURE: 85 MMHG | DIASTOLIC BLOOD PRESSURE: 95 MMHG | RESPIRATION RATE: 22 BRPM | SYSTOLIC BLOOD PRESSURE: 153 MMHG | RESPIRATION RATE: 21 BRPM | HEART RATE: 27 BPM | WEIGHT: 130 LBS | DIASTOLIC BLOOD PRESSURE: 92 MMHG | HEART RATE: 70 BPM | SYSTOLIC BLOOD PRESSURE: 139 MMHG | HEART RATE: 67 BPM | DIASTOLIC BLOOD PRESSURE: 76 MMHG | OXYGEN SATURATION: 100 % | HEART RATE: 49 BPM | DIASTOLIC BLOOD PRESSURE: 84 MMHG | HEART RATE: 39 BPM | RESPIRATION RATE: 26 BRPM | RESPIRATION RATE: 25 BRPM | TEMPERATURE: 99 F | SYSTOLIC BLOOD PRESSURE: 115 MMHG | DIASTOLIC BLOOD PRESSURE: 80 MMHG | HEART RATE: 65 BPM | DIASTOLIC BLOOD PRESSURE: 87 MMHG | SYSTOLIC BLOOD PRESSURE: 133 MMHG | RESPIRATION RATE: 10 BRPM | SYSTOLIC BLOOD PRESSURE: 120 MMHG | HEART RATE: 63 BPM | RESPIRATION RATE: 16 BRPM | OXYGEN SATURATION: 99 % | HEART RATE: 48 BPM | SYSTOLIC BLOOD PRESSURE: 125 MMHG | HEART RATE: 62 BPM | SYSTOLIC BLOOD PRESSURE: 154 MMHG | DIASTOLIC BLOOD PRESSURE: 90 MMHG | SYSTOLIC BLOOD PRESSURE: 108 MMHG | RESPIRATION RATE: 20 BRPM | DIASTOLIC BLOOD PRESSURE: 96 MMHG | HEART RATE: 52 BPM | DIASTOLIC BLOOD PRESSURE: 74 MMHG | OXYGEN SATURATION: 97 % | RESPIRATION RATE: 14 BRPM | SYSTOLIC BLOOD PRESSURE: 121 MMHG | RESPIRATION RATE: 12 BRPM | SYSTOLIC BLOOD PRESSURE: 144 MMHG | HEIGHT: 65 IN | SYSTOLIC BLOOD PRESSURE: 149 MMHG | DIASTOLIC BLOOD PRESSURE: 81 MMHG

## 2025-08-04 DIAGNOSIS — Z09 ENCOUNTER FOR FOLLOW-UP EXAMINATION AFTER COMPLETED TREATMEN: ICD-10-CM

## 2025-08-04 DIAGNOSIS — D12.0 BENIGN NEOPLASM OF CECUM: ICD-10-CM

## 2025-08-04 DIAGNOSIS — Z86.0101 PERSONAL HISTORY OF ADENOMATOUS AND SERRATED COLON POLYPS: ICD-10-CM

## 2025-08-04 PROBLEM — K63.5 POLYP OF COLON: Status: ACTIVE | Noted: 2025-08-04

## 2025-08-04 PROCEDURE — 88305 TISSUE EXAM BY PATHOLOGIST: CPT | Performed by: PATHOLOGY

## 2025-08-04 PROCEDURE — 45385 COLONOSCOPY W/LESION REMOVAL: CPT | Mod: PT | Performed by: INTERNAL MEDICINE

## 2025-08-07 LAB — PDF: PDF REPORT: (no result)

## 2025-08-22 ENCOUNTER — OFFICE VISIT (OUTPATIENT)
Dept: ONCOLOGY | Age: 35
End: 2025-08-22
Payer: MEDICARE

## 2025-08-22 ENCOUNTER — HOSPITAL ENCOUNTER (OUTPATIENT)
Dept: INFUSION THERAPY | Age: 35
Discharge: HOME | End: 2025-08-22
Payer: MEDICARE

## 2025-08-22 VITALS
HEART RATE: 83 BPM | HEIGHT: 64 IN | DIASTOLIC BLOOD PRESSURE: 94 MMHG | BODY MASS INDEX: 21.92 KG/M2 | TEMPERATURE: 98.2 F | WEIGHT: 128.4 LBS | OXYGEN SATURATION: 97 % | SYSTOLIC BLOOD PRESSURE: 135 MMHG

## 2025-08-22 DIAGNOSIS — E53.8 B12 DEFICIENCY: Primary | ICD-10-CM

## 2025-08-22 DIAGNOSIS — K50.919 CROHN'S DISEASE WITH COMPLICATION, UNSPECIFIED GASTROINTESTINAL TRACT LOCATION (HCC): ICD-10-CM

## 2025-08-22 DIAGNOSIS — L93.0 DISCOID LUPUS: ICD-10-CM

## 2025-08-22 DIAGNOSIS — D64.9 ANEMIA, UNSPECIFIED TYPE: Primary | ICD-10-CM

## 2025-08-22 DIAGNOSIS — M45.0 ANKYLOSING SPONDYLITIS OF MULTIPLE SITES IN SPINE (HCC): ICD-10-CM

## 2025-08-22 PROCEDURE — 99214 OFFICE O/P EST MOD 30 MIN: CPT | Performed by: INTERNAL MEDICINE

## 2025-08-22 PROCEDURE — 99212 OFFICE O/P EST SF 10 MIN: CPT

## 2025-08-22 RX ORDER — SODIUM CHLORIDE 9 MG/ML
INJECTION, SOLUTION INTRAVENOUS PRN
OUTPATIENT
Start: 2025-08-22

## 2025-08-22 RX ORDER — FOLIC ACID 1 MG/1
1 TABLET ORAL DAILY
Qty: 30 TABLET | Refills: 5 | Status: SHIPPED | OUTPATIENT
Start: 2025-08-22

## 2025-08-22 RX ORDER — CYANOCOBALAMIN 1000 UG/ML
1000 INJECTION, SOLUTION INTRAMUSCULAR; SUBCUTANEOUS ONCE
OUTPATIENT
Start: 2025-08-22 | End: 2025-08-22

## 2025-08-22 RX ORDER — FERROUS GLUCONATE 324(37.5)
324 TABLET ORAL EVERY OTHER DAY
Qty: 15 TABLET | Refills: 5 | Status: SHIPPED | OUTPATIENT
Start: 2025-08-22 | End: 2025-09-21

## 2025-08-22 RX ORDER — HYDROCORTISONE SODIUM SUCCINATE 100 MG/2ML
100 INJECTION INTRAMUSCULAR; INTRAVENOUS
OUTPATIENT
Start: 2025-08-22

## 2025-08-22 RX ORDER — ACETAMINOPHEN 325 MG/1
650 TABLET ORAL
OUTPATIENT
Start: 2025-08-22

## 2025-08-22 RX ORDER — DIPHENHYDRAMINE HYDROCHLORIDE 50 MG/ML
50 INJECTION, SOLUTION INTRAMUSCULAR; INTRAVENOUS
OUTPATIENT
Start: 2025-08-22

## 2025-08-22 RX ORDER — EPINEPHRINE 1 MG/ML
0.3 INJECTION, SOLUTION, CONCENTRATE INTRAVENOUS PRN
OUTPATIENT
Start: 2025-08-22

## 2025-08-22 RX ORDER — FAMOTIDINE 10 MG/ML
20 INJECTION, SOLUTION INTRAVENOUS
OUTPATIENT
Start: 2025-08-22

## 2025-08-22 RX ORDER — ALBUTEROL SULFATE 90 UG/1
4 INHALANT RESPIRATORY (INHALATION) PRN
OUTPATIENT
Start: 2025-08-22

## 2025-08-22 RX ORDER — ONDANSETRON 2 MG/ML
8 INJECTION INTRAMUSCULAR; INTRAVENOUS
OUTPATIENT
Start: 2025-08-22

## 2025-08-22 ASSESSMENT — PATIENT HEALTH QUESTIONNAIRE - PHQ9
2. FEELING DOWN, DEPRESSED OR HOPELESS: NOT AT ALL
1. LITTLE INTEREST OR PLEASURE IN DOING THINGS: NOT AT ALL
SUM OF ALL RESPONSES TO PHQ QUESTIONS 1-9: 0